# Patient Record
Sex: FEMALE | Race: WHITE | NOT HISPANIC OR LATINO | Employment: OTHER | URBAN - METROPOLITAN AREA
[De-identification: names, ages, dates, MRNs, and addresses within clinical notes are randomized per-mention and may not be internally consistent; named-entity substitution may affect disease eponyms.]

---

## 2017-05-15 ENCOUNTER — OFFICE VISIT (OUTPATIENT)
Dept: HEMATOLOGY/ONCOLOGY | Facility: CLINIC | Age: 52
End: 2017-05-15
Payer: MEDICARE

## 2017-05-15 VITALS
RESPIRATION RATE: 18 BRPM | HEART RATE: 66 BPM | WEIGHT: 127.69 LBS | TEMPERATURE: 98 F | DIASTOLIC BLOOD PRESSURE: 79 MMHG | SYSTOLIC BLOOD PRESSURE: 129 MMHG

## 2017-05-15 DIAGNOSIS — J43.1 PANLOBULAR EMPHYSEMA: ICD-10-CM

## 2017-05-15 DIAGNOSIS — Z87.891 PERSONAL HISTORY OF TOBACCO USE, PRESENTING HAZARDS TO HEALTH: ICD-10-CM

## 2017-05-15 DIAGNOSIS — C50.211 MALIGNANT NEOPLASM OF UPPER-INNER QUADRANT OF RIGHT FEMALE BREAST: ICD-10-CM

## 2017-05-15 DIAGNOSIS — Z17.1 ESTROGEN RECEPTOR NEGATIVE STATUS (ER-): ICD-10-CM

## 2017-05-15 PROBLEM — C50.911 MALIGNANT NEOPLASM OF RIGHT BREAST: Status: ACTIVE | Noted: 2017-05-15

## 2017-05-15 PROCEDURE — 99213 OFFICE O/P EST LOW 20 MIN: CPT | Mod: ,,, | Performed by: INTERNAL MEDICINE

## 2017-05-15 RX ORDER — GABAPENTIN 300 MG/1
300 CAPSULE ORAL 3 TIMES DAILY
COMMUNITY
End: 2018-05-07 | Stop reason: SDUPTHER

## 2017-05-15 RX ORDER — FENTANYL 25 UG/1
PATCH TRANSDERMAL
Refills: 0 | COMMUNITY
Start: 2017-04-17 | End: 2018-10-02

## 2017-05-15 RX ORDER — ZOLPIDEM TARTRATE 10 MG/1
5 TABLET ORAL NIGHTLY PRN
COMMUNITY
End: 2018-10-15 | Stop reason: SDUPTHER

## 2017-05-15 RX ORDER — ASPIRIN 325 MG
325 TABLET ORAL DAILY
COMMUNITY

## 2017-05-15 RX ORDER — HYDROCODONE BITARTRATE AND ACETAMINOPHEN 10; 325 MG/1; MG/1
1 TABLET ORAL EVERY 4 HOURS PRN
COMMUNITY
End: 2018-05-07 | Stop reason: SDUPTHER

## 2017-05-15 RX ORDER — ISOSORBIDE MONONITRATE 60 MG/1
TABLET, EXTENDED RELEASE ORAL
Refills: 11 | COMMUNITY
Start: 2017-04-17 | End: 2019-03-11 | Stop reason: SDUPTHER

## 2017-05-15 RX ORDER — EZETIMIBE 10 MG/1
10 TABLET ORAL DAILY
COMMUNITY
End: 2019-03-11 | Stop reason: SDUPTHER

## 2017-05-15 RX ORDER — ENALAPRIL MALEATE 5 MG/1
5 TABLET ORAL DAILY
COMMUNITY
End: 2019-03-11 | Stop reason: SDUPTHER

## 2017-05-15 NOTE — MR AVS SNAPSHOT
[facility] Hematology Oncology  [address]  [address]  [place] [address]  Phone: [phone]  Fax: [phone]                  Clara Murillo   5/15/2017 2:45 PM   Office Visit    Description:  Female : 1965   Provider:  Israel Neely MD   Department:  [facility] Oncology           Reason for Visit     Cancer     Melanoma     Skin Cancer           Diagnoses this Visit        Comments    Malignant neoplasm of upper-inner quadrant of right female breast         Estrogen receptor negative status (ER-)         Panlobular emphysema         Personal history of tobacco use, presenting hazards to health                To Do List           Future Appointments        Provider Department Dept Phone    2017 2:00 PM Fernando Ashraf MD [facility] Rheumatology [phone]    2017 2:45 PM Israel Neely MD [facility] Hematology Oncology [phone]      Goals (5 Years of Data)     None      Follow-Up and Disposition     Return in about 6 months (around 11/15/2017).    Follow-up and Disposition History           Medications                Verify that the below list of medications is an accurate representation of the medications you are currently taking.  If none reported, the list may be blank. If incorrect, please contact your healthcare provider. Carry this list with you in case of emergency.           Current Medications     ascorbic acid, vitamin C, (VITAMIN C) 100 MG tablet Take 100 mg by mouth once daily.    aspirin 325 MG tablet Take 325 mg by mouth once daily.    enalapril (VASOTEC) 5 MG tablet Take 5 mg by mouth once daily.    ezetimibe (ZETIA) 10 mg tablet Take 10 mg by mouth once daily.    fentaNYL (DURAGESIC) 25 mcg/hr APPLY ONE PATCH TOPICALLY EVERY 3 DAYS THANK YOU    gabapentin (NEURONTIN) 300 MG capsule Take 300 mg by mouth 3 (three) times daily.    hydrocodone-acetaminophen 10-325mg (NORCO)  mg Tab Take 1 tablet  by mouth every 4 (four) hours as needed for Pain.    isosorbide mononitrate (IMDUR) 60 MG 24 hr tablet TAKE ONE TABLET BY MOUTH EVERY DAY THANK YOU    omega-3 fatty acids (FISH OIL) 300 mg Cap Take by mouth.    zolpidem (AMBIEN) 10 mg Tab Take 5 mg by mouth nightly as needed.           Clinical Reference Information           Your Vitals Were     BP Pulse Temp Resp Weight       129/79 66 97.9 °F (36.6 °C) 18 57.9 kg (127 lb 11.2 oz)       Blood Pressure          Most Recent Value    BP  129/79      Allergies as of 5/15/2017     Pcn [Penicillins]      Immunizations Administered on Date of Encounter - 5/15/2017     None      Orders Placed During Today's Visit      Normal Orders This Visit    X-Ray Chest PA And Lateral     Recurring Lab Work Interval Expires    CBC auto differential  Once a week until 7/14/2018 7/14/2018    Comprehensive metabolic panel   7/14/2018

## 2017-05-15 NOTE — LETTER
May 15, 2017      Elina Acosta MD  [address]  [place] [address]           [facility] Hematology Oncology  [address]  [address]  [place] [address]  Phone: [phone]  Fax: [phone]          Patient: Clara Murillo   MR Number: [id number]   YOB: 1965   Date of Visit: 5/15/2017       Dear Dr. Elina Acosta:    Thank you for referring Clara Murillo to me for evaluation. Attached you will find relevant portions of my assessment and plan of care.    If you have questions, please do not hesitate to call me. I look forward to following Clara Murillo along with you.    Sincerely,    Israel Neely MD    Enclosure  CC:  No Recipients    If you would like to receive this communication electronically, please contact [email or web address] or [phone] to request more information on [employer] Link access.    For providers and/or their staff who would like to refer a patient to [facility], please contact us through our one-stop-shop provider referral line, [facility], at [phone].    If you feel you have received this communication in error or would no longer like to receive these types of communications, please e-mail [email or web address]

## 2017-05-15 NOTE — PROGRESS NOTES
[facility] Hematology/Oncology            PROGRESS NOTE      Subjective:       Patient ID:   NAME: Clara Murillo : 1965     52 y.o. female    Referring Doc: Elina Acosta MD  Other Physicians: Juan Diego Whitlock (PCP-[place])    Chief Complaint:  Cancer; Melanoma; and Skin Cancer      History of Present Illness:     Patient returns today for a regularly scheduled follow-up visit.  The patient was last seen in May 2016; she is doing ok with new issues; she denies any CP, SOB, HA's or N/V; she reports having new pink mole on left anterior auricular area, and dry patch on right lateral to nares on cheek            ROS:   GEN: normal without any fever, night sweats or weight loss  HEENT: normal with no HA's, sore throat, stiff neck, changes in vision  CV: normal with no CP, SOB, PND, LYNCH or orthopnea  PULM: normal with no SOB, cough, hemoptysis, sputum or pleuritic pain  GI: normal with no abdominal pain, nausea, vomiting, constipation, diarrhea, melanotic stools, BRBPR, or hematemesis  : normal with no hematuria, dysuria  BREAST: normal with no mass, discharge, pain  SKIN: new pink mole on left anterior auricular area, and dry patch on right lateral to nares on cheeck      Allergies:  Review of patient's allergies indicates:   Allergen Reactions    Pcn [penicillins] Hives       Medications:    Current Outpatient Prescriptions:     ascorbic acid, vitamin C, (VITAMIN C) 100 MG tablet, Take 100 mg by mouth once daily., Disp: , Rfl:     aspirin 325 MG tablet, Take 325 mg by mouth once daily., Disp: , Rfl:     enalapril (VASOTEC) 5 MG tablet, Take 5 mg by mouth once daily., Disp: , Rfl:     ezetimibe (ZETIA) 10 mg tablet, Take 10 mg by mouth once daily., Disp: , Rfl:     fentaNYL (DURAGESIC) 25 mcg/hr, APPLY ONE PATCH TOPICALLY EVERY 3 DAYS THANK YOU, Disp: , Rfl: 0    gabapentin (NEURONTIN) 300 MG capsule, Take 300 mg by mouth 3 (three) times daily., Disp: , Rfl:     hydrocodone-acetaminophen  10-325mg (NORCO)  mg Tab, Take 1 tablet by mouth every 4 (four) hours as needed for Pain., Disp: , Rfl:     isosorbide mononitrate (IMDUR) 60 MG 24 hr tablet, TAKE ONE TABLET BY MOUTH EVERY DAY THANK YOU, Disp: , Rfl: 11    omega-3 fatty acids (FISH OIL) 300 mg Cap, Take by mouth., Disp: , Rfl:     zolpidem (AMBIEN) 10 mg Tab, Take 5 mg by mouth nightly as needed., Disp: , Rfl:     PMHx/PSHx Updates:  See patient's last visit with me on Visit date not found.        Pathology:  No matching staging information was found for the patient.          Objective:     Vitals:  Blood pressure 129/79, pulse 66, temperature 97.9 °F (36.6 °C), resp. rate 18, weight 57.9 kg (127 lb 11.2 oz).    Physical Examination:   GEN: no apparent distress, comfortable; AAOx3  HEAD: atraumatic and normocephalic  EYES: no pallor, no icterus, PERRLA  ENT: OMM, no pharyngeal erythema, external ears WNL; no nasal discharge; no thrush  NECK: no masses, thyroid normal, trachea midline, no LAD/LN's, supple  CV: RRR with no murmur; normal pulse; normal S1 and S2; no pedal edema  CHEST: Normal respiratory effort; CTAB; normal breath sounds; no wheeze or crackles  ABDOM: nontender and nondistended; soft; normal bowel sounds; no rebound/guarding  MUSC/Skeletal: ROM normal; no crepitus; joints normal; no deformities or arthropathy  EXTREM: no clubbing, cyanosis, inflammation or swelling  SKIN: new pink mole on left anterior auricular area, and dry patch on right lateral to nares on cheeck  : no keen  NEURO: grossly intact; motor/sensory WNL; AAOx3; no tremors  PSYCH: normal mood, affect and behavior  LYMPH: normal cervical, supraclavicular, axillary and groin LN's            Labs:   none    I have reviewed all available lab results and radiology reports.    Radiology/Diagnostic Studies:        Assessment/Plan:   (1) 52 y.o. female with diagnosis of right breast cancer  - diagnosed in 2004  - Stage II  - s/p bilateral mastectomies  - followed  by Dr Acosta with Gen Surgery  - s/p AC x4 and XRT  - triple negative  - CT scans in 3/2016    (2) hx/of TIA's and PAD  - followed by Dr Whitlock with cardiology    (3) COPD and former smoker  - followed by Dr Wolff with pulm    (4) Right inferior auricular biopsy in Nov 2013 - atypical single unit junctional melanocytic hyperplasia with no evidence of malignancy  - Dr Acosta following  - new dry spots on right cheek and left anterior auricuar    (5) arthritic changes     PLAN:  1. Check up to date labs  2. Refer to Dermatology  3. Check CXR  4. F/u with Dr Acosta  Fax note to Juan Diego Lee and Kamlesh  RTC 6 months            Discussion:     I have explained all of the above in detail and the patient understands all of the current recommendation(s). I have answered all of their questions to the best of my ability and to their complete satisfaction.   The patient is to continue with the current management plan.    RTC in  6 months keep; 1 month can cancel          Electronically signed by Israel Neely MD

## 2017-05-24 ENCOUNTER — TELEPHONE (OUTPATIENT)
Dept: HEMATOLOGY/ONCOLOGY | Facility: CLINIC | Age: 52
End: 2017-05-24

## 2017-05-24 NOTE — TELEPHONE ENCOUNTER
Pt called in requesting deborah and dr reyes to send a referral to dr. Denise ellis dermatologist     Fax # [phone]

## 2017-11-20 ENCOUNTER — OFFICE VISIT (OUTPATIENT)
Dept: HEMATOLOGY/ONCOLOGY | Facility: CLINIC | Age: 52
End: 2017-11-20
Payer: MEDICARE

## 2017-11-20 VITALS
TEMPERATURE: 98 F | HEIGHT: 64 IN | RESPIRATION RATE: 18 BRPM | HEART RATE: 65 BPM | DIASTOLIC BLOOD PRESSURE: 84 MMHG | SYSTOLIC BLOOD PRESSURE: 130 MMHG | BODY MASS INDEX: 21.92 KG/M2 | WEIGHT: 128.38 LBS

## 2017-11-20 DIAGNOSIS — Z17.1 ESTROGEN RECEPTOR NEGATIVE TUMOR STATUS: ICD-10-CM

## 2017-11-20 DIAGNOSIS — Z87.891 PERSONAL HISTORY OF TOBACCO USE, PRESENTING HAZARDS TO HEALTH: ICD-10-CM

## 2017-11-20 DIAGNOSIS — Z17.1 MALIGNANT NEOPLASM OF UPPER-INNER QUADRANT OF RIGHT BREAST IN FEMALE, ESTROGEN RECEPTOR NEGATIVE: Primary | ICD-10-CM

## 2017-11-20 DIAGNOSIS — C50.211 MALIGNANT NEOPLASM OF UPPER-INNER QUADRANT OF RIGHT BREAST IN FEMALE, ESTROGEN RECEPTOR NEGATIVE: Primary | ICD-10-CM

## 2017-11-20 PROCEDURE — 99213 OFFICE O/P EST LOW 20 MIN: CPT | Mod: ,,, | Performed by: INTERNAL MEDICINE

## 2017-11-20 NOTE — LETTER
November 20, 2017      Chai Adamson MD  [address] [place] [address]           [facility] Hematology Oncology  [facility]  [address]  [facility] [address]  Phone: [phone]  Fax: [phone]          Patient: Clara Dotson   MR Number: [id number]   YOB: 1965   Date of Visit: 11/20/2017       Dear Dr. Chai Adamson:    Thank you for referring Clara Dotson to me for evaluation. Attached you will find relevant portions of my assessment and plan of care.    If you have questions, please do not hesitate to call me. I look forward to following Clara Dotson along with you.    Sincerely,    Israel Neely MD    Enclosure  CC:  No Recipients    If you would like to receive this communication electronically, please contact [email or web address] or [phone] to request more information on [employer] Link access.    For providers and/or their staff who would like to refer a patient to [facility], please contact us through our one-stop-shop provider referral line, [facility], at [phone].    If you feel you have received this communication in error or would no longer like to receive these types of communications, please e-mail [email or web address]

## 2017-11-20 NOTE — PROGRESS NOTES
[facility] Hematology/Oncology  PROGRESS NOTE      Subjective:       Patient ID:   NAME: Clara Dotson : 1965     52 y.o. female    Referring Doc: Dave  Other Physicians: Juan Diego Whitlock (PCP-[place])    Chief Complaint:  breast ca  f/u    History of Present Illness:     Patient returns today for a regularly scheduled follow-up visit.  The patient is her with a family friend. She is doing ok. She is seeing a heart specialist in [place] (Dr Gaytan). She denies any current CP, SOB,HA's or N/V. Occasional leg cramps.             ROS:   GEN: normal without any fever, night sweats or weight loss  HEENT: normal with no HA's, sore throat, stiff neck, changes in vision  CV: normal with no CP, SOB, PND, LYNCH or orthopnea  PULM: normal with no SOB, cough, hemoptysis, sputum or pleuritic pain  GI: normal with no abdominal pain, nausea, vomiting, constipation, diarrhea, melanotic stools, BRBPR, or hematemesis  : normal with no hematuria, dysuria  BREAST: normal with no mass, discharge, pain  SKIN: normal with no rash, erythema, bruising, or swelling    Allergies:  Review of patient's allergies indicates:   Allergen Reactions    Pcn [penicillins] Hives       Medications:    Current Outpatient Prescriptions:     ascorbic acid, vitamin C, (VITAMIN C) 100 MG tablet, Take 100 mg by mouth once daily., Disp: , Rfl:     aspirin 325 MG tablet, Take 325 mg by mouth once daily., Disp: , Rfl:     enalapril (VASOTEC) 5 MG tablet, Take 5 mg by mouth once daily., Disp: , Rfl:     ezetimibe (ZETIA) 10 mg tablet, Take 10 mg by mouth once daily., Disp: , Rfl:     fentaNYL (DURAGESIC) 25 mcg/hr, APPLY ONE PATCH TOPICALLY EVERY 3 DAYS THANK YOU, Disp: , Rfl: 0    gabapentin (NEURONTIN) 300 MG capsule, Take 300 mg by mouth 3 (three) times daily., Disp: , Rfl:     hydrocodone-acetaminophen 10-325mg (NORCO)  mg Tab, Take 1 tablet by mouth every 4 (four) hours as needed for Pain., Disp: , Rfl:     isosorbide mononitrate  "(IMDUR) 60 MG 24 hr tablet, TAKE ONE TABLET BY MOUTH EVERY DAY THANK YOU, Disp: , Rfl: 11    zolpidem (AMBIEN) 10 mg Tab, Take 5 mg by mouth nightly as needed., Disp: , Rfl:     PMHx/PSHx Updates:  See patient's last visit with me on 5/15/2017.  See H&P on         Pathology:  See Vol #1          Objective:     Vitals:  Blood pressure 130/84, pulse 65, temperature 98.4 °F (36.9 °C), resp. rate 18, height 5' 4" (1.626 m), weight 58.2 kg (128 lb 6.4 oz).    Physical Examination:   GEN: no apparent distress, comfortable; AAOx3  HEAD: atraumatic and normocephalic  EYES: no pallor, no icterus, PERRLA  ENT: OMM, no pharyngeal erythema, external ears WNL; no nasal discharge; no thrush  NECK: no masses, thyroid normal, trachea midline, no LAD/LN's, supple  CV: RRR with no murmur; normal pulse; normal S1 and S2; no pedal edema  CHEST: Normal respiratory effort; CTAB; normal breath sounds; no wheeze or crackles  ABDOM: nontender and nondistended; soft; normal bowel sounds; no rebound/guarding  MUSC/Skeletal: ROM normal; no crepitus; joints normal; no deformities or arthropathy  EXTREM: no clubbing, cyanosis, inflammation or swelling  SKIN: no rashes, lesions, ulcers, petechiae or subcutaneous nodules  : no keen  NEURO: grossly intact; motor/sensory WNL; AAOx3; no tremors  PSYCH: normal mood, affect and behavior  LYMPH: normal cervical, supraclavicular, axillary and groin LN's  Breast: no changes; bilateral mastectomies          Labs:   pending    5/31/2017 on chart    Radiology/Diagnostic Studies:    CXR 5/31/2017 negative    I have reviewed all available lab results and radiology reports.    Assessment/Plan:     (1) 52 y.o. female with diagnosis of right breast cancer  - diagnosed in 2004  - Stage II  - s/p bilateral mastectomies  - followed by Dr Acosta with Gen Surgery  - s/p AC x4 and XRT  - triple negative  - CT scans in 3/2016     (2) hx/of TIA's and PAD  - followed by Dr Whitlock with cardiology     (3) COPD and " former smoker  - followed by Dr Wolff with pulm     (4) Right inferior auricular biopsy in Nov 2013 - atypical single unit junctional melanocytic hyperplasia with no evidence of malignancy  - Dr Acosta following  - new dry spots on right cheek and left anterior auricuar     (5) arthritic changes     (6) Skin cancer issues - followed by Dr Manley (Derm)       PLAN:  1. F/u with PCP, card, dermatology and [facility]  2. Check up to date labs  3. RTC in 6 months  Fax note to Chai Adamson MD, Lucila Acosta [place]    Discussion:     I have explained all of the above in detail and the patient understands all of the current recommendation(s). I have answered all of their questions to the best of my ability and to their complete satisfaction.   The patient is to continue with the current management plan.            Electronically signed by Israel Neely MD

## 2018-04-20 RX ORDER — ZOLPIDEM TARTRATE 10 MG/1
TABLET ORAL
Qty: 30 TABLET | OUTPATIENT
Start: 2018-04-20

## 2018-04-20 RX ORDER — GABAPENTIN 300 MG/1
CAPSULE ORAL
Qty: 90 CAPSULE | OUTPATIENT
Start: 2018-04-20

## 2018-04-25 ENCOUNTER — TELEPHONE (OUTPATIENT)
Dept: HEMATOLOGY/ONCOLOGY | Facility: CLINIC | Age: 53
End: 2018-04-25

## 2018-05-07 ENCOUNTER — LAB VISIT (OUTPATIENT)
Dept: LAB | Facility: HOSPITAL | Age: 53
End: 2018-05-07
Attending: INTERNAL MEDICINE
Payer: MEDICARE

## 2018-05-07 ENCOUNTER — TELEPHONE (OUTPATIENT)
Dept: HEMATOLOGY/ONCOLOGY | Facility: CLINIC | Age: 53
End: 2018-05-07

## 2018-05-07 ENCOUNTER — OFFICE VISIT (OUTPATIENT)
Dept: HEMATOLOGY/ONCOLOGY | Facility: CLINIC | Age: 53
End: 2018-05-07
Payer: MEDICARE

## 2018-05-07 VITALS
TEMPERATURE: 98 F | WEIGHT: 133.81 LBS | SYSTOLIC BLOOD PRESSURE: 97 MMHG | BODY MASS INDEX: 22.97 KG/M2 | RESPIRATION RATE: 18 BRPM | HEART RATE: 79 BPM | DIASTOLIC BLOOD PRESSURE: 63 MMHG | OXYGEN SATURATION: 97 %

## 2018-05-07 DIAGNOSIS — J43.1 PANLOBULAR EMPHYSEMA: ICD-10-CM

## 2018-05-07 DIAGNOSIS — G62.9 NEUROPATHY: ICD-10-CM

## 2018-05-07 DIAGNOSIS — D53.9 NUTRITIONAL ANEMIA: ICD-10-CM

## 2018-05-07 DIAGNOSIS — C50.211 MALIGNANT NEOPLASM OF UPPER-INNER QUADRANT OF RIGHT BREAST IN FEMALE, ESTROGEN RECEPTOR NEGATIVE: Primary | ICD-10-CM

## 2018-05-07 DIAGNOSIS — Z87.891 PERSONAL HISTORY OF TOBACCO USE, PRESENTING HAZARDS TO HEALTH: ICD-10-CM

## 2018-05-07 DIAGNOSIS — Z17.1 ESTROGEN RECEPTOR NEGATIVE TUMOR STATUS: ICD-10-CM

## 2018-05-07 DIAGNOSIS — Z17.1 MALIGNANT NEOPLASM OF UPPER-INNER QUADRANT OF RIGHT BREAST IN FEMALE, ESTROGEN RECEPTOR NEGATIVE: Primary | ICD-10-CM

## 2018-05-07 DIAGNOSIS — G89.4 CHRONIC PAIN DISORDER: ICD-10-CM

## 2018-05-07 LAB
ALBUMIN SERPL BCP-MCNC: 3.9 G/DL
ALP SERPL-CCNC: 82 U/L
ALT SERPL W/O P-5'-P-CCNC: 43 U/L
ANION GAP SERPL CALC-SCNC: 8 MMOL/L
AST SERPL-CCNC: 35 U/L
BASOPHILS # BLD AUTO: 0.05 K/UL
BASOPHILS NFR BLD: 1.2 %
BILIRUB SERPL-MCNC: 0.5 MG/DL
BUN SERPL-MCNC: 15 MG/DL
CALCIUM SERPL-MCNC: 9.1 MG/DL
CHLORIDE SERPL-SCNC: 106 MMOL/L
CO2 SERPL-SCNC: 25 MMOL/L
CREAT SERPL-MCNC: 1 MG/DL
DIFFERENTIAL METHOD: ABNORMAL
EOSINOPHIL # BLD AUTO: 0.1 K/UL
EOSINOPHIL NFR BLD: 2.4 %
ERYTHROCYTE [DISTWIDTH] IN BLOOD BY AUTOMATED COUNT: 12.7 %
EST. GFR  (AFRICAN AMERICAN): >60 ML/MIN/1.73 M^2
EST. GFR  (NON AFRICAN AMERICAN): >60 ML/MIN/1.73 M^2
FOLATE SERPL-MCNC: 12.1 NG/ML
GLUCOSE SERPL-MCNC: 128 MG/DL
HCT VFR BLD AUTO: 33.3 %
HGB BLD-MCNC: 11.6 G/DL
IMM GRANULOCYTES # BLD AUTO: 0.01 K/UL
IMM GRANULOCYTES NFR BLD AUTO: 0.2 %
LYMPHOCYTES # BLD AUTO: 1.9 K/UL
LYMPHOCYTES NFR BLD: 43.7 %
MCH RBC QN AUTO: 30.1 PG
MCHC RBC AUTO-ENTMCNC: 34.8 G/DL
MCV RBC AUTO: 87 FL
MONOCYTES # BLD AUTO: 0.3 K/UL
MONOCYTES NFR BLD: 8 %
NEUTROPHILS # BLD AUTO: 1.9 K/UL
NEUTROPHILS NFR BLD: 44.5 %
NRBC BLD-RTO: 0 /100 WBC
PLATELET # BLD AUTO: 236 K/UL
PMV BLD AUTO: 9.9 FL
POTASSIUM SERPL-SCNC: 3.5 MMOL/L
PROT SERPL-MCNC: 6.7 G/DL
RBC # BLD AUTO: 3.85 M/UL
SODIUM SERPL-SCNC: 139 MMOL/L
VIT B12 SERPL-MCNC: 195 PG/ML
WBC # BLD AUTO: 4.23 K/UL

## 2018-05-07 PROCEDURE — 85025 COMPLETE CBC W/AUTO DIFF WBC: CPT

## 2018-05-07 PROCEDURE — 36415 COLL VENOUS BLD VENIPUNCTURE: CPT

## 2018-05-07 PROCEDURE — 99214 OFFICE O/P EST MOD 30 MIN: CPT | Mod: ,,, | Performed by: INTERNAL MEDICINE

## 2018-05-07 PROCEDURE — 80053 COMPREHEN METABOLIC PANEL: CPT

## 2018-05-07 PROCEDURE — 82746 ASSAY OF FOLIC ACID SERUM: CPT

## 2018-05-07 PROCEDURE — 82607 VITAMIN B-12: CPT

## 2018-05-07 RX ORDER — GABAPENTIN 300 MG/1
CAPSULE ORAL
COMMUNITY
End: 2018-10-15 | Stop reason: SDUPTHER

## 2018-05-07 RX ORDER — HYDROCODONE BITARTRATE AND ACETAMINOPHEN 10; 325 MG/1; MG/1
TABLET ORAL EVERY 6 HOURS PRN
COMMUNITY

## 2018-05-07 NOTE — LETTER
May 7, 2018      Chai Adamson MD  [address] [place] [address]           [facility] Hematology Oncology  [facility] [address] [facility]  [address]  [facility] [address]  Phone: [phone]  Fax: [phone]          Patient: Clara Dotson   MR Number: [id number]   YOB: 1965   Date of Visit: 5/7/2018       Dear Dr. Chai Adamson:    Thank you for referring Clara Dotson to me for evaluation. Attached you will find relevant portions of my assessment and plan of care.    If you have questions, please do not hesitate to call me. I look forward to following Clara Dotson along with you.    Sincerely,    Israel Neely MD    Enclosure  CC:  No Recipients    If you would like to receive this communication electronically, please contact [email or web address] or [phone] to request more information on [employer] Link access.    For providers and/or their staff who would like to refer a patient to [facility], please contact us through our one-stop-shop provider referral line, [facility], at [phone].    If you feel you have received this communication in error or would no longer like to receive these types of communications, please e-mail [email or web address]

## 2018-05-07 NOTE — PROGRESS NOTES
[facility] Hematology/Oncology  PROGRESS NOTE      Subjective:       Patient ID:   NAME: Clara Dotson : 1965     53 y.o. female    Referring Doc: Dave  Other Physicians: Lucila (new [facility]), Juan Diego (PCP-[place]); Kalina (Neuro)    Chief Complaint:  breast ca  f/u    History of Present Illness:     Patient returns today for a regularly scheduled follow-up visit.  The patient is her by herself. She is doing ok. She is seeing a heart specialist in [place] (Dr Gaytan). She denies any current CP, SOB,HA's or N/V. She fell about 6 months ago and has been having problems with her knees and legs. She is seeing [facility] Gilman [facility]. She has SOB and LYNCH. She has pain in right knee cap. She saw neuro earlier today and they are planning nerve conduction studies. She plans to see pulmonologist in near future shahid johnson. She had some scan of the back which showed a lot of arthritis. No weight loss; good appetite          ROS:   GEN: normal without any fever, night sweats or weight loss  HEENT: normal with no HA's, sore throat, stiff neck, changes in vision  CV: normal with no CP, SOB, PND, LYNCH or orthopnea  PULM: normal with no SOB, cough, hemoptysis, sputum or pleuritic pain  GI: normal with no abdominal pain, nausea, vomiting, constipation, diarrhea, melanotic stools, BRBPR, or hematemesis  : normal with no hematuria, dysuria  BREAST: normal with no mass, discharge, pain  SKIN: normal with no rash, erythema, bruising, or swelling    Allergies:  Review of patient's allergies indicates:   Allergen Reactions    Pcn [penicillins] Hives       Medications:    Current Outpatient Prescriptions:     ascorbic acid, vitamin C, (VITAMIN C) 100 MG tablet, Take 100 mg by mouth once daily., Disp: , Rfl:     aspirin 325 MG tablet, Take 325 mg by mouth once daily., Disp: , Rfl:     enalapril (VASOTEC) 5 MG tablet, Take 5 mg by mouth once daily., Disp: , Rfl:     ezetimibe (ZETIA) 10 mg tablet, Take 10 mg by mouth  once daily., Disp: , Rfl:     fentaNYL (DURAGESIC) 25 mcg/hr, APPLY ONE PATCH TOPICALLY EVERY 3 DAYS THANK YOU, Disp: , Rfl: 0    gabapentin (NEURONTIN) 300 MG capsule, gabapentin 300 mg capsule, Disp: , Rfl:     hydrocodone-acetaminophen 10-325mg (NORCO)  mg Tab, hydrocodone 10 mg-acetaminophen 325 mg tablet, Disp: , Rfl:     isosorbide mononitrate (IMDUR) 60 MG 24 hr tablet, TAKE ONE TABLET BY MOUTH EVERY DAY THANK YOU, Disp: , Rfl: 11    zolpidem (AMBIEN) 10 mg Tab, Take 5 mg by mouth nightly as needed., Disp: , Rfl:     PMHx/PSHx Updates:  See patient's last visit with me on 11/20/2017.  See H&P on         Pathology:  See Vol #1          Objective:     Vitals:  Blood pressure 97/63, pulse 79, temperature 98.1 °F (36.7 °C), resp. rate 18, weight 60.7 kg (133 lb 12.8 oz), SpO2 97 %.    Physical Examination:   GEN: no apparent distress, comfortable; AAOx3  HEAD: atraumatic and normocephalic  EYES: no pallor, no icterus, PERRLA  ENT: OMM, no pharyngeal erythema, external ears WNL; no nasal discharge; no thrush  NECK: no masses, thyroid normal, trachea midline, no LAD/LN's, supple  CV: RRR with no murmur; normal pulse; normal S1 and S2; no pedal edema  CHEST: Normal respiratory effort; CTAB; normal breath sounds; no wheeze or crackles  ABDOM: nontender and nondistended; soft; normal bowel sounds; no rebound/guarding  MUSC/Skeletal: ROM normal; no crepitus; joints normal; no deformities or arthropathy  EXTREM: no clubbing, cyanosis, inflammation or swelling  SKIN: no rashes, lesions, ulcers, petechiae or subcutaneous nodules; tatoos  : no keen  NEURO: grossly intact; motor/sensory WNL; AAOx3; no tremors  PSYCH: normal mood, affect and behavior  LYMPH: normal cervical, supraclavicular, axillary and groin LN's  Breast: no changes; bilateral mastectomies          Labs:   None available    Radiology/Diagnostic Studies:    CXR 5/31/2017 negative    I have reviewed all available lab results and radiology  reports.    Assessment/Plan:     (1) 53 y.o. female with diagnosis of right breast cancer  - diagnosed in 2004  - Stage II  - s/p bilateral mastectomies  - followed by Dr Acosta with Gen Surgery  - s/p AC x4 and XRT  - triple negative  - CT scans in 3/2016     (2) hx/of TIA's and PAD  - followed by Dr Whitlock with cardiology and now by Dr Gaytan     (3) COPD and former smoker  - followed by Dr Wolff with pulm in past     (4) Right inferior auricular biopsy in Nov 2013 - atypical single unit junctional melanocytic hyperplasia with no evidence of malignancy  - Dr Acosta following  - new dry spots on right cheek and left anterior auricular     (5) Arthritic issues    (6) Skin cancer issues - followed by Dr Manley (Derm)    (7) New lower extremity issues - she is seeing Dr Gilman/René with Neurology at [place] and plans to have nerve conduction study in the future    (8) Chronic pain syndrome - followed by Pain management - Dr Barajas       PLAN:  1. F/u with PCP, card, dermatology, neuro and Gen Surg  2. Check up to date labs  3. Schedule PET scan if possible  3. RTC in 6 weeks  Fax note to Chai Adamson MD, Lucila Acosta [place], [place]    Discussion:     I have explained all of the above in detail and the patient understands all of the current recommendation(s). I have answered all of their questions to the best of my ability and to their complete satisfaction.   The patient is to continue with the current management plan.            Electronically signed by Israel Neely MD

## 2018-05-08 ENCOUNTER — TELEPHONE (OUTPATIENT)
Dept: HEMATOLOGY/ONCOLOGY | Facility: CLINIC | Age: 53
End: 2018-05-08

## 2018-05-08 NOTE — TELEPHONE ENCOUNTER
----- Message from Israel Neely MD sent at 5/8/2018  8:51 AM CDT -----  She needs to start B12 1000mcg SQ weekly x 4 then monthly      Spoke to Clara about getting B-12 injections. She is in agreement and will call Tabitha back to schedule later today or tomorrow.

## 2018-05-24 ENCOUNTER — TELEPHONE (OUTPATIENT)
Dept: HEMATOLOGY/ONCOLOGY | Facility: CLINIC | Age: 53
End: 2018-05-24

## 2018-05-24 NOTE — TELEPHONE ENCOUNTER
----- Message from Annie Purcell sent at 5/23/2018  2:25 PM CDT -----  Patient called in requesting that Dr. Neely order a lung test. She said that Dr. Gay originally ordered it at [facility], however their machine is messed up and they cannot get her in until the end of July. Please advise and contact patient [phone]. Thanks    Nurses notes reviewed and accepted.     CC:  Patient is a 41 year old female who presents self to clinic seen at the recommendation of Dr. Robin Daley MD with complaint of thickened toenails.    HPI: Ms. Perez relates that she has been prescribed oral Lamisil in the past but never seemed to help.  She took oral Lamisil 3 separate times; June 2009, September 2009, June 2016 without improvement noted.  She had a fungal culture of the nail plate by Dr. More several years ago that was negative for fungus.   Left hallux nail was traumatized several years ago, nail fell off and when it grew back in, was thicker, discolored and only grew 1/2 way out to the nail bed.    Pertinent PMH:      Other genital herpes                                          Multiple sclerosis (CMS/[facility])                    5/16/2014     Chronic rhinitis                                3/6/2014      Lateral epicondylitis  of elbow                 3/6/2014      Sciatic pain                                    10/29/2012    Back pain                                       10/29/2012    Herpes genitalis                                2/3/2012      Dizziness                                       10/27/2011    Generalized anxiety disorder                    6/29/2010     ABN INVOLUN MOVEMENT NEC                        5/11/2010     Depressive disorder, not elsewhere classified   7/29/2009     Dermatophytosis of nail                         6/10/2009     Unspecified constipation                        9/26/2006     PERS HX OF TOBACCO USE                          7/1/2004      Disorder of eye, unspecified                    2/27/2003     Urticaria, unspecified                          2/27/2003     Pertinent PSH:      XRAY HYSTEROSALPINGOGRAM                        9/24/13         Comment: Normal fill and spill    Family and Social Hx:    Review of patient's family status indicates:    Mother                         Alive                     Father                          Alive                       Comment: Unknown    Other                                                    Paternal Aunt                                            Paternal Aunt                                             Social History   Substance Use Topics   • Smoking status: Former Smoker     Packs/day: 0.50     Years: 10.00     Types: Cigarettes     Quit date: 5/10/2009   • Smokeless tobacco: Never Used   • Alcohol use No      Comment: very rare     Current medications reviewed with patient, please see list on chart.  Patient is not on anticoagulant and diabetic medications.    Exam:  General:  Patient presents to the clinic alert, pleasant, cooperative, in no acute distress.    Vascular:  Peripheral pedal pulses palpable, graded at 2/4 for DP and 2/4 for PT to bilateral LE, mild edema noted to bilateral ankles, skin temperature is warm to warm, proximal ankle to distal digits bilateral, no open ulcers on either leg, no cyanosis noted  and no clubbing noted.   Hair growth is present on the dorsal foot bilaterally.      Dermatologic:  Skin texture to the feet is within normal limits to bilateral LE.  No ecchymosis or erythema to either foot or ankle.  Skin is clean, dry and intact to bilateral foot.      Fungal appearing thickened nails 1,2,3 left and 2 right foot.     Musculoskeletal:  Muscle strength is graded at 5/5 Bilateral LE, dorsiflexion, plantar flexion, inversion and eversion.      Impression:  Onychomycosis versus onychodystrophy    Plan:    I discussed with the patient the etiology and treatment options for onychomycosis including both topical and oral antifungal agents; side effect potentials, success rates and fees.   Specimen collected for fungal culture today of all toenails.  Will contact her with results when available.    Copy to Dr. Daley.  Thank you for the opportunity to participate in this pleasant patient's cares.

## 2018-05-25 ENCOUNTER — TELEPHONE (OUTPATIENT)
Dept: HEMATOLOGY/ONCOLOGY | Facility: CLINIC | Age: 53
End: 2018-05-25

## 2018-05-25 NOTE — TELEPHONE ENCOUNTER
----- Message from Marcella Carranza sent at 5/24/2018  2:00 PM CDT -----  Contact: Clara  Nurse please return call. Patient requesting Dr Neely to place order for a lung test. Please advise. [phone]

## 2018-05-26 ENCOUNTER — INFUSION (OUTPATIENT)
Dept: INFUSION THERAPY | Facility: HOSPITAL | Age: 53
End: 2018-05-26
Attending: INTERNAL MEDICINE
Payer: MEDICARE

## 2018-05-26 VITALS
SYSTOLIC BLOOD PRESSURE: 134 MMHG | RESPIRATION RATE: 18 BRPM | OXYGEN SATURATION: 98 % | HEART RATE: 61 BPM | TEMPERATURE: 97 F | DIASTOLIC BLOOD PRESSURE: 77 MMHG

## 2018-05-26 DIAGNOSIS — D51.9 ANEMIA DUE TO VITAMIN B12 DEFICIENCY, UNSPECIFIED B12 DEFICIENCY TYPE: ICD-10-CM

## 2018-05-26 DIAGNOSIS — E53.8 VITAMIN B12 DEFICIENCY: Primary | ICD-10-CM

## 2018-05-26 PROCEDURE — 96372 THER/PROPH/DIAG INJ SC/IM: CPT

## 2018-05-26 PROCEDURE — 63600175 PHARM REV CODE 636 W HCPCS: Performed by: INTERNAL MEDICINE

## 2018-05-26 RX ORDER — CYANOCOBALAMIN 1000 UG/ML
100 INJECTION, SOLUTION INTRAMUSCULAR; SUBCUTANEOUS
Status: ACTIVE | OUTPATIENT
Start: 2018-05-26

## 2018-05-26 RX ADMIN — CYANOCOBALAMIN 100 MCG: 1000 INJECTION, SOLUTION INTRAMUSCULAR at 01:05

## 2018-06-02 ENCOUNTER — INFUSION (OUTPATIENT)
Dept: INFUSION THERAPY | Facility: HOSPITAL | Age: 53
End: 2018-06-02
Payer: MEDICARE

## 2018-06-02 VITALS
OXYGEN SATURATION: 99 % | RESPIRATION RATE: 17 BRPM | TEMPERATURE: 98 F | HEART RATE: 74 BPM | DIASTOLIC BLOOD PRESSURE: 69 MMHG | SYSTOLIC BLOOD PRESSURE: 109 MMHG

## 2018-06-02 DIAGNOSIS — E53.8 VITAMIN B12 DEFICIENCY: Primary | ICD-10-CM

## 2018-06-02 PROCEDURE — 96372 THER/PROPH/DIAG INJ SC/IM: CPT

## 2018-06-02 PROCEDURE — 99211 OFF/OP EST MAY X REQ PHY/QHP: CPT | Mod: 25

## 2018-06-02 PROCEDURE — 63600175 PHARM REV CODE 636 W HCPCS: Performed by: INTERNAL MEDICINE

## 2018-06-02 RX ADMIN — CYANOCOBALAMIN 1000 MCG: 1000 INJECTION, SOLUTION INTRAMUSCULAR at 09:06

## 2018-06-02 NOTE — NURSING
PT PRESENTED TO THE OPT DEPT FOR B12 INJ/MEDICATIONS GIVEN AS ORDERED/PT YANNI WELL/PT LEFT THE OPT DEPT TO POV/HOME ALONE.

## 2018-06-05 ENCOUNTER — TELEPHONE (OUTPATIENT)
Dept: HEMATOLOGY/ONCOLOGY | Facility: CLINIC | Age: 53
End: 2018-06-05

## 2018-06-05 NOTE — TELEPHONE ENCOUNTER
Called patient let her know pet scan showed no signs of cancer but she does have a fatty liver  And deborah is setting her up with GI specialist

## 2018-06-08 DIAGNOSIS — D64.9 ANEMIA: Primary | ICD-10-CM

## 2018-08-09 ENCOUNTER — OFFICE VISIT (OUTPATIENT)
Dept: HEMATOLOGY/ONCOLOGY | Facility: CLINIC | Age: 53
End: 2018-08-09
Payer: MEDICARE

## 2018-08-09 VITALS
TEMPERATURE: 98 F | RESPIRATION RATE: 18 BRPM | DIASTOLIC BLOOD PRESSURE: 67 MMHG | BODY MASS INDEX: 22.23 KG/M2 | HEART RATE: 76 BPM | WEIGHT: 129.5 LBS | SYSTOLIC BLOOD PRESSURE: 101 MMHG

## 2018-08-09 DIAGNOSIS — C50.211 MALIGNANT NEOPLASM OF UPPER-INNER QUADRANT OF RIGHT BREAST IN FEMALE, ESTROGEN RECEPTOR NEGATIVE: Primary | ICD-10-CM

## 2018-08-09 DIAGNOSIS — K76.0 HEPATIC STEATOSIS: ICD-10-CM

## 2018-08-09 DIAGNOSIS — Z17.1 ESTROGEN RECEPTOR NEGATIVE TUMOR STATUS: ICD-10-CM

## 2018-08-09 DIAGNOSIS — R06.02 SOB (SHORTNESS OF BREATH): ICD-10-CM

## 2018-08-09 DIAGNOSIS — Z87.891 PERSONAL HISTORY OF TOBACCO USE, PRESENTING HAZARDS TO HEALTH: ICD-10-CM

## 2018-08-09 DIAGNOSIS — Z17.1 MALIGNANT NEOPLASM OF UPPER-INNER QUADRANT OF RIGHT BREAST IN FEMALE, ESTROGEN RECEPTOR NEGATIVE: Primary | ICD-10-CM

## 2018-08-09 PROCEDURE — 99213 OFFICE O/P EST LOW 20 MIN: CPT | Mod: ,,, | Performed by: INTERNAL MEDICINE

## 2018-08-09 RX ORDER — OXYCODONE AND ACETAMINOPHEN 10; 325 MG/1; MG/1
TABLET ORAL
Refills: 0 | COMMUNITY
Start: 2018-07-13 | End: 2018-10-02 | Stop reason: ALTCHOICE

## 2018-08-09 NOTE — PROGRESS NOTES
[facility] Hematology/Oncology  PROGRESS NOTE      Subjective:       Patient ID:   NAME: Clara Murillo : 1965     53 y.o. female    Referring Doc: Dave  Other Physicians: Lucila (new [facility]), Juan Diego (PCP-Jaquelin); Kalina (Neuro)    Chief Complaint:  breast ca  f/u    History of Present Illness:     Patient returns today for a regularly scheduled follow-up visit.  The patient is her by herself. She is doing ok. She is seeing a heart specialist in [place] (Dr Gaytan). She denies any current CP, SOB,HA's or N/V. She has chronic SOB and LYNCH. She had lung test done last week at [facility] but report is unavailable at this time. No weight loss; good appetite. She is here with friend.           ROS:   GEN: normal without any fever, night sweats or weight loss  HEENT: normal with no HA's, sore throat, stiff neck, changes in vision  CV: normal with no CP, SOB, PND, LYNCH or orthopnea  PULM: normal with no SOB, cough, hemoptysis, sputum or pleuritic pain  GI: normal with no abdominal pain, nausea, vomiting, constipation, diarrhea, melanotic stools, BRBPR, or hematemesis  : normal with no hematuria, dysuria  BREAST: normal with no mass, discharge, pain  SKIN: normal with no rash, erythema, bruising, or swelling    Allergies:  Review of patient's allergies indicates:   Allergen Reactions    Pcn [penicillins] Hives       Medications:    Current Outpatient Prescriptions:     ascorbic acid, vitamin C, (VITAMIN C) 100 MG tablet, Take 100 mg by mouth once daily., Disp: , Rfl:     aspirin 325 MG tablet, Take 325 mg by mouth once daily., Disp: , Rfl:     enalapril (VASOTEC) 5 MG tablet, Take 5 mg by mouth once daily., Disp: , Rfl:     ezetimibe (ZETIA) 10 mg tablet, Take 10 mg by mouth once daily., Disp: , Rfl:     gabapentin (NEURONTIN) 300 MG capsule, gabapentin 300 mg capsule, Disp: , Rfl:     isosorbide mononitrate (IMDUR) 60 MG 24 hr tablet, TAKE ONE TABLET BY MOUTH EVERY DAY THANK YOU, Disp: , Rfl: 11     zolpidem (AMBIEN) 10 mg Tab, Take 5 mg by mouth nightly as needed., Disp: , Rfl:     fentaNYL (DURAGESIC) 25 mcg/hr, APPLY ONE PATCH TOPICALLY EVERY 3 DAYS THANK YOU, Disp: , Rfl: 0    hydrocodone-acetaminophen 10-325mg (NORCO)  mg Tab, hydrocodone 10 mg-acetaminophen 325 mg tablet, Disp: , Rfl:     oxyCODONE-acetaminophen (PERCOCET)  mg per tablet, TAKE ONE TABLET BY MOUTH FOUR TIMES DAILY THANK YOU, Disp: , Rfl: 0    Current Facility-Administered Medications:     cyanocobalamin injection 100 mcg, 100 mcg, Intramuscular, Q30 Days, Israel Neely MD, 1,000 mcg at 06/02/18 0945    PMHx/PSHx Updates:  See patient's last visit with me on 5/7/2018.  See H&P in Vol #1        Pathology:  See Vol #1          Objective:     Vitals:  Blood pressure 101/67, pulse 76, temperature 98.4 °F (36.9 °C), resp. rate 18, weight 58.7 kg (129 lb 8 oz).    Physical Examination:   GEN: no apparent distress, comfortable; AAOx3  HEAD: atraumatic and normocephalic  EYES: no pallor, no icterus, PERRLA  ENT: OMM, no pharyngeal erythema, external ears WNL; no nasal discharge; no thrush  NECK: no masses, thyroid normal, trachea midline, no LAD/LN's, supple  CV: RRR with no murmur; normal pulse; normal S1 and S2; no pedal edema  CHEST: Normal respiratory effort; CTAB; normal breath sounds; no wheeze or crackles  ABDOM: nontender and nondistended; soft; normal bowel sounds; no rebound/guarding  MUSC/Skeletal: ROM normal; no crepitus; joints normal; no deformities or arthropathy  EXTREM: no clubbing, cyanosis, inflammation or swelling  SKIN: no rashes, lesions, ulcers, petechiae or subcutaneous nodules; tattoos  : no keen  NEURO: grossly intact; motor/sensory WNL; AAOx3; no tremors  PSYCH: normal mood, affect and behavior  LYMPH: normal cervical, supraclavicular, axillary and groin LN's  Breast: no changes; bilateral mastectomies          Labs:   5/7/2018  Lab Results   Component Value Date    WBC 4.23 05/07/2018    HGB 11.6  (L) 05/07/2018    HCT 33.3 (L) 05/07/2018    MCV 87 05/07/2018     05/07/2018     BMP  Lab Results   Component Value Date     05/07/2018    K 3.5 05/07/2018     05/07/2018    CO2 25 05/07/2018    BUN 15 05/07/2018    CREATININE 1.0 05/07/2018    CALCIUM 9.1 05/07/2018    ANIONGAP 8 05/07/2018    ESTGFRAFRICA >60.0 05/07/2018    EGFRNONAA >60.0 05/07/2018     Lab Results   Component Value Date    ALT 43 05/07/2018    AST 35 05/07/2018    ALKPHOS 82 05/07/2018    BILITOT 0.5 05/07/2018         Radiology/Diagnostic Studies:    PET/CT Fusion 6/4/2018:  IMPRESSION:    1. Negative for recurrent malignancy or metastatic disease.    2. Development of moderate hepatic steatosis.        CXR 5/31/2017 negative    I have reviewed all available lab results and radiology reports.    Assessment/Plan:     (1) 53 y.o. female with diagnosis of right breast cancer  - diagnosed in 2004  - Stage II  - s/p bilateral mastectomies  - followed by Dr Acosta with Gen Surgery  - s/p AC x4 and XRT  - triple negative  - CT scans in 3/2016  - PET on 6/4/2018 - negative for recurrent cancer     (2) hx/of TIA's and PAD  - followed by Dr Whitlock with cardiology and now by Dr Gaytan     (3) COPD and former smoker  - followed by Dr Wolff with pulm in past     (4) Right inferior auricular biopsy in Nov 2013 - atypical single unit junctional melanocytic hyperplasia with no evidence of malignancy  - Dr Acosta following  - new dry spots on right cheek and left anterior auricular     (5) Arthritic issues    (6) Skin cancer issues - followed by Dr Manley (Derm)    (7) Lower extremity issues - she is seeing Dr Gilman/René with Neurology at [facility] and plans to have nerve conduction study in the future    (8) Chronic pain syndrome - followed by Pain management - Dr Barajas    (9) hepatic steatosis - will refer to GI    (10) breathing issues - recent lung study done at [facility] per Dr Escoto; she plans to see pulmonologist in the near  future         1. Malignant neoplasm of upper-inner quadrant of right breast in female, estrogen receptor negative     2. Estrogen receptor negative tumor status     3. Personal history of tobacco use, presenting hazards to health             PLAN:  1. F/u with PCP, card, dermatology, neuro and Gen Surg  2. Check labs periodically  3. Refer to GI for hepatic steatosis and Pulm  3. RTC in 5 months  Fax note to Chai Adamson (PCP), Bijan Acosta [place], Mishra    Discussion:     I have explained all of the above in detail and the patient understands all of the current recommendation(s). I have answered all of their questions to the best of my ability and to their complete satisfaction.   The patient is to continue with the current management plan.            Electronically signed by Israel Neely MD

## 2018-10-02 ENCOUNTER — OFFICE VISIT (OUTPATIENT)
Dept: PULMONOLOGY | Facility: CLINIC | Age: 53
End: 2018-10-02
Payer: MEDICARE

## 2018-10-02 VITALS
HEART RATE: 63 BPM | BODY MASS INDEX: 22.2 KG/M2 | HEIGHT: 64 IN | DIASTOLIC BLOOD PRESSURE: 60 MMHG | WEIGHT: 130 LBS | OXYGEN SATURATION: 97 % | SYSTOLIC BLOOD PRESSURE: 102 MMHG

## 2018-10-02 DIAGNOSIS — J44.9 CHRONIC OBSTRUCTIVE PULMONARY DISEASE, UNSPECIFIED COPD TYPE: ICD-10-CM

## 2018-10-02 PROCEDURE — 99203 OFFICE O/P NEW LOW 30 MIN: CPT | Mod: ,,, | Performed by: INTERNAL MEDICINE

## 2018-10-02 RX ORDER — ALBUTEROL SULFATE 90 UG/1
2 AEROSOL, METERED RESPIRATORY (INHALATION) EVERY 6 HOURS PRN
Qty: 18 G | Refills: 5 | Status: SHIPPED | OUTPATIENT
Start: 2018-10-02 | End: 2019-11-18

## 2018-10-02 NOTE — PROGRESS NOTES
"New Office Visit/Consultation Note    Patient Name: Clara Murillo  MRN: [id number]  : 1965      Reason for visit: COPD    HPI:     10/2/2018 - Referred by Dr Neely for evaluation for possible COPD.  She states that she had PFT "years ago".  Has trouble with SOB, LYNCH.  Had PFT done at [facility] a few months ago.  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  She has been diagnosed with "fatty liver" and is to see GI MD.  Former smoker quit , smoked for about 15 years about 1 PPD.  Worked at [employer] ([id number]) did have exposure tyo chemicals and asbestos (gaskets).    Past Medical History    Past Medical History:   Diagnosis Date    Anemia     Estrogen receptor negative status (ER-) 5/15/2017    Malignant neoplasm of right breast 5/15/2017    Skin cancer        Past Surgical History    History reviewed. No pertinent surgical history.    Medications      Current Outpatient Medications:     ascorbic acid, vitamin C, (VITAMIN C) 100 MG tablet, Take 100 mg by mouth once daily., Disp: , Rfl:     aspirin 325 MG tablet, Take 325 mg by mouth once daily., Disp: , Rfl:     enalapril (VASOTEC) 5 MG tablet, Take 5 mg by mouth once daily., Disp: , Rfl:     ezetimibe (ZETIA) 10 mg tablet, Take 10 mg by mouth once daily., Disp: , Rfl:     gabapentin (NEURONTIN) 300 MG capsule, gabapentin 300 mg capsule, Disp: , Rfl:     hydrocodone-acetaminophen 10-325mg (NORCO)  mg Tab, hydrocodone 10 mg-acetaminophen 325 mg tablet, Disp: , Rfl:     isosorbide mononitrate (IMDUR) 60 MG 24 hr tablet, TAKE ONE TABLET BY MOUTH EVERY DAY THANK YOU, Disp: , Rfl: 11    zolpidem (AMBIEN) 10 mg Tab, Take 5 mg by mouth nightly as needed., Disp: , Rfl:     albuterol (PROVENTIL/VENTOLIN HFA) 90 mcg/actuation inhaler, Inhale 2 puffs into the lungs every 6 (six) hours as needed for Wheezing. Rescue, Disp: 18 g, Rfl: 5    umeclidinium-vilanterol (ANORO ELLIPTA) 62.5-25 mcg/actuation DsDv, Inhale 1 puff " into the lungs once daily. Controller, Disp: 1 each, Rfl: 5    Current Facility-Administered Medications:     cyanocobalamin injection 100 mcg, 100 mcg, Intramuscular, Q30 Days, Israel Neely MD, 1,000 mcg at 18 0939    Allergies    Review of patient's allergies indicates:   Allergen Reactions    Pcn [penicillins] Hives       SocHx    Social History     Tobacco Use   Smoking Status Former Smoker    Last attempt to quit:     Years since quittin.7   Smokeless Tobacco Never Used       Social History     Substance and Sexual Activity   Alcohol Use No       Drug Use - no  Occupation - retired, as above  Asbestos exposure - possible  Pets - dog    FMHx    Family History   Problem Relation Age of Onset    Cancer Mother     Hypertension Mother     Diabetes Mother     Cancer Father     Hypertension Father          Review of Systems  Review of Systems   Constitutional: Negative for chills, diaphoresis, fever, malaise/fatigue and weight loss.   HENT: Positive for hearing loss. Negative for congestion, ear discharge, ear pain, nosebleeds, sinus pain, sore throat and tinnitus.    Eyes: Positive for blurred vision. Negative for double vision, photophobia, pain, discharge and redness.   Respiratory: Positive for shortness of breath and wheezing. Negative for cough, hemoptysis, sputum production and stridor.    Cardiovascular: Negative for chest pain, palpitations, orthopnea, claudication, leg swelling and PND.        Had recent heart evaluation which was negative   Gastrointestinal: Positive for heartburn. Negative for abdominal pain, blood in stool, constipation, diarrhea, melena, nausea and vomiting.        Polyps   Genitourinary: Negative for dysuria, flank pain, frequency, hematuria and urgency.   Musculoskeletal: Negative for back pain, falls, joint pain, myalgias and neck pain.   Skin: Negative for itching and rash.   Neurological: Negative for dizziness, tingling, tremors, sensory change, speech  "change, focal weakness, seizures, loss of consciousness, weakness and headaches.        H/o TIA  X 2   Endo/Heme/Allergies: Negative for environmental allergies and polydipsia. Does not bruise/bleed easily.   Psychiatric/Behavioral: Negative for depression, hallucinations, memory loss, substance abuse and suicidal ideas. The patient is not nervous/anxious and does not have insomnia.        Physical Exam    Vitals:    10/02/18 1029   BP: 102/60   Pulse: 63   SpO2: 97%   Weight: 59 kg (130 lb)   Height: 5' 4" (1.626 m)       Physical Exam   Constitutional: She is oriented to person, place, and time. She appears well-developed and well-nourished. No distress.   HENT:   Head: Normocephalic and atraumatic.   Right Ear: External ear normal.   Left Ear: External ear normal.   Nose: Nose normal.   Mouth/Throat: Oropharynx is clear and moist.   Dentures upper lower   Eyes: Conjunctivae and EOM are normal. Pupils are equal, round, and reactive to light.   Neck: Normal range of motion. Neck supple. No JVD present. No tracheal deviation present. No thyromegaly present.   Cardiovascular: Normal rate, regular rhythm, normal heart sounds and intact distal pulses. Exam reveals no gallop and no friction rub.   No murmur heard.  Pulmonary/Chest: Effort normal and breath sounds normal. No stridor. No respiratory distress. She has no wheezes. She has no rales. She exhibits no tenderness.   Abdominal: Soft. Bowel sounds are normal. She exhibits no distension. There is no tenderness.   Musculoskeletal: Normal range of motion. She exhibits no edema or tenderness.   Lymphadenopathy:     She has no cervical adenopathy.   Neurological: She is alert and oriented to person, place, and time. No cranial nerve deficit.   Skin: Skin is warm and dry. She is not diaphoretic.   Psychiatric: She has a normal mood and affect. Her behavior is normal.   Nursing note and vitals reviewed.      Labs    Lab Results   Component Value Date    WBC 4.23 " 05/07/2018    HGB 11.6 (L) 05/07/2018    HCT 33.3 (L) 05/07/2018     05/07/2018       Sodium   Date Value Ref Range Status   05/07/2018 139 136 - 145 mmol/L Final     Potassium   Date Value Ref Range Status   05/07/2018 3.5 3.5 - 5.1 mmol/L Final     Chloride   Date Value Ref Range Status   05/07/2018 106 95 - 110 mmol/L Final     CO2   Date Value Ref Range Status   05/07/2018 25 23 - 29 mmol/L Final     Glucose   Date Value Ref Range Status   05/07/2018 128 (H) 70 - 110 mg/dL Final     BUN, Bld   Date Value Ref Range Status   05/07/2018 15 6 - 20 mg/dL Final     Creatinine   Date Value Ref Range Status   05/07/2018 1.0 0.5 - 1.4 mg/dL Final     Calcium   Date Value Ref Range Status   05/07/2018 9.1 8.7 - 10.5 mg/dL Final     Total Protein   Date Value Ref Range Status   05/07/2018 6.7 6.0 - 8.4 g/dL Final     Albumin   Date Value Ref Range Status   05/07/2018 3.9 3.5 - 5.2 g/dL Final     Total Bilirubin   Date Value Ref Range Status   05/07/2018 0.5 0.1 - 1.0 mg/dL Final     Comment:     For infants and newborns, interpretation of results should be based  on gestational age, weight and in agreement with clinical  observations.  Premature Infant recommended reference ranges:  Up to 24 hours.............<8.0 mg/dL  Up to 48 hours............<12.0 mg/dL  3-5 days..................<15.0 mg/dL  6-29 days.................<15.0 mg/dL       Alkaline Phosphatase   Date Value Ref Range Status   05/07/2018 82 55 - 135 U/L Final     AST   Date Value Ref Range Status   05/07/2018 35 10 - 40 U/L Final     ALT   Date Value Ref Range Status   05/07/2018 43 10 - 44 U/L Final     Anion Gap   Date Value Ref Range Status   05/07/2018 8 8 - 16 mmol/L Final       Xrays        Impression/Plan    Problem List Items Addressed This Visit        Pulmonary    COPD (chronic obstructive pulmonary disease)  - will locate PFT done in [place]  - trial of ANORO (RX and free month coupon given)  - prn beta agonist  - RTC 3 months    H/o  cigarette use  - quit 2014          I have spent about 30 minutes with the patient taking the history and examining the patient.  We have discussed the diagnoses and current plan and all questions have been answered.  We have discussed the follow up plan.  The patient and family (if present) know to contact the office with any questions they may have.        Victor Hugo Shah MD

## 2018-10-02 NOTE — LETTER
October 2, 2018      Israel Neely MD  [address]  [address]  [place] [address]           [facility] - Pulmonology  [address]  [address]  [place] [address]  Phone: [phone]          Patient: Clara Murillo   MR Number: [id number]   YOB: 1965   Date of Visit: 10/2/2018       Dear Dr. Israel Neely:    Thank you for referring Clara Murillo to me for evaluation. Attached you will find relevant portions of my assessment and plan of care.    If you have questions, please do not hesitate to call me. I look forward to following Clara Murillo along with you.    Sincerely,    Victor Hugo hSah MD    Enclosure  CC:  No Recipients    If you would like to receive this communication electronically, please contact [email or web address] or [phone] to request more information on [employer] Link access.    For providers and/or their staff who would like to refer a patient to [facility], please contact us through our one-stop-shop provider referral line, [facility], at [phone].    If you feel you have received this communication in error or would no longer like to receive these types of communications, please e-mail [email or web address]

## 2018-10-03 ENCOUNTER — TELEPHONE (OUTPATIENT)
Dept: PULMONOLOGY | Facility: CLINIC | Age: 53
End: 2018-10-03

## 2018-10-03 DIAGNOSIS — K76.0 FATTY LIVER: Primary | ICD-10-CM

## 2018-10-03 NOTE — TELEPHONE ENCOUNTER
----- Message from Brandy Gonzalez LPN sent at 10/2/2018  3:08 PM CDT -----      ----- Message -----  From: Avani Espinal  Sent: 10/2/2018   2:54 PM  To: Brandy Gonzalez LPN    PFT, [facility], 7/26/18

## 2018-10-15 ENCOUNTER — TELEPHONE (OUTPATIENT)
Dept: PULMONOLOGY | Facility: CLINIC | Age: 53
End: 2018-10-15

## 2018-10-15 ENCOUNTER — HOSPITAL ENCOUNTER (OUTPATIENT)
Dept: RADIOLOGY | Facility: HOSPITAL | Age: 53
Discharge: HOME | End: 2018-10-15
Attending: INTERNAL MEDICINE
Payer: MEDICARE

## 2018-10-15 DIAGNOSIS — K76.0 FATTY LIVER: ICD-10-CM

## 2018-10-15 PROCEDURE — 76705 ECHO EXAM OF ABDOMEN: CPT | Mod: 26,,, | Performed by: RADIOLOGY

## 2018-10-15 PROCEDURE — 76705 ECHO EXAM OF ABDOMEN: CPT | Mod: TC

## 2018-10-15 RX ORDER — GABAPENTIN 300 MG/1
300 CAPSULE ORAL 3 TIMES DAILY
Qty: 90 CAPSULE | Refills: 3 | Status: SHIPPED | OUTPATIENT
Start: 2018-10-15 | End: 2019-07-16 | Stop reason: SDUPTHER

## 2018-10-15 RX ORDER — ZOLPIDEM TARTRATE 10 MG/1
10 TABLET ORAL NIGHTLY PRN
Qty: 30 TABLET | Refills: 3 | Status: SHIPPED | OUTPATIENT
Start: 2018-10-15 | End: 2019-02-28 | Stop reason: SDUPTHER

## 2018-10-15 NOTE — TELEPHONE ENCOUNTER
insists the eye twitching and headaches are from the anoro, been happening everyday since she started. Asking if there is an alternative to try

## 2018-10-15 NOTE — TELEPHONE ENCOUNTER
Pt called stating that she is having complications using the Anoro device. Also states that she has (L) eye twitching, wonders if it could be a side affect of the inhaler and leg pain (says that she has neuropathy). Would like a call back.

## 2018-10-15 NOTE — TELEPHONE ENCOUNTER
----- Message from Nina Khanna sent at 10/15/2018  2:04 PM CDT -----  Type:  RX Refill Request    Who Called: self Refill or New Rx: refill   RX Name and Strength:  Gabapentin, ambien  How is the patient currently taking it? (ex. 1XDay):   Is this a 30 day or 90 day RX:  30 day supply   Preferred Pharmacy with phone number:  [facility] pharmacy  Local or Mail Order:  Local   Ordering Provider:  Dr Macdonald Call Back Number: [phone]  Additional Information:

## 2018-10-16 RX ORDER — NITROGLYCERIN 0.4 MG/1
0.4 TABLET SUBLINGUAL EVERY 5 MIN PRN
COMMUNITY
End: 2018-10-16 | Stop reason: SDUPTHER

## 2018-10-16 RX ORDER — NITROGLYCERIN 0.4 MG/1
0.4 TABLET SUBLINGUAL EVERY 5 MIN PRN
Qty: 25 TABLET | Refills: 5 | Status: SHIPPED | OUTPATIENT
Start: 2018-10-16 | End: 2019-11-04 | Stop reason: SDUPTHER

## 2019-01-11 ENCOUNTER — TELEPHONE (OUTPATIENT)
Dept: HEMATOLOGY/ONCOLOGY | Facility: CLINIC | Age: 54
End: 2019-01-11

## 2019-01-14 ENCOUNTER — OFFICE VISIT (OUTPATIENT)
Dept: HEMATOLOGY/ONCOLOGY | Facility: CLINIC | Age: 54
End: 2019-01-14
Payer: MEDICARE

## 2019-01-14 ENCOUNTER — LAB VISIT (OUTPATIENT)
Dept: LAB | Facility: HOSPITAL | Age: 54
End: 2019-01-14
Attending: INTERNAL MEDICINE
Payer: MEDICARE

## 2019-01-14 VITALS
TEMPERATURE: 98 F | RESPIRATION RATE: 20 BRPM | SYSTOLIC BLOOD PRESSURE: 122 MMHG | HEART RATE: 84 BPM | DIASTOLIC BLOOD PRESSURE: 70 MMHG | BODY MASS INDEX: 22.5 KG/M2 | WEIGHT: 131.13 LBS

## 2019-01-14 DIAGNOSIS — Z17.1 ESTROGEN RECEPTOR NEGATIVE TUMOR STATUS: ICD-10-CM

## 2019-01-14 DIAGNOSIS — D64.9 ANEMIA: ICD-10-CM

## 2019-01-14 DIAGNOSIS — C50.211 MALIGNANT NEOPLASM OF UPPER-INNER QUADRANT OF RIGHT BREAST IN FEMALE, ESTROGEN RECEPTOR NEGATIVE: Primary | ICD-10-CM

## 2019-01-14 DIAGNOSIS — G62.9 NEUROPATHY: Primary | ICD-10-CM

## 2019-01-14 DIAGNOSIS — Z87.891 PERSONAL HISTORY OF TOBACCO USE, PRESENTING HAZARDS TO HEALTH: ICD-10-CM

## 2019-01-14 DIAGNOSIS — E53.8 B12 DEFICIENCY: ICD-10-CM

## 2019-01-14 DIAGNOSIS — C50.211 MALIGNANT NEOPLASM OF UPPER-INNER QUADRANT OF RIGHT FEMALE BREAST: ICD-10-CM

## 2019-01-14 DIAGNOSIS — Z17.1 MALIGNANT NEOPLASM OF UPPER-INNER QUADRANT OF RIGHT BREAST IN FEMALE, ESTROGEN RECEPTOR NEGATIVE: Primary | ICD-10-CM

## 2019-01-14 DIAGNOSIS — G89.29 CHRONIC PAIN: ICD-10-CM

## 2019-01-14 LAB
ALBUMIN SERPL BCP-MCNC: 4 G/DL
ALP SERPL-CCNC: 90 U/L
ALT SERPL W/O P-5'-P-CCNC: 24 U/L
ANION GAP SERPL CALC-SCNC: 6 MMOL/L
AST SERPL-CCNC: 23 U/L
BILIRUB SERPL-MCNC: 0.3 MG/DL
BUN SERPL-MCNC: 12 MG/DL
CALCIUM SERPL-MCNC: 9.2 MG/DL
CHLORIDE SERPL-SCNC: 106 MMOL/L
CO2 SERPL-SCNC: 27 MMOL/L
CREAT SERPL-MCNC: 0.8 MG/DL
EST. GFR  (AFRICAN AMERICAN): >60 ML/MIN/1.73 M^2
EST. GFR  (NON AFRICAN AMERICAN): >60 ML/MIN/1.73 M^2
GLUCOSE SERPL-MCNC: 90 MG/DL
POTASSIUM SERPL-SCNC: 3.9 MMOL/L
PROT SERPL-MCNC: 7 G/DL
SODIUM SERPL-SCNC: 139 MMOL/L
VIT B12 SERPL-MCNC: 255 PG/ML

## 2019-01-14 PROCEDURE — 99213 PR OFFICE/OUTPT VISIT, EST, LEVL III, 20-29 MIN: ICD-10-PCS | Mod: ,,, | Performed by: INTERNAL MEDICINE

## 2019-01-14 PROCEDURE — 80053 COMPREHEN METABOLIC PANEL: CPT

## 2019-01-14 PROCEDURE — 99213 OFFICE O/P EST LOW 20 MIN: CPT | Mod: ,,, | Performed by: INTERNAL MEDICINE

## 2019-01-14 PROCEDURE — 82607 VITAMIN B-12: CPT

## 2019-01-14 PROCEDURE — 36415 COLL VENOUS BLD VENIPUNCTURE: CPT

## 2019-01-14 RX ORDER — CHOLECALCIFEROL (VITAMIN D3) 25 MCG
1000 TABLET ORAL DAILY
COMMUNITY

## 2019-01-14 NOTE — LETTER
January 14, 2019      Elina Acosta MD  [address] [address]  [address]  [place] [address]           [facility] - Hematology Oncology  [address]  [address]  [place] [address]  Phone: [phone]  Fax: [phone]          Patient: Clara Murillo   MR Number: [id number]   YOB: 1965   Date of Visit: 1/14/2019       Dear Dr. Elina Acosta:    Thank you for referring Clara Murillo to me for evaluation. Attached you will find relevant portions of my assessment and plan of care.    If you have questions, please do not hesitate to call me. I look forward to following Clara Murillo along with you.    Sincerely,    Israel Neely MD    Enclosure  CC:  No Recipients    If you would like to receive this communication electronically, please contact [email or web address] or [phone] to request more information on [employer] Link access.    For providers and/or their staff who would like to refer a patient to [facility], please contact us through our one-stop-shop provider referral line, [facility], at [phone].    If you feel you have received this communication in error or would no longer like to receive these types of communications, please e-mail [email or web address]

## 2019-01-14 NOTE — PROGRESS NOTES
[facility] Hematology/Oncology  PROGRESS NOTE      Subjective:       Patient ID:   NAME: Clara Murillo : 1965     53 y.o. female    Referring Doc: Dave  Other Physicians: Lucila (new [facility]), Juan Diego (PCP-[facility]); Kalina (Neuro); Bhaskar    Chief Complaint:  breast ca  f/u    History of Present Illness:     Patient returns today for a regularly scheduled follow-up visit.  The patient is her by herself. She is doing ok. She has been seeing a heart specialist in [place] (Dr Gaytan). She denies any current CP, SOB,HA's or N/V. She has chronic SOB and LYNCH and is now on two different inhalers. She is here by herself. She had a colonoscopy with Dr Muro. She has fatty liver issues.         ROS:   GEN: normal without any fever, night sweats or weight loss  HEENT: normal with no HA's, sore throat, stiff neck, changes in vision  CV: normal with no CP, SOB, PND, LYNCH or orthopnea  PULM: normal with no SOB, cough, hemoptysis, sputum or pleuritic pain  GI: normal with no abdominal pain, nausea, vomiting, constipation, diarrhea, melanotic stools, BRBPR, or hematemesis  : normal with no hematuria, dysuria  BREAST: normal with no mass, discharge, pain  SKIN: normal with no rash, erythema, bruising, or swelling    Allergies:  Review of patient's allergies indicates:   Allergen Reactions    Pcn [penicillins] Hives       Medications:    Current Outpatient Medications:     ascorbic acid, vitamin C, (VITAMIN C) 100 MG tablet, Take 100 mg by mouth once daily., Disp: , Rfl:     aspirin 325 MG tablet, Take 325 mg by mouth once daily., Disp: , Rfl:     enalapril (VASOTEC) 5 MG tablet, Take 5 mg by mouth once daily., Disp: , Rfl:     ezetimibe (ZETIA) 10 mg tablet, Take 10 mg by mouth once daily., Disp: , Rfl:     gabapentin (NEURONTIN) 300 MG capsule, Take 1 capsule (300 mg total) by mouth 3 (three) times daily., Disp: 90 capsule, Rfl: 3    hydrocodone-acetaminophen 10-325mg (NORCO)  mg Tab, hydrocodone 10  mg-acetaminophen 325 mg tablet, Disp: , Rfl:     isosorbide mononitrate (IMDUR) 60 MG 24 hr tablet, TAKE ONE TABLET BY MOUTH EVERY DAY THANK YOU, Disp: , Rfl: 11    nitroGLYCERIN (NITROSTAT) 0.4 MG SL tablet, Place 1 tablet (0.4 mg total) under the tongue every 5 (five) minutes as needed., Disp: 25 tablet, Rfl: 5    umeclidinium-vilanterol (ANORO ELLIPTA) 62.5-25 mcg/actuation DsDv, Inhale 1 puff into the lungs once daily. Controller, Disp: 1 each, Rfl: 5    vitamin D (VITAMIN D3) 1000 units Tab, Take 1,000 Units by mouth once daily., Disp: , Rfl:     zolpidem (AMBIEN) 10 mg Tab, Take 1 tablet (10 mg total) by mouth nightly as needed., Disp: 30 tablet, Rfl: 3    albuterol (PROVENTIL/VENTOLIN HFA) 90 mcg/actuation inhaler, Inhale 2 puffs into the lungs every 6 (six) hours as needed for Wheezing. Rescue, Disp: 18 g, Rfl: 5    Current Facility-Administered Medications:     cyanocobalamin injection 100 mcg, 100 mcg, Intramuscular, Q30 Days, Israel Neely MD, 1,000 mcg at 06/02/18 0945    PMHx/PSHx Updates:  See patient's last visit with me on 8/9/2018.  See H&P in Vol #1        Pathology:  See Vol #1          Objective:     Vitals:  Blood pressure 122/70, pulse 84, temperature 98 °F (36.7 °C), resp. rate 20, weight 59.5 kg (131 lb 1.6 oz).    Physical Examination:   GEN: no apparent distress, comfortable; AAOx3  HEAD: atraumatic and normocephalic  EYES: no pallor, no icterus, PERRLA  ENT: OMM, no pharyngeal erythema, external ears WNL; no nasal discharge; no thrush  NECK: no masses, thyroid normal, trachea midline, no LAD/LN's, supple  CV: RRR with no murmur; normal pulse; normal S1 and S2; no pedal edema  CHEST: Normal respiratory effort; CTAB; normal breath sounds; no wheeze or crackles  ABDOM: nontender and nondistended; soft; normal bowel sounds; no rebound/guarding  MUSC/Skeletal: ROM normal; no crepitus; joints normal; no deformities or arthropathy  EXTREM: no clubbing, cyanosis, inflammation or  swelling  SKIN: no rashes, lesions, ulcers, petechiae or subcutaneous nodules; tattoos  : no keen  NEURO: grossly intact; motor/sensory WNL; AAOx3; no tremors  PSYCH: normal mood, affect and behavior  LYMPH: normal cervical, supraclavicular, axillary and groin LN's  Breast: no changes; bilateral mastectomies          Labs:   5/7/2018  Lab Results   Component Value Date    WBC 4.23 05/07/2018    HGB 11.6 (L) 05/07/2018    HCT 33.3 (L) 05/07/2018    MCV 87 05/07/2018     05/07/2018 1/14/2019  BMP  Lab Results   Component Value Date     01/14/2019    K 3.9 01/14/2019     01/14/2019    CO2 27 01/14/2019    BUN 12 01/14/2019    CREATININE 0.8 01/14/2019    CALCIUM 9.2 01/14/2019    ANIONGAP 6 (L) 01/14/2019    ESTGFRAFRICA >60.0 01/14/2019    EGFRNONAA >60.0 01/14/2019     Lab Results   Component Value Date    ALT 24 01/14/2019    AST 23 01/14/2019    ALKPHOS 90 01/14/2019    BILITOT 0.3 01/14/2019         Radiology/Diagnostic Studies:    PET/CT Fusion 6/4/2018:  IMPRESSION:    1. Negative for recurrent malignancy or metastatic disease.    2. Development of moderate hepatic steatosis.        CXR 5/31/2017 negative    I have reviewed all available lab results and radiology reports.    Assessment/Plan:     (1) 53 y.o. female with diagnosis of right breast cancer  - diagnosed in 2004  - Stage II  - s/p bilateral mastectomies  - followed by Dr Acosta with Gen Surgery  - s/p AC x4 and XRT  - triple negative  - CT scans in 3/2016  - PET on 6/4/2018 - negative for recurrent cancer     (2) hx/of TIA's and PAD  - followed by Dr Whitlock with cardiology and now by Dr Gaytan     (3) COPD and former smoker  - followed by Dr Wolff with pulm in past  - she is on two separate inhalers     (4) Right inferior auricular biopsy in Nov 2013 - atypical single unit junctional melanocytic hyperplasia with no evidence of malignancy  - Dr Acosta following       (5) Arthritic issues    (6) Skin cancer issues - followed  by Dr Manley (Derm)    (7) Lower extremity issues - she is seeing Dr Gilman/René with Neurology at [place] and she had a nerve conduction study and she plans to see ortho in future    (8) Chronic pain syndrome - followed by Pain management - Dr Barajas    (9) hepatic steatosis - s/p recent colonoscopy with Dr Muro             1. Malignant neoplasm of upper-inner quadrant of right breast in female, estrogen receptor negative     2. Estrogen receptor negative tumor status     3. Personal history of tobacco use, presenting hazards to health             PLAN:  1. F/u with PCP, card, dermatology, neuro and Gen Surg  2. Check labs periodically  3. F/U with GI and pulmonary  3. RTC in 6 months  Fax note to Chai Adamson (PCP), Bijan Acosta [place], Mishra; Bhaskar Wolff    Discussion:     I have explained all of the above in detail and the patient understands all of the current recommendation(s). I have answered all of their questions to the best of my ability and to their complete satisfaction.   The patient is to continue with the current management plan.            Electronically signed by Israel Neely MD

## 2019-01-17 ENCOUNTER — OFFICE VISIT (OUTPATIENT)
Dept: FAMILY MEDICINE | Facility: CLINIC | Age: 54
End: 2019-01-17
Payer: MEDICARE

## 2019-01-17 ENCOUNTER — TELEPHONE (OUTPATIENT)
Dept: FAMILY MEDICINE | Facility: CLINIC | Age: 54
End: 2019-01-17

## 2019-01-17 VITALS
WEIGHT: 130 LBS | HEART RATE: 67 BPM | BODY MASS INDEX: 22.2 KG/M2 | HEIGHT: 64 IN | TEMPERATURE: 99 F | DIASTOLIC BLOOD PRESSURE: 78 MMHG | SYSTOLIC BLOOD PRESSURE: 138 MMHG

## 2019-01-17 DIAGNOSIS — J32.9 SINUSITIS, UNSPECIFIED CHRONICITY, UNSPECIFIED LOCATION: Primary | ICD-10-CM

## 2019-01-17 DIAGNOSIS — R19.7 DIARRHEA, UNSPECIFIED TYPE: ICD-10-CM

## 2019-01-17 DIAGNOSIS — R10.84 STOMACH CRAMPS, GENERALIZED: ICD-10-CM

## 2019-01-17 PROCEDURE — 99213 PR OFFICE/OUTPT VISIT, EST, LEVL III, 20-29 MIN: ICD-10-PCS | Mod: S$GLB,,, | Performed by: NURSE PRACTITIONER

## 2019-01-17 PROCEDURE — 99213 OFFICE O/P EST LOW 20 MIN: CPT | Mod: S$GLB,,, | Performed by: NURSE PRACTITIONER

## 2019-01-17 RX ORDER — AZITHROMYCIN 250 MG/1
TABLET, FILM COATED ORAL
Qty: 6 TABLET | Refills: 0 | Status: SHIPPED | OUTPATIENT
Start: 2019-01-17 | End: 2019-01-22

## 2019-01-17 NOTE — PROGRESS NOTES
Subjective:       Patient ID: Clara Banuelosner is a 53 y.o. female.    Chief Complaint: Fever and stomach cramps    54 y/o female presents with c/o sore throat, PND, stomach cramps, fever, chills and loose stools x 3 days with symptoms to worsen (see below).     Sinusitis   This is a new problem. The current episode started in the past 7 days. The problem has been gradually worsening since onset. The maximum temperature recorded prior to her arrival was 100.4 - 100.9 F. The fever has been present for 1 to 2 days. Associated symptoms include chills, congestion, coughing, sneezing and a sore throat. Pertinent negatives include no diaphoresis, ear pain, headaches, shortness of breath or sinus pressure. Past treatments include oral decongestants, lying down and acetaminophen. The treatment provided no relief.     Review of Systems   Constitutional: Positive for chills and fever. Negative for diaphoresis and unexpected weight change.   HENT: Positive for congestion, postnasal drip, rhinorrhea, sneezing, sore throat and trouble swallowing. Negative for dental problem, ear pain and sinus pressure.    Eyes: Negative for visual disturbance.   Respiratory: Positive for cough. Negative for shortness of breath and wheezing.    Cardiovascular: Negative for chest pain and palpitations.   Gastrointestinal: Positive for abdominal pain. Negative for constipation, diarrhea, nausea and vomiting.   Endocrine: Negative for polydipsia and polyuria.   Genitourinary: Negative for dysuria.   Musculoskeletal: Negative for joint swelling.   Skin: Negative for rash.   Neurological: Negative for syncope and headaches.   Psychiatric/Behavioral: Negative for agitation and confusion.       Objective:      Physical Exam   Constitutional: She is oriented to person, place, and time. She appears well-developed and well-nourished.   Does not look like she feels well   HENT:   Head: Normocephalic.   Eyes: Pupils are equal, round, and reactive to light.    Neck: Normal range of motion. Neck supple.   Cardiovascular: Normal rate, regular rhythm and normal heart sounds.   Pulmonary/Chest: Effort normal and breath sounds normal.   Abdominal: Soft. Bowel sounds are normal.   Musculoskeletal: Normal range of motion.   Neurological: She is alert and oriented to person, place, and time.   Skin: Skin is warm and dry. Capillary refill takes less than 2 seconds.   Psychiatric: She has a normal mood and affect. Judgment and thought content normal.   Vitals reviewed.      Assessment:       1. Sinusitis, unspecified chronicity, unspecified location    2. Stomach cramps, generalized    3. Diarrhea, unspecified type        Plan:       1- zpack  2- use imodium or Pepto bismol for upset stomach and diarrhea.  3- stay hydrated and rest

## 2019-01-17 NOTE — TELEPHONE ENCOUNTER
Pt states she has fever, sore throat, and stomach cramps. Appt with NP scheduled for today at 4pm.   No other concerns at this time.      ----- Message from Obey Russell sent at 1/17/2019 10:11 AM CST -----  Type:  Same Day Appointment Request    Caller is requesting a same day appointment.  Caller declined first available appointment listed below.      Name of Caller:  Patient  When is the first available appointment?  01/21  Symptoms:  Fever, sore throat, stomach cramps  Best Call Back Number:  [phone]  Additional Information:

## 2019-02-28 RX ORDER — ZOLPIDEM TARTRATE 10 MG/1
TABLET ORAL
Qty: 30 TABLET | Refills: 5 | Status: SHIPPED | OUTPATIENT
Start: 2019-02-28 | End: 2019-12-10

## 2019-03-11 ENCOUNTER — TELEPHONE (OUTPATIENT)
Dept: FAMILY MEDICINE | Facility: CLINIC | Age: 54
End: 2019-03-11

## 2019-03-11 DIAGNOSIS — E78.5 HYPERLIPIDEMIA, UNSPECIFIED HYPERLIPIDEMIA TYPE: ICD-10-CM

## 2019-03-11 DIAGNOSIS — Z13.220 LIPID SCREENING: Primary | ICD-10-CM

## 2019-03-11 DIAGNOSIS — I10 HYPERTENSION, UNSPECIFIED TYPE: Primary | ICD-10-CM

## 2019-03-11 RX ORDER — ISOSORBIDE MONONITRATE 60 MG/1
TABLET, EXTENDED RELEASE ORAL
Qty: 30 TABLET | Refills: 1 | Status: SHIPPED | OUTPATIENT
Start: 2019-03-11 | End: 2019-05-13 | Stop reason: SDUPTHER

## 2019-03-11 RX ORDER — EZETIMIBE 10 MG/1
TABLET ORAL
Qty: 30 TABLET | Refills: 1 | Status: SHIPPED | OUTPATIENT
Start: 2019-03-11 | End: 2019-05-13 | Stop reason: SDUPTHER

## 2019-03-11 RX ORDER — ENALAPRIL MALEATE 5 MG/1
TABLET ORAL
Qty: 30 TABLET | Refills: 1 | Status: SHIPPED | OUTPATIENT
Start: 2019-03-11 | End: 2019-05-13 | Stop reason: SDUPTHER

## 2019-03-12 NOTE — TELEPHONE ENCOUNTER
Pt scheduled for lab work on Saturaday, March 16.  Please add/sign orders.    Thank you,  Concetta        ----- Message from Juan Monaco sent at 3/12/2019 12:54 PM CDT -----  Contact: Patient  Type:  Patient Returning Call    Who Called:  Patient  Who Left Message for Patient:  Concetta  Does the patient know what this is regarding?:  Scheduling labs   Best Call Back Number:  [phone]  Additional Information:  No orders in Epic. Please advise

## 2019-03-14 ENCOUNTER — TELEPHONE (OUTPATIENT)
Dept: FAMILY MEDICINE | Facility: CLINIC | Age: 54
End: 2019-03-14

## 2019-03-14 DIAGNOSIS — Z79.899 HIGH RISK MEDICATION USE: ICD-10-CM

## 2019-03-14 DIAGNOSIS — E78.5 HYPERLIPIDEMIA, UNSPECIFIED HYPERLIPIDEMIA TYPE: Primary | ICD-10-CM

## 2019-03-16 ENCOUNTER — LAB VISIT (OUTPATIENT)
Dept: LAB | Facility: HOSPITAL | Age: 54
End: 2019-03-16
Attending: FAMILY MEDICINE
Payer: MEDICARE

## 2019-03-16 DIAGNOSIS — Z87.891 PERSONAL HISTORY OF TOBACCO USE, PRESENTING HAZARDS TO HEALTH: ICD-10-CM

## 2019-03-16 DIAGNOSIS — C50.211 MALIGNANT NEOPLASM OF UPPER-INNER QUADRANT OF RIGHT BREAST IN FEMALE, ESTROGEN RECEPTOR NEGATIVE: ICD-10-CM

## 2019-03-16 DIAGNOSIS — Z17.1 ESTROGEN RECEPTOR NEGATIVE TUMOR STATUS: ICD-10-CM

## 2019-03-16 DIAGNOSIS — Z17.1 MALIGNANT NEOPLASM OF UPPER-INNER QUADRANT OF RIGHT BREAST IN FEMALE, ESTROGEN RECEPTOR NEGATIVE: ICD-10-CM

## 2019-03-16 LAB
ALBUMIN SERPL BCP-MCNC: 4.3 G/DL
ALP SERPL-CCNC: 97 U/L
ALT SERPL W/O P-5'-P-CCNC: 32 U/L
ANION GAP SERPL CALC-SCNC: 10 MMOL/L
AST SERPL-CCNC: 25 U/L
BASOPHILS # BLD AUTO: 0.05 K/UL
BASOPHILS NFR BLD: 1.1 %
BILIRUB SERPL-MCNC: 0.4 MG/DL
BUN SERPL-MCNC: 17 MG/DL
CALCIUM SERPL-MCNC: 9.2 MG/DL
CHLORIDE SERPL-SCNC: 105 MMOL/L
CO2 SERPL-SCNC: 24 MMOL/L
CREAT SERPL-MCNC: 0.9 MG/DL
DIFFERENTIAL METHOD: NORMAL
EOSINOPHIL # BLD AUTO: 0.1 K/UL
EOSINOPHIL NFR BLD: 2.9 %
ERYTHROCYTE [DISTWIDTH] IN BLOOD BY AUTOMATED COUNT: 12.3 %
EST. GFR  (AFRICAN AMERICAN): >60 ML/MIN/1.73 M^2
EST. GFR  (NON AFRICAN AMERICAN): >60 ML/MIN/1.73 M^2
GLUCOSE SERPL-MCNC: 103 MG/DL
HCT VFR BLD AUTO: 38.2 %
HGB BLD-MCNC: 12.7 G/DL
IMM GRANULOCYTES # BLD AUTO: 0.02 K/UL
IMM GRANULOCYTES NFR BLD AUTO: 0.4 %
LYMPHOCYTES # BLD AUTO: 1.6 K/UL
LYMPHOCYTES NFR BLD: 34.8 %
MCH RBC QN AUTO: 29.5 PG
MCHC RBC AUTO-ENTMCNC: 33.2 G/DL
MCV RBC AUTO: 89 FL
MONOCYTES # BLD AUTO: 0.4 K/UL
MONOCYTES NFR BLD: 9.1 %
NEUTROPHILS # BLD AUTO: 2.3 K/UL
NEUTROPHILS NFR BLD: 51.7 %
NRBC BLD-RTO: 0 /100 WBC
PLATELET # BLD AUTO: 226 K/UL
PMV BLD AUTO: 9.6 FL
POTASSIUM SERPL-SCNC: 3.7 MMOL/L
PROT SERPL-MCNC: 7.5 G/DL
RBC # BLD AUTO: 4.31 M/UL
SODIUM SERPL-SCNC: 139 MMOL/L
WBC # BLD AUTO: 4.51 K/UL

## 2019-03-16 PROCEDURE — 36415 COLL VENOUS BLD VENIPUNCTURE: CPT

## 2019-03-16 PROCEDURE — 85025 COMPLETE CBC W/AUTO DIFF WBC: CPT

## 2019-03-16 PROCEDURE — 80053 COMPREHEN METABOLIC PANEL: CPT

## 2019-05-13 DIAGNOSIS — I10 HYPERTENSION, UNSPECIFIED TYPE: ICD-10-CM

## 2019-05-13 DIAGNOSIS — E78.5 HYPERLIPIDEMIA, UNSPECIFIED HYPERLIPIDEMIA TYPE: ICD-10-CM

## 2019-05-13 RX ORDER — ISOSORBIDE MONONITRATE 60 MG/1
TABLET, EXTENDED RELEASE ORAL
Qty: 30 TABLET | Refills: 1 | Status: SHIPPED | OUTPATIENT
Start: 2019-05-13 | End: 2019-07-10 | Stop reason: SDUPTHER

## 2019-05-13 RX ORDER — EZETIMIBE 10 MG/1
TABLET ORAL
Qty: 30 TABLET | Refills: 1 | Status: SHIPPED | OUTPATIENT
Start: 2019-05-13 | End: 2019-07-10 | Stop reason: SDUPTHER

## 2019-05-13 RX ORDER — ENALAPRIL MALEATE 5 MG/1
TABLET ORAL
Qty: 30 TABLET | Refills: 1 | Status: SHIPPED | OUTPATIENT
Start: 2019-05-13 | End: 2019-07-10 | Stop reason: SDUPTHER

## 2019-05-21 ENCOUNTER — TELEPHONE (OUTPATIENT)
Dept: ALLERGY | Facility: CLINIC | Age: 54
End: 2019-05-21

## 2019-07-10 ENCOUNTER — TELEPHONE (OUTPATIENT)
Dept: HEMATOLOGY/ONCOLOGY | Facility: CLINIC | Age: 54
End: 2019-07-10

## 2019-07-10 DIAGNOSIS — I10 HYPERTENSION, UNSPECIFIED TYPE: ICD-10-CM

## 2019-07-10 DIAGNOSIS — E78.5 HYPERLIPIDEMIA, UNSPECIFIED HYPERLIPIDEMIA TYPE: ICD-10-CM

## 2019-07-10 RX ORDER — ISOSORBIDE MONONITRATE 60 MG/1
TABLET, EXTENDED RELEASE ORAL
Qty: 30 TABLET | Refills: 1 | Status: SHIPPED | OUTPATIENT
Start: 2019-07-10 | End: 2019-09-09 | Stop reason: SDUPTHER

## 2019-07-10 RX ORDER — EZETIMIBE 10 MG/1
TABLET ORAL
Qty: 30 TABLET | Refills: 1 | Status: SHIPPED | OUTPATIENT
Start: 2019-07-10 | End: 2019-09-09 | Stop reason: SDUPTHER

## 2019-07-10 RX ORDER — ENALAPRIL MALEATE 5 MG/1
TABLET ORAL
Qty: 30 TABLET | Refills: 1 | Status: SHIPPED | OUTPATIENT
Start: 2019-07-10 | End: 2019-09-09 | Stop reason: SDUPTHER

## 2019-07-10 NOTE — TELEPHONE ENCOUNTER
Called to see if the pt had any labs done prior to f/u appt.    LM for the labs to be done @ [place].     Pt will have the labs done.

## 2019-07-17 RX ORDER — GABAPENTIN 300 MG/1
300 CAPSULE ORAL 3 TIMES DAILY
Qty: 90 CAPSULE | Refills: 3 | Status: SHIPPED | OUTPATIENT
Start: 2019-07-17 | End: 2019-11-04 | Stop reason: SDUPTHER

## 2019-08-19 ENCOUNTER — LAB VISIT (OUTPATIENT)
Dept: LAB | Facility: HOSPITAL | Age: 54
End: 2019-08-19
Attending: INTERNAL MEDICINE
Payer: MEDICARE

## 2019-08-19 DIAGNOSIS — Z17.1 ESTROGEN RECEPTOR NEGATIVE TUMOR STATUS: ICD-10-CM

## 2019-08-19 DIAGNOSIS — Z87.891 PERSONAL HISTORY OF TOBACCO USE, PRESENTING HAZARDS TO HEALTH: ICD-10-CM

## 2019-08-19 DIAGNOSIS — Z17.1 MALIGNANT NEOPLASM OF UPPER-INNER QUADRANT OF RIGHT BREAST IN FEMALE, ESTROGEN RECEPTOR NEGATIVE: ICD-10-CM

## 2019-08-19 DIAGNOSIS — C50.211 MALIGNANT NEOPLASM OF UPPER-INNER QUADRANT OF RIGHT BREAST IN FEMALE, ESTROGEN RECEPTOR NEGATIVE: ICD-10-CM

## 2019-08-19 LAB
ALBUMIN SERPL BCP-MCNC: 4.4 G/DL (ref 3.5–5.2)
ALP SERPL-CCNC: 79 U/L (ref 55–135)
ALT SERPL W/O P-5'-P-CCNC: 26 U/L (ref 10–44)
ANION GAP SERPL CALC-SCNC: 6 MMOL/L (ref 8–16)
AST SERPL-CCNC: 22 U/L (ref 10–40)
BASOPHILS # BLD AUTO: 0.04 K/UL (ref 0–0.2)
BASOPHILS NFR BLD: 1 % (ref 0–1.9)
BILIRUB SERPL-MCNC: 0.5 MG/DL (ref 0.1–1)
BUN SERPL-MCNC: 16 MG/DL (ref 6–20)
CALCIUM SERPL-MCNC: 9 MG/DL (ref 8.7–10.5)
CHLORIDE SERPL-SCNC: 107 MMOL/L (ref 95–110)
CO2 SERPL-SCNC: 25 MMOL/L (ref 23–29)
CREAT SERPL-MCNC: 0.9 MG/DL (ref 0.5–1.4)
DIFFERENTIAL METHOD: NORMAL
EOSINOPHIL # BLD AUTO: 0.1 K/UL (ref 0–0.5)
EOSINOPHIL NFR BLD: 2.7 % (ref 0–8)
ERYTHROCYTE [DISTWIDTH] IN BLOOD BY AUTOMATED COUNT: 12.8 % (ref 11.5–14.5)
EST. GFR  (AFRICAN AMERICAN): >60 ML/MIN/1.73 M^2
EST. GFR  (NON AFRICAN AMERICAN): >60 ML/MIN/1.73 M^2
GLUCOSE SERPL-MCNC: 78 MG/DL (ref 70–110)
HCT VFR BLD AUTO: 37.6 % (ref 37–48.5)
HGB BLD-MCNC: 12.4 G/DL (ref 12–16)
IMM GRANULOCYTES # BLD AUTO: 0.02 K/UL (ref 0–0.04)
IMM GRANULOCYTES NFR BLD AUTO: 0.5 % (ref 0–0.5)
LYMPHOCYTES # BLD AUTO: 1.7 K/UL (ref 1–4.8)
LYMPHOCYTES NFR BLD: 41.8 % (ref 18–48)
MCH RBC QN AUTO: 29.4 PG (ref 27–31)
MCHC RBC AUTO-ENTMCNC: 33 G/DL (ref 32–36)
MCV RBC AUTO: 89 FL (ref 82–98)
MONOCYTES # BLD AUTO: 0.3 K/UL (ref 0.3–1)
MONOCYTES NFR BLD: 8.2 % (ref 4–15)
NEUTROPHILS # BLD AUTO: 1.9 K/UL (ref 1.8–7.7)
NEUTROPHILS NFR BLD: 45.8 % (ref 38–73)
NRBC BLD-RTO: 0 /100 WBC
PLATELET # BLD AUTO: 243 K/UL (ref 150–350)
PMV BLD AUTO: 9.7 FL (ref 9.2–12.9)
POTASSIUM SERPL-SCNC: 3.7 MMOL/L (ref 3.5–5.1)
PROT SERPL-MCNC: 7 G/DL (ref 6–8.4)
RBC # BLD AUTO: 4.22 M/UL (ref 4–5.4)
SODIUM SERPL-SCNC: 138 MMOL/L (ref 136–145)
WBC # BLD AUTO: 4.04 K/UL (ref 3.9–12.7)

## 2019-08-19 PROCEDURE — 36415 COLL VENOUS BLD VENIPUNCTURE: CPT

## 2019-08-19 PROCEDURE — 80053 COMPREHEN METABOLIC PANEL: CPT

## 2019-08-19 PROCEDURE — 85025 COMPLETE CBC W/AUTO DIFF WBC: CPT

## 2019-09-09 DIAGNOSIS — E78.5 HYPERLIPIDEMIA, UNSPECIFIED HYPERLIPIDEMIA TYPE: ICD-10-CM

## 2019-09-09 DIAGNOSIS — I10 HYPERTENSION, UNSPECIFIED TYPE: ICD-10-CM

## 2019-09-09 RX ORDER — ENALAPRIL MALEATE 5 MG/1
TABLET ORAL
Qty: 30 TABLET | Refills: 1 | Status: SHIPPED | OUTPATIENT
Start: 2019-09-09 | End: 2019-11-04 | Stop reason: SDUPTHER

## 2019-09-09 RX ORDER — ISOSORBIDE MONONITRATE 60 MG/1
TABLET, EXTENDED RELEASE ORAL
Qty: 30 TABLET | Refills: 1 | Status: SHIPPED | OUTPATIENT
Start: 2019-09-09 | End: 2019-11-04 | Stop reason: SDUPTHER

## 2019-09-09 RX ORDER — EZETIMIBE 10 MG/1
TABLET ORAL
Qty: 30 TABLET | Refills: 1 | Status: SHIPPED | OUTPATIENT
Start: 2019-09-09 | End: 2019-11-04 | Stop reason: SDUPTHER

## 2019-09-26 ENCOUNTER — PATIENT OUTREACH (OUTPATIENT)
Dept: ADMINISTRATIVE | Facility: HOSPITAL | Age: 54
End: 2019-09-26

## 2019-09-26 NOTE — LETTER
September 26, 2019    Clara Ann Mauri  [address]  [place] [address]             [facility] [address] [address]  Phone: [phone] Dear Clara    [facility] is committed to your overall health and would like to ensure that you are up to date on your recommended test and/or procedures.   Chai Adamson MD  has found that your chart shows you may be due for the following:    Health Maintenance Due   Topic Date Due    Hepatitis C Screening  1965    TETANUS VACCINE  04/25/1982    Pneumococcal Vaccine (Highest Risk) (1 of 3 - PCV13) 04/25/1983    Mammogram  04/25/2004    Shingles Vaccine (1 of 2) 04/25/2014    Colonoscopy  04/25/2014    Influenza Vaccine (1) 09/01/2019     If you have had any of the above done at another facility, please let us know so that we may obtain copies from that facility.  If you have a copy of these records, please provide a copy for us to scan into your chart.  You are welcome to request that the report be faxed to us at  ([phone]).     Otherwise, please schedule these appointments at your earliest convenience by calling [phone] or going to [email or web address].    If you have an upcoming scheduled appointment for the item above, please disregard this letter.    Sincerely,  Your [facility]  MD Mandy José L.P.N. Clinical Care Coordinator  [address] [place] [address], [place] [address] [phone]

## 2019-09-30 ENCOUNTER — OFFICE VISIT (OUTPATIENT)
Dept: HEMATOLOGY/ONCOLOGY | Facility: CLINIC | Age: 54
End: 2019-09-30
Payer: MEDICARE

## 2019-09-30 VITALS
DIASTOLIC BLOOD PRESSURE: 70 MMHG | SYSTOLIC BLOOD PRESSURE: 120 MMHG | WEIGHT: 130.88 LBS | TEMPERATURE: 99 F | RESPIRATION RATE: 19 BRPM | HEART RATE: 69 BPM | BODY MASS INDEX: 22.47 KG/M2

## 2019-09-30 DIAGNOSIS — Z17.1 ESTROGEN RECEPTOR NEGATIVE TUMOR STATUS: ICD-10-CM

## 2019-09-30 DIAGNOSIS — Z87.891 PERSONAL HISTORY OF TOBACCO USE, PRESENTING HAZARDS TO HEALTH: ICD-10-CM

## 2019-09-30 DIAGNOSIS — C50.211 MALIGNANT NEOPLASM OF UPPER-INNER QUADRANT OF RIGHT BREAST IN FEMALE, ESTROGEN RECEPTOR NEGATIVE: Primary | ICD-10-CM

## 2019-09-30 DIAGNOSIS — R13.10 DYSPHAGIA, UNSPECIFIED TYPE: ICD-10-CM

## 2019-09-30 DIAGNOSIS — Z17.1 MALIGNANT NEOPLASM OF UPPER-INNER QUADRANT OF RIGHT BREAST IN FEMALE, ESTROGEN RECEPTOR NEGATIVE: Primary | ICD-10-CM

## 2019-09-30 PROCEDURE — 99213 PR OFFICE/OUTPT VISIT, EST, LEVL III, 20-29 MIN: ICD-10-PCS | Mod: S$GLB,,, | Performed by: INTERNAL MEDICINE

## 2019-09-30 PROCEDURE — 99213 OFFICE O/P EST LOW 20 MIN: CPT | Mod: S$GLB,,, | Performed by: INTERNAL MEDICINE

## 2019-09-30 RX ORDER — TIZANIDINE 4 MG/1
4 TABLET ORAL EVERY 6 HOURS PRN
COMMUNITY
End: 2022-04-29 | Stop reason: ALTCHOICE

## 2019-09-30 NOTE — PROGRESS NOTES
[facility] Hematology/Oncology  PROGRESS NOTE      Subjective:       Patient ID:   NAME: Clara Dotson : 1965     54 y.o. female    Referring Doc: Dave  Other Physicians: Lucila (new [facility]), Juan Diego (PCP-[place]); Kalina (Neuro); Bhaskar    Chief Complaint:  breast ca  f/u    History of Present Illness:     Patient returns today for a regularly scheduled follow-up visit.  The patient is her by herself. She is doing ok.  She denies any current CP, SOB,HA's or N/V. She has chronic SOB and LYNCH and is now on two different inhalers. She is here by herself. She has been has some dysphagia issues.       ROS:   GEN: normal without any fever, night sweats or weight loss  HEENT: normal with no HA's, sore throat, stiff neck, changes in vision  CV: normal with no CP, SOB, PND, LYNCH or orthopnea  PULM: normal with no SOB, cough, hemoptysis, sputum or pleuritic pain  GI: normal with no abdominal pain, nausea, vomiting, constipation, diarrhea, melanotic stools,  BRBPR, or hematemesis; some dysphagia with solids  : normal with no hematuria, dysuria  BREAST: normal with no mass, discharge, pain  SKIN: normal with no rash, erythema, bruising, or swelling    Allergies:  Review of patient's allergies indicates:   Allergen Reactions    Pcn [penicillins] Hives       Medications:    Current Outpatient Medications:     ascorbic acid, vitamin C, (VITAMIN C) 100 MG tablet, Take 100 mg by mouth once daily., Disp: , Rfl:     aspirin 325 MG tablet, Take 325 mg by mouth once daily., Disp: , Rfl:     enalapril (VASOTEC) 5 MG tablet, TAKE ONE TABLET BY MOUTH EVERY DAY **THANK YOU**, Disp: 30 tablet, Rfl: 1    ezetimibe (ZETIA) 10 mg tablet, TAKE ONE TABLET BY MOUTH EVERY DAY **THANK YOU**, Disp: 30 tablet, Rfl: 1    gabapentin (NEURONTIN) 300 MG capsule, Take 1 capsule (300 mg total) by mouth 3 (three) times daily., Disp: 90 capsule, Rfl: 3    hydrocodone-acetaminophen 10-325mg (NORCO)  mg Tab, hydrocodone 10 mg-acetaminophen  325 mg tablet, Disp: , Rfl:     isosorbide mononitrate (IMDUR) 60 MG 24 hr tablet, TAKE ONE TABLET BY MOUTH EVERY DAY **THANK YOU**, Disp: 30 tablet, Rfl: 1    nitroGLYCERIN (NITROSTAT) 0.4 MG SL tablet, Place 1 tablet (0.4 mg total) under the tongue every 5 (five) minutes as needed., Disp: 25 tablet, Rfl: 5    tiZANidine (ZANAFLEX) 4 MG tablet, Take 4 mg by mouth every 6 (six) hours as needed., Disp: , Rfl:     umeclidinium-vilanterol (ANORO ELLIPTA) 62.5-25 mcg/actuation DsDv, Inhale 1 puff into the lungs once daily. Controller, Disp: 1 each, Rfl: 5    vitamin D (VITAMIN D3) 1000 units Tab, Take 1,000 Units by mouth once daily., Disp: , Rfl:     zolpidem (AMBIEN) 10 mg Tab, TAKE ONE TABLET BY MOUTH nightly AS NEEDED **THANK YOU**, Disp: 30 tablet, Rfl: 5    albuterol (PROVENTIL/VENTOLIN HFA) 90 mcg/actuation inhaler, Inhale 2 puffs into the lungs every 6 (six) hours as needed for Wheezing. Rescue, Disp: 18 g, Rfl: 5    Current Facility-Administered Medications:     cyanocobalamin injection 100 mcg, 100 mcg, Intramuscular, Q30 Days, Israel Neely MD, 1,000 mcg at 06/02/18 0945    PMHx/PSHx Updates:  See patient's last visit with me on 1/14/2019  See H&P in Vol #1        Pathology:  See Vol #1          Objective:     Vitals:  Blood pressure 120/70, pulse 69, temperature 98.5 °F (36.9 °C), temperature source Oral, resp. rate 19, weight 59.4 kg (130 lb 14.4 oz).    Physical Examination:   GEN: no apparent distress, comfortable; AAOx3  HEAD: atraumatic and normocephalic  EYES: no pallor, no icterus, PERRLA  ENT: OMM, no pharyngeal erythema, external ears WNL; no nasal discharge; no thrush  NECK: no masses, thyroid normal, trachea midline, no LAD/LN's, supple  CV: RRR with no murmur; normal pulse; normal S1 and S2; no pedal edema  CHEST: Normal respiratory effort; CTAB; normal breath sounds; no wheeze or crackles  ABDOM: nontender and nondistended; soft; normal bowel sounds; no  rebound/guarding  MUSC/Skeletal: ROM normal; no crepitus; joints normal; no deformities or arthropathy  EXTREM: no clubbing, cyanosis, inflammation or swelling  SKIN: no rashes, lesions, ulcers, petechiae or subcutaneous nodules; tattoos  : no keen  NEURO: grossly intact; motor/sensory WNL; AAOx3; no tremors  PSYCH: normal mood, affect and behavior  LYMPH: normal cervical, supraclavicular, axillary and groin LN's  Breast: no changes; bilateral mastectomies          Labs:   8/19/2019  Lab Results   Component Value Date    WBC 4.04 08/19/2019    HGB 12.4 08/19/2019    HCT 37.6 08/19/2019    MCV 89 08/19/2019     08/19/2019          BMP  Lab Results   Component Value Date     08/19/2019    K 3.7 08/19/2019     08/19/2019    CO2 25 08/19/2019    BUN 16 08/19/2019    CREATININE 0.9 08/19/2019    CALCIUM 9.0 08/19/2019    ANIONGAP 6 (L) 08/19/2019    ESTGFRAFRICA >60.0 08/19/2019    EGFRNONAA >60.0 08/19/2019     Lab Results   Component Value Date    ALT 26 08/19/2019    AST 22 08/19/2019    ALKPHOS 79 08/19/2019    BILITOT 0.5 08/19/2019         Radiology/Diagnostic Studies:    PET/CT Fusion 6/4/2018:  IMPRESSION:    1. Negative for recurrent malignancy or metastatic disease.    2. Development of moderate hepatic steatosis.        CXR 5/31/2017 negative    I have reviewed all available lab results and radiology reports.    Assessment/Plan:     (1) 54 y.o. female with diagnosis of right breast cancer  - diagnosed in 2004  - Stage II  - s/p bilateral mastectomies  - followed by Dr Acosta with Gen Surgery  - s/p AC x4 and XRT  - triple negative  - CT scans in 3/2016  - PET on 6/4/2018 - negative for recurrent cancer     (2) hx/of TIA's and PAD  - followed by Dr Whitlock with cardiology and now by Dr Gaytan     (3) COPD and former smoker  - followed by Dr Wolff with pulm in past  - she is on two separate inhalers     (4) Right inferior auricular biopsy in Nov 2013 - atypical single unit junctional  melanocytic hyperplasia with no evidence of malignancy  - Dr Acosta following       (5) Arthritic issues    (6) Skin cancer issues - followed by Dr Manley (Derm)    (7) Lower extremity issues - she is seeing Dr Gilman/René with Neurology at [place] and she had a nerve conduction study and she plans to see ortho in future    (8) Chronic pain syndrome - followed by Pain management - Dr Barajas    (9) new dysphagia and hx/of hepatic steatosis - s/p prior colonoscopy with Dr Muro             1. Malignant neoplasm of upper-inner quadrant of right breast in female, estrogen receptor negative     2. Estrogen receptor negative tumor status     3. Personal history of tobacco use, presenting hazards to health             PLAN:  1. F/u with PCP, card, pulm, dermatology, neuro and Gen Surg  2. Check labs periodically  3. F/U with GI about the dysphagia  3. RTC in 6 months    Fax note to Chai Adamson (PCP), Bijan Acosta [place], Mishra; New/Bhaskar Shah    Discussion:     I have explained all of the above in detail and the patient understands all of the current recommendation(s). I have answered all of their questions to the best of my ability and to their complete satisfaction.   The patient is to continue with the current management plan.            Electronically signed by Israel Neely MD

## 2019-10-11 ENCOUNTER — OFFICE VISIT (OUTPATIENT)
Dept: FAMILY MEDICINE | Facility: CLINIC | Age: 54
End: 2019-10-11
Payer: MEDICARE

## 2019-10-11 VITALS
OXYGEN SATURATION: 96 % | DIASTOLIC BLOOD PRESSURE: 63 MMHG | RESPIRATION RATE: 18 BRPM | WEIGHT: 131.31 LBS | BODY MASS INDEX: 22.42 KG/M2 | HEART RATE: 72 BPM | SYSTOLIC BLOOD PRESSURE: 108 MMHG | HEIGHT: 64 IN

## 2019-10-11 DIAGNOSIS — H91.93 HEARING PROBLEM OF BOTH EARS: ICD-10-CM

## 2019-10-11 DIAGNOSIS — Z11.59 NEED FOR HEPATITIS C SCREENING TEST: ICD-10-CM

## 2019-10-11 DIAGNOSIS — Z12.39 BREAST CANCER SCREENING: ICD-10-CM

## 2019-10-11 DIAGNOSIS — Z23 NEED FOR VACCINATION: ICD-10-CM

## 2019-10-11 DIAGNOSIS — R13.19 ESOPHAGEAL DYSPHAGIA: Primary | ICD-10-CM

## 2019-10-11 PROCEDURE — 99215 OFFICE O/P EST HI 40 MIN: CPT | Mod: PBBFAC,PN,25 | Performed by: FAMILY MEDICINE

## 2019-10-11 PROCEDURE — 99999 PR PBB SHADOW E&M-EST. PATIENT-LVL V: CPT | Mod: PBBFAC,,, | Performed by: FAMILY MEDICINE

## 2019-10-11 PROCEDURE — 99214 OFFICE O/P EST MOD 30 MIN: CPT | Mod: S$PBB,,, | Performed by: FAMILY MEDICINE

## 2019-10-11 PROCEDURE — 90686 IIV4 VACC NO PRSV 0.5 ML IM: CPT | Mod: PBBFAC,PN

## 2019-10-11 PROCEDURE — 99999 PR PBB SHADOW E&M-EST. PATIENT-LVL V: ICD-10-PCS | Mod: PBBFAC,,, | Performed by: FAMILY MEDICINE

## 2019-10-11 PROCEDURE — 99214 PR OFFICE/OUTPT VISIT, EST, LEVL IV, 30-39 MIN: ICD-10-PCS | Mod: S$PBB,,, | Performed by: FAMILY MEDICINE

## 2019-10-11 PROCEDURE — G0009 ADMIN PNEUMOCOCCAL VACCINE: HCPCS | Mod: PBBFAC,PN

## 2019-10-11 NOTE — PROGRESS NOTES
"Subjective:       Patient ID: Clara Dotson is a 54 y.o. female.    Chief Complaint: Follow-up (colonoscopy completed w/Dr. Alejandro Muro)    Having some dysphagia  Worsening  Saw GI in [place], but is not able to cross state lines with her insurance    Also with bilateral hearing loss  S/p melanoma surgery    Review of Systems   Constitutional: Negative for activity change, appetite change, chills, fatigue and fever.   HENT: Negative for congestion, dental problem, facial swelling, nosebleeds, postnasal drip, sinus pain, sore throat, trouble swallowing and voice change.    Eyes: Negative for pain, discharge and visual disturbance.   Respiratory: Negative for apnea, cough, chest tightness and shortness of breath.    Cardiovascular: Negative for chest pain and palpitations.   Gastrointestinal: Negative for abdominal pain, blood in stool, constipation and nausea.   Endocrine: Negative for cold intolerance, polydipsia and polyuria.   Genitourinary: Negative for difficulty urinating, enuresis and flank pain.   Musculoskeletal: Positive for arthralgias, back pain, gait problem and myalgias.   Skin: Negative for color change.   Allergic/Immunologic: Negative for environmental allergies and immunocompromised state.   Neurological: Negative for dizziness and light-headedness.   Hematological: Negative for adenopathy.   Psychiatric/Behavioral: Negative for agitation, behavioral problems, decreased concentration and dysphoric mood. The patient is not nervous/anxious.    All other systems reviewed and are negative.        Reviewed family, medical, surgical, and social history.    Objective:      /63 (BP Location: Left arm, Patient Position: Sitting, BP Method: Medium (Automatic))   Pulse 72   Resp 18   Ht 5' 4" (1.626 m)   Wt 59.6 kg (131 lb 4.8 oz)   SpO2 96%   BMI 22.54 kg/m²   Physical Exam   Constitutional: She is oriented to person, place, and time. She appears well-developed and well-nourished. No " distress.   HENT:   Head: Normocephalic and atraumatic.   Nose: Nose normal.   Mouth/Throat: Oropharynx is clear and moist. No oropharyngeal exudate.   Eyes: Pupils are equal, round, and reactive to light. Conjunctivae and EOM are normal. No scleral icterus.   Neck: Normal range of motion. Neck supple. No thyromegaly present.   Cardiovascular: Normal rate, regular rhythm and normal heart sounds. Exam reveals no gallop and no friction rub.   No murmur heard.  Pulmonary/Chest: Effort normal and breath sounds normal. No respiratory distress. She has no wheezes. She has no rales. She exhibits no tenderness.   Abdominal: Soft. Bowel sounds are normal. She exhibits no distension. There is no tenderness. There is no guarding.   Musculoskeletal: Normal range of motion. She exhibits tenderness and deformity. She exhibits no edema.   Lymphadenopathy:     She has no cervical adenopathy.   Neurological: She is alert and oriented to person, place, and time. She displays normal reflexes. No cranial nerve deficit or sensory deficit. She exhibits normal muscle tone.   Skin: Skin is warm and dry. No rash noted. She is not diaphoretic. No erythema. No pallor.   Psychiatric: She has a normal mood and affect. Her behavior is normal. Judgment and thought content normal.   Nursing note and vitals reviewed.      Assessment:       1. Esophageal dysphagia    2. Need for hepatitis C screening test    3. Breast cancer screening    4. Need for vaccination    5. Hearing problem of both ears        Plan:       Esophageal dysphagia  -     Ambulatory referral to Gastroenterology    Need for hepatitis C screening test  -     Hepatitis C antibody; Future; Expected date: 03/30/2020    Breast cancer screening    Need for vaccination  -     Pneumococcal Conjugate Vaccine (13 Valent) (IM)  -     Influenza - Quadrivalent (PF)    Hearing problem of both ears  -     Ambulatory referral to ENT            Risks, benefits, and side effects were discussed with  the patient. All questions were answered to the fullest satisfaction of the patient, and pt verbalized understanding and agreement to treatment plan. Pt was to call with any new or worsening symptoms, or present to the ER.

## 2019-10-16 ENCOUNTER — OFFICE VISIT (OUTPATIENT)
Dept: GASTROENTEROLOGY | Facility: CLINIC | Age: 54
End: 2019-10-16
Payer: MEDICARE

## 2019-10-16 VITALS
BODY MASS INDEX: 22.36 KG/M2 | SYSTOLIC BLOOD PRESSURE: 123 MMHG | WEIGHT: 131 LBS | HEIGHT: 64 IN | HEART RATE: 60 BPM | OXYGEN SATURATION: 98 % | RESPIRATION RATE: 16 BRPM | DIASTOLIC BLOOD PRESSURE: 69 MMHG

## 2019-10-16 DIAGNOSIS — R13.10 DYSPHAGIA, UNSPECIFIED TYPE: Primary | ICD-10-CM

## 2019-10-16 DIAGNOSIS — K21.9 GASTROESOPHAGEAL REFLUX DISEASE WITHOUT ESOPHAGITIS: ICD-10-CM

## 2019-10-16 PROCEDURE — 99213 OFFICE O/P EST LOW 20 MIN: CPT | Mod: PBBFAC,PN | Performed by: INTERNAL MEDICINE

## 2019-10-16 PROCEDURE — 99203 PR OFFICE/OUTPT VISIT, NEW, LEVL III, 30-44 MIN: ICD-10-PCS | Mod: S$PBB,,, | Performed by: INTERNAL MEDICINE

## 2019-10-16 PROCEDURE — 99999 PR PBB SHADOW E&M-EST. PATIENT-LVL III: CPT | Mod: PBBFAC,,, | Performed by: INTERNAL MEDICINE

## 2019-10-16 PROCEDURE — 99203 OFFICE O/P NEW LOW 30 MIN: CPT | Mod: S$PBB,,, | Performed by: INTERNAL MEDICINE

## 2019-10-16 PROCEDURE — 99999 PR PBB SHADOW E&M-EST. PATIENT-LVL III: ICD-10-PCS | Mod: PBBFAC,,, | Performed by: INTERNAL MEDICINE

## 2019-10-16 NOTE — PATIENT INSTRUCTIONS
She will continue current medications.  She will follow-up with her pulmonologist.  She will chew her food well. A barium swallow and upper GI will be scheduled initially to identify the source of the dysphagia.

## 2019-10-16 NOTE — PROGRESS NOTES
Subjective:       Patient ID: Clara Dotson is a 54 y.o. female.    Chief Complaint: Dysphagia (x 2months)    She states for the last month she has had difficulty swallowing.  She feels as if it will not go down .  Sometimes she has to regurgitate the food.  She denies aspiration hematemesis hematochezia jaundice or bleeding.  She she usually has daily bowel movements.  She has had pyrosis and occasional sore throat.  She had cervical spine surgery approximately 10 years ago but did not have the dysphagia into the last month.      Allergies:  Review of patient's allergies indicates:   Allergen Reactions    Adhesive      Silk tape (white tape)     Pcn [penicillins] Hives       Medications:    Current Outpatient Medications:     ascorbic acid, vitamin C, (VITAMIN C) 100 MG tablet, Take 100 mg by mouth once daily., Disp: , Rfl:     aspirin 325 MG tablet, Take 325 mg by mouth once daily., Disp: , Rfl:     enalapril (VASOTEC) 5 MG tablet, TAKE ONE TABLET BY MOUTH EVERY DAY **THANK YOU**, Disp: 30 tablet, Rfl: 1    ezetimibe (ZETIA) 10 mg tablet, TAKE ONE TABLET BY MOUTH EVERY DAY **THANK YOU**, Disp: 30 tablet, Rfl: 1    gabapentin (NEURONTIN) 300 MG capsule, Take 1 capsule (300 mg total) by mouth 3 (three) times daily., Disp: 90 capsule, Rfl: 3    hydrocodone-acetaminophen 10-325mg (NORCO)  mg Tab, every 6 (six) hours as needed. , Disp: , Rfl:     isosorbide mononitrate (IMDUR) 60 MG 24 hr tablet, TAKE ONE TABLET BY MOUTH EVERY DAY **THANK YOU**, Disp: 30 tablet, Rfl: 1    nitroGLYCERIN (NITROSTAT) 0.4 MG SL tablet, Place 1 tablet (0.4 mg total) under the tongue every 5 (five) minutes as needed., Disp: 25 tablet, Rfl: 5    tiZANidine (ZANAFLEX) 4 MG tablet, Take 4 mg by mouth every 6 (six) hours as needed., Disp: , Rfl:     umeclidinium-vilanterol (ANORO ELLIPTA) 62.5-25 mcg/actuation DsDv, Inhale 1 puff into the lungs once daily. Controller, Disp: 1 each, Rfl: 5    vitamin D (VITAMIN D3) 1000  units Tab, Take 1,000 Units by mouth once daily., Disp: , Rfl:     zolpidem (AMBIEN) 10 mg Tab, TAKE ONE TABLET BY MOUTH nightly AS NEEDED **THANK YOU**, Disp: 30 tablet, Rfl: 5    albuterol (PROVENTIL/VENTOLIN HFA) 90 mcg/actuation inhaler, Inhale 2 puffs into the lungs every 6 (six) hours as needed for Wheezing. Rescue, Disp: 18 g, Rfl: 5    Current Facility-Administered Medications:     cyanocobalamin injection 100 mcg, 100 mcg, Intramuscular, Q30 Days, Israel Neely MD, 1,000 mcg at 06/02/18 0945    Past Medical History:   Diagnosis Date    Anemia     Estrogen receptor negative status (ER-) 5/15/2017    Malignant neoplasm of right breast 5/15/2017    Skin cancer        Past Surgical History:   Procedure Laterality Date    BILATERAL MASTECTOMY      CHOLECYSTECTOMY      colon polyp removal      HYSTERECTOMY      LIPOSUCTION      melanoma surgery      NECK SURGERY      reconstructive breast       tummy kevin           Review of Systems   Constitutional: Negative for appetite change, fever and unexpected weight change.   HENT: Positive for sore throat and trouble swallowing.         No jaundice.   Respiratory: Negative for cough, shortness of breath and wheezing.         She is a former smoker.  She has seen the pulmonologist.  She has some dyspnea with exertion.  She denies aspiration chronic cough or sputum production.    Cardiovascular: Negative for chest pain.        She denies exertional chest pain or Rhythm disturbance.  She states her blood pressure is well controlled.   Gastrointestinal: Negative for abdominal distention, abdominal pain, anal bleeding, blood in stool, constipation, diarrhea and nausea.        Her father and his family had colon cancer breast canc jaw cancer and throat cancer she states that there is a genetic predisposition to cancer.  I cannot find it in the current chart.  She has had pyrosis but denies aspiration.  She has had multiple colonoscopy with multiple  polypectomies.  Her last colonoscopy was 6 months ago.  She had polyps removed.  She she denies hematemesis hematochezia jaundice or bleeding.  She is having daily bowel movements.             Musculoskeletal: Positive for arthralgias and back pain. Negative for neck pain.   Skin: Negative for pallor and rash.   Neurological: Negative for dizziness, seizures, syncope, speech difficulty, weakness and numbness.   Hematological: Negative for adenopathy.   Psychiatric/Behavioral: Negative for confusion.       Objective:      Physical Exam   Constitutional: She is oriented to person, place, and time. She appears well-nourished.   Well-nourished well-hydrated nonicteric white female.  Her hand  is firm.   HENT:   Head: Normocephalic.   Eyes: Pupils are equal, round, and reactive to light. EOM are normal.   Neck: Normal range of motion. Neck supple. No tracheal deviation present. No thyromegaly present.   Healed surgical scar.  She has swallows well with appropriate movement of her larynx.  Masses are not detected.  Bruits are absent.   Cardiovascular: Normal rate, regular rhythm and normal heart sounds.   There is a grade 1 systolic aortic murmur.   Pulmonary/Chest: Effort normal and breath sounds normal.   Abdominal: Soft. Bowel sounds are normal. She exhibits no distension and no mass. There is no tenderness. There is no guarding. No hernia.   Abdomen is soft without tenderness masses organomegaly.  Bowel sounds are normal.   Musculoskeletal: Normal range of motion.   She can ambulate normally.  She can go from the sitting to standing position without difficulty.  She get on the exam table without difficulty.   Lymphadenopathy:     She has no cervical adenopathy.   Neurological: She is alert and oriented to person, place, and time. No cranial nerve deficit.   Skin: Skin is warm and dry.   Psychiatric: She has a normal mood and affect. Her behavior is normal.   Vitals reviewed.        Plan:       Dysphagia,  unspecified type  -     FL Esophagram Complete; Future; Expected date: 10/16/2019  -     FL Upper GI With KUB; Future; Expected date: 10/16/2019    Gastroesophageal reflux disease without esophagitis     She will continue her reflux regimen current medication.  Initially barium swallow scheduled.  She also with a pulmonologist.  She will chew her food very well and she will avoid the foods that are difficult to chew such as the meats until her evaluation is completed.

## 2019-10-16 NOTE — LETTER
October 16, 2019      Chai Adamson MD  [address]  [place] [address]           [facility]  [place] [facility]  [address], [address]  [place] [address]  Phone: [phone]  Fax: [phone]          Patient: Clara Dotson   MR Number: [id number]   YOB: 1965   Date of Visit: 10/16/2019       Dear Dr. Chai Adamson:    Thank you for referring Clara Dotson to me for evaluation. Attached you will find relevant portions of my assessment and plan of care.    If you have questions, please do not hesitate to call me. I look forward to following Clara Dotson along with you.    Sincerely,    Bayron Adamson MD    Enclosure  CC:  No Recipients    If you would like to receive this communication electronically, please contact [email or web address] or [phone] to request more information on [facility] access.    For providers and/or their staff who would like to refer a patient to [facility], please contact us through our one-stop-shop provider referral line, [facility], at [phone].    If you feel you have received this communication in error or would no longer like to receive these types of communications, please e-mail [email or web address]

## 2019-10-21 DIAGNOSIS — K76.89 OTHER SPECIFIED DISEASES OF LIVER: Primary | ICD-10-CM

## 2019-10-29 ENCOUNTER — PATIENT OUTREACH (OUTPATIENT)
Dept: ADMINISTRATIVE | Facility: HOSPITAL | Age: 54
End: 2019-10-29

## 2019-11-04 DIAGNOSIS — I10 HYPERTENSION, UNSPECIFIED TYPE: ICD-10-CM

## 2019-11-04 DIAGNOSIS — E78.5 HYPERLIPIDEMIA, UNSPECIFIED HYPERLIPIDEMIA TYPE: ICD-10-CM

## 2019-11-04 RX ORDER — ENALAPRIL MALEATE 5 MG/1
TABLET ORAL
Qty: 30 TABLET | Refills: 1 | Status: SHIPPED | OUTPATIENT
Start: 2019-11-04 | End: 2020-01-05

## 2019-11-04 RX ORDER — EZETIMIBE 10 MG/1
TABLET ORAL
Qty: 30 TABLET | Refills: 1 | Status: SHIPPED | OUTPATIENT
Start: 2019-11-04 | End: 2020-01-05

## 2019-11-04 RX ORDER — ISOSORBIDE MONONITRATE 60 MG/1
TABLET, EXTENDED RELEASE ORAL
Qty: 30 TABLET | Refills: 1 | Status: SHIPPED | OUTPATIENT
Start: 2019-11-04 | End: 2020-01-05

## 2019-11-04 RX ORDER — NITROGLYCERIN 0.4 MG/1
TABLET SUBLINGUAL
Qty: 25 TABLET | Refills: 5 | Status: SHIPPED | OUTPATIENT
Start: 2019-11-04 | End: 2020-04-29

## 2019-11-04 RX ORDER — GABAPENTIN 300 MG/1
CAPSULE ORAL
Qty: 90 CAPSULE | Refills: 3 | Status: SHIPPED | OUTPATIENT
Start: 2019-11-04 | End: 2020-06-05

## 2019-11-11 ENCOUNTER — HOSPITAL ENCOUNTER (OUTPATIENT)
Dept: RADIOLOGY | Facility: HOSPITAL | Age: 54
Discharge: HOME | End: 2019-11-11
Attending: INTERNAL MEDICINE
Payer: MEDICARE

## 2019-11-11 DIAGNOSIS — K76.89 OTHER SPECIFIED DISEASES OF LIVER: ICD-10-CM

## 2019-11-11 PROCEDURE — 76705 ECHO EXAM OF ABDOMEN: CPT | Mod: 26,,, | Performed by: RADIOLOGY

## 2019-11-11 PROCEDURE — 76705 ECHO EXAM OF ABDOMEN: CPT | Mod: TC

## 2019-11-11 PROCEDURE — 76705 US ABDOMEN LIMITED: ICD-10-PCS | Mod: 26,,, | Performed by: RADIOLOGY

## 2019-11-13 ENCOUNTER — HOSPITAL ENCOUNTER (OUTPATIENT)
Dept: RADIOLOGY | Facility: HOSPITAL | Age: 54
Discharge: HOME | End: 2019-11-13
Attending: INTERNAL MEDICINE
Payer: MEDICARE

## 2019-11-13 DIAGNOSIS — R13.10 DYSPHAGIA, UNSPECIFIED TYPE: ICD-10-CM

## 2019-11-13 PROCEDURE — 74241 FL UPPER GI W KUB TO INCLUDE ESOPHAGRAM: CPT | Mod: TC,FY

## 2019-11-13 PROCEDURE — A9698 NON-RAD CONTRAST MATERIALNOC: HCPCS | Performed by: INTERNAL MEDICINE

## 2019-11-13 PROCEDURE — 25500020 PHARM REV CODE 255: Performed by: INTERNAL MEDICINE

## 2019-11-13 PROCEDURE — 74241 FL UPPER GI W KUB TO INCLUDE ESOPHAGRAM: ICD-10-PCS | Mod: 26,,, | Performed by: RADIOLOGY

## 2019-11-13 PROCEDURE — 74241 FL UPPER GI W KUB TO INCLUDE ESOPHAGRAM: CPT | Mod: 26,,, | Performed by: RADIOLOGY

## 2019-11-13 RX ADMIN — BARIUM SULFATE 140 ML: 980 POWDER, FOR SUSPENSION ORAL at 11:11

## 2019-11-18 ENCOUNTER — OFFICE VISIT (OUTPATIENT)
Dept: GASTROENTEROLOGY | Facility: CLINIC | Age: 54
End: 2019-11-18
Payer: MEDICARE

## 2019-11-18 VITALS
OXYGEN SATURATION: 96 % | DIASTOLIC BLOOD PRESSURE: 82 MMHG | RESPIRATION RATE: 16 BRPM | HEIGHT: 66 IN | WEIGHT: 131 LBS | SYSTOLIC BLOOD PRESSURE: 140 MMHG | BODY MASS INDEX: 21.05 KG/M2 | HEART RATE: 57 BPM

## 2019-11-18 DIAGNOSIS — K21.9 GASTROESOPHAGEAL REFLUX DISEASE WITHOUT ESOPHAGITIS: ICD-10-CM

## 2019-11-18 DIAGNOSIS — R13.10 DYSPHAGIA, UNSPECIFIED TYPE: Primary | ICD-10-CM

## 2019-11-18 DIAGNOSIS — Z11.59 NEED FOR HEPATITIS C SCREENING TEST: ICD-10-CM

## 2019-11-18 DIAGNOSIS — K44.9 HIATAL HERNIA: ICD-10-CM

## 2019-11-18 PROCEDURE — 99214 PR OFFICE/OUTPT VISIT, EST, LEVL IV, 30-39 MIN: ICD-10-PCS | Mod: S$PBB,,, | Performed by: INTERNAL MEDICINE

## 2019-11-18 PROCEDURE — 99213 OFFICE O/P EST LOW 20 MIN: CPT | Mod: PBBFAC,PN | Performed by: INTERNAL MEDICINE

## 2019-11-18 PROCEDURE — 99999 PR PBB SHADOW E&M-EST. PATIENT-LVL III: ICD-10-PCS | Mod: PBBFAC,,, | Performed by: INTERNAL MEDICINE

## 2019-11-18 PROCEDURE — 99214 OFFICE O/P EST MOD 30 MIN: CPT | Mod: S$PBB,,, | Performed by: INTERNAL MEDICINE

## 2019-11-18 PROCEDURE — 99999 PR PBB SHADOW E&M-EST. PATIENT-LVL III: CPT | Mod: PBBFAC,,, | Performed by: INTERNAL MEDICINE

## 2019-11-18 RX ORDER — ALBUTEROL SULFATE 90 UG/1
AEROSOL, METERED RESPIRATORY (INHALATION)
Refills: 6 | COMMUNITY
Start: 2019-11-07

## 2019-11-18 NOTE — LETTER
November 18, 2019      Chai Adamson MD  [address]  [place] [address]           [facility]  [place] [facility]  [address]  [place] [address]  Phone: [phone]  Fax: [phone]          Patient: Clara Dotson   MR Number: [id number]   YOB: 1965   Date of Visit: 11/18/2019       Dear Dr. Chai Adamson:    Thank you for referring Clara oDtson to me for evaluation. Attached you will find relevant portions of my assessment and plan of care.    If you have questions, please do not hesitate to call me. I look forward to following Clara Dotson along with you.    Sincerely,    Bayron Adamson MD    Enclosure  CC:  No Recipients    If you would like to receive this communication electronically, please contact [email or web address] or [phone] to request more information on [email or web address] access.    For providers and/or their staff who would like to refer a patient to [facility], please contact us through our one-stop-shop provider referral line, [facility] , at [phone].    If you feel you have received this communication in error or would no longer like to receive these types of communications, please e-mail [email or web address]

## 2019-11-18 NOTE — PROGRESS NOTES
Subjective:       Patient ID: Clara Dotson is a 54 y.o. female.    Chief Complaint: Follow-up    The ultrasound reveals a fatty liver.  She has had a cholecystectomy.  The barium swallow reveals a hiatal hernia. She still complains of dysphagia particularly with some solid foods and big pills .  She denies acute obstruction or aspiration.  She denies hematemesis hematochezia jaundice or bleeding.  She has had occasional pyrosis.  She does not associate her symptoms with her previous cervical spine surgery.  She does not associate her symptoms with her medications.  Multiple ones which would alter her gastrointestinal function and esophageal peristalsis.  She denies fever chills or weight loss.      Allergies:  Review of patient's allergies indicates:   Allergen Reactions    Adhesive      Silk tape (white tape)     Pcn [penicillins] Hives       Medications:    Current Outpatient Medications:     ascorbic acid, vitamin C, (VITAMIN C) 100 MG tablet, Take 100 mg by mouth once daily., Disp: , Rfl:     aspirin 325 MG tablet, Take 325 mg by mouth once daily., Disp: , Rfl:     enalapril (VASOTEC) 5 MG tablet, TAKE ONE TABLET BY MOUTH EVERY DAY **THANK YOU**, Disp: 30 tablet, Rfl: 1    ezetimibe (ZETIA) 10 mg tablet, TAKE ONE TABLET BY MOUTH EVERY DAY **THANK YOU**, Disp: 30 tablet, Rfl: 1    gabapentin (NEURONTIN) 300 MG capsule, TAKE ONE CAPSULE BY MOUTH THREE TIMES DAILY **THANK YOU**, Disp: 90 capsule, Rfl: 3    hydrocodone-acetaminophen 10-325mg (NORCO)  mg Tab, every 6 (six) hours as needed. , Disp: , Rfl:     isosorbide mononitrate (IMDUR) 60 MG 24 hr tablet, TAKE ONE TABLET BY MOUTH EVERY DAY **THANK YOU**, Disp: 30 tablet, Rfl: 1    nitroGLYCERIN (NITROSTAT) 0.4 MG SL tablet, DISSOLVE 1 TABLET UNDER THE TONGUE FOR CHEST PAIN MAY REPEAT EVERY FIVE MINUTES FOR NO MORE THAN 3 DOSES **THANK YOU**, Disp: 25 tablet, Rfl: 5    tiZANidine (ZANAFLEX) 4 MG tablet, Take 4 mg by mouth every 6 (six)  hours as needed., Disp: , Rfl:     umeclidinium-vilanterol (ANORO ELLIPTA) 62.5-25 mcg/actuation DsDv, Inhale 1 puff into the lungs once daily. Controller, Disp: 1 each, Rfl: 5    VENTOLIN HFA 90 mcg/actuation inhaler, inhale TWO puffs into lungs EVERY 6 HOURS AS NEEDED FOR WHEEZING THANK YOU, Disp: , Rfl: 6    vitamin D (VITAMIN D3) 1000 units Tab, Take 1,000 Units by mouth once daily., Disp: , Rfl:     zolpidem (AMBIEN) 10 mg Tab, TAKE ONE TABLET BY MOUTH nightly AS NEEDED **THANK YOU**, Disp: 30 tablet, Rfl: 5    Current Facility-Administered Medications:     cyanocobalamin injection 100 mcg, 100 mcg, Intramuscular, Q30 Days, Israel Neely MD, 1,000 mcg at 06/02/18 0945    Past Medical History:   Diagnosis Date    Anemia     Estrogen receptor negative status (ER-) 5/15/2017    Malignant neoplasm of right breast 5/15/2017    Skin cancer        Past Surgical History:   Procedure Laterality Date    BILATERAL MASTECTOMY      CHOLECYSTECTOMY      colon polyp removal      HYSTERECTOMY      LIPOSUCTION      melanoma surgery      NECK SURGERY      reconstructive breast       tummy tuck           Review of Systems   Constitutional: Negative for appetite change, fever and unexpected weight change.   HENT: Negative for trouble swallowing.         No jaundice.   Respiratory: Negative for cough, shortness of breath and wheezing.         She is a former cigarette smoker.  She denies dysphagia aspiration exertional dyspnea or hemoptysis.  The pulmonary inhaler has helped her when she has had symptoms.   Cardiovascular: Negative for chest pain.        She denies exertional chest pain.  She states her hypertension and hyperlipidemia well controlled.  She denies rhythm disturbance.   Gastrointestinal: Positive for rectal pain.        She has had occasional nausea without vomiting. She cannot pinpoint a precipitating factor for her GI symptoms.  She cannot pinpoint a specific food gastrointestinal function  or musculoskeletal activit that influence is her her dysphagia and pyrosis.   Musculoskeletal: Positive for arthralgias. Negative for back pain and neck pain.   Skin: Negative for pallor and rash.   Neurological: Negative for dizziness, seizures, syncope, speech difficulty, weakness and numbness.   Hematological: Negative for adenopathy.   Psychiatric/Behavioral: Negative for confusion.       Objective:      Physical Exam   Constitutional: She is oriented to person, place, and time. She appears well-developed and well-nourished.   Well-nourished well-hydrated nonicteric white female   HENT:   Head: Normocephalic.   Eyes: Pupils are equal, round, and reactive to light. EOM are normal.   Neck: Normal range of motion. Neck supple. No tracheal deviation present. No thyromegaly present.   Surgical scar   Cardiovascular: Normal rate, regular rhythm and normal heart sounds.   Pulmonary/Chest: Effort normal and breath sounds normal.   Abdominal: Soft. Bowel sounds are normal. She exhibits no distension and no mass. There is no tenderness. There is no rebound and no guarding. No hernia.   Soft without tenderness masses organomegaly.  Bowel sounds are normal.   Musculoskeletal: Normal range of motion.   She can ambulate normally.  She can go from the sitting to the standing position without difficulty.   Lymphadenopathy:     She has no cervical adenopathy.   Neurological: She is alert and oriented to person, place, and time. No cranial nerve deficit.   Skin: Skin is warm and dry.   Psychiatric: She has a normal mood and affect. Her behavior is normal.   Vitals reviewed.        Plan:       Dysphagia, unspecified type  -     Manometry esophageal; Future    Gastroesophageal reflux disease without esophagitis    Hiatal hernia     She will continue her reflux regimen current medications.  She continues her bowel program to avoid constipation. She will start vitamin E 400 units daily.  She will be scheduled for esophageal motility  and in addition for upper endoscopy.  She has good health with her C.  She understands the procedures of upper endoscopy.  Specifically she realizes there is an extremely small incidence of bleeding perforation or aspiration.  She will make a food diary and attempt to identify the off ending foods and precipitating factors influencing her esophageal symptoms.

## 2019-11-18 NOTE — PATIENT INSTRUCTIONS
The ultrasound reveals that she had a cholecystectomy.  Additionally she has a fatty liver.  She will start on vitamin E 400 units daily.  Weight reduction would be ideal.  A Mediterranean diet would be ideal.  She will avoid the fatty foods.  The barium swallow revealed a hiatal hernia.  Esophageal motility and upper endoscopy will be scheduled.

## 2019-11-19 DIAGNOSIS — K21.9 GASTROESOPHAGEAL REFLUX DISEASE WITHOUT ESOPHAGITIS: ICD-10-CM

## 2019-11-19 DIAGNOSIS — K21.9 GERD (GASTROESOPHAGEAL REFLUX DISEASE): ICD-10-CM

## 2019-11-19 DIAGNOSIS — R13.10 DYSPHAGIA, UNSPECIFIED TYPE: Primary | ICD-10-CM

## 2019-12-09 ENCOUNTER — TELEPHONE (OUTPATIENT)
Dept: GASTROENTEROLOGY | Facility: CLINIC | Age: 54
End: 2019-12-09

## 2019-12-09 NOTE — TELEPHONE ENCOUNTER
----- Message from Jeanna Nicholas sent at 12/9/2019 11:55 AM CST -----  Type: Needs to reschedule procedure    Who Called:  Patient  Best Call Back Number: [phone]  Additional Information: Patient needs to reschedule procedure on December 17th/please call back to reschedule or advise.

## 2019-12-10 ENCOUNTER — OFFICE VISIT (OUTPATIENT)
Dept: PULMONOLOGY | Facility: CLINIC | Age: 54
End: 2019-12-10
Payer: MEDICARE

## 2019-12-10 VITALS
BODY MASS INDEX: 21.38 KG/M2 | HEIGHT: 66 IN | SYSTOLIC BLOOD PRESSURE: 120 MMHG | HEART RATE: 65 BPM | DIASTOLIC BLOOD PRESSURE: 74 MMHG | WEIGHT: 133 LBS | OXYGEN SATURATION: 98 %

## 2019-12-10 DIAGNOSIS — Z87.891 PERSONAL HISTORY OF TOBACCO USE, PRESENTING HAZARDS TO HEALTH: ICD-10-CM

## 2019-12-10 DIAGNOSIS — J44.9 CHRONIC OBSTRUCTIVE PULMONARY DISEASE, UNSPECIFIED COPD TYPE: Primary | ICD-10-CM

## 2019-12-10 DIAGNOSIS — J43.1 PANLOBULAR EMPHYSEMA: ICD-10-CM

## 2019-12-10 PROCEDURE — 99214 PR OFFICE/OUTPT VISIT, EST, LEVL IV, 30-39 MIN: ICD-10-PCS | Mod: S$GLB,,, | Performed by: INTERNAL MEDICINE

## 2019-12-10 PROCEDURE — 99214 OFFICE O/P EST MOD 30 MIN: CPT | Mod: S$GLB,,, | Performed by: INTERNAL MEDICINE

## 2019-12-10 RX ORDER — PNV NO.95/FERROUS FUM/FOLIC AC 28MG-0.8MG
1000 TABLET ORAL DAILY
COMMUNITY
End: 2022-04-29 | Stop reason: ALTCHOICE

## 2019-12-10 RX ORDER — TIOTROPIUM BROMIDE 18 UG/1
18 CAPSULE ORAL; RESPIRATORY (INHALATION) DAILY
Qty: 30 CAPSULE | Refills: 5 | Status: SHIPPED | OUTPATIENT
Start: 2019-12-10 | End: 2022-04-29

## 2019-12-10 NOTE — PROGRESS NOTES
"Office Visit    Patient Name: Clara Dotson  MRN: [id number]  : 1965      Reason for visit: COPD    HPI:     10/2/2018 - Referred by Dr Neely for evaluation for possible COPD.  She states that she had PFT "years ago".  Has trouble with SOB, LYNCH.  Had PFT done at [facility] a few months ago.  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  She has been diagnosed with "fatty liver" and is to see GI MD.  Former smoker quit , smoked for about 15 years about 1 PPD.  Worked at [employer] ([id number]) did have exposure tyo chemicals and asbestos (gaskets).    12/10/2019 - Here for follow up about 1 year late, we never got PFT from [facility].  Didn't feel that ANORO helped, does feel that rescue med helps.  No new issues reported.  Has been diagnosed with a hiatal hernia.    Past Medical History    Past Medical History:   Diagnosis Date    Anemia     Estrogen receptor negative status (ER-) 5/15/2017    Malignant neoplasm of right breast 5/15/2017    Skin cancer        Past Surgical History    Past Surgical History:   Procedure Laterality Date    BILATERAL MASTECTOMY      CHOLECYSTECTOMY      colon polyp removal      HYSTERECTOMY      LIPOSUCTION      melanoma surgery      NECK SURGERY      reconstructive breast       tummy [facility]         Medications      Current Outpatient Medications:     ascorbic acid, vitamin C, (VITAMIN C) 100 MG tablet, Take 100 mg by mouth once daily., Disp: , Rfl:     aspirin 325 MG tablet, Take 325 mg by mouth once daily., Disp: , Rfl:     enalapril (VASOTEC) 5 MG tablet, TAKE ONE TABLET BY MOUTH EVERY DAY **THANK YOU**, Disp: 30 tablet, Rfl: 1    ezetimibe (ZETIA) 10 mg tablet, TAKE ONE TABLET BY MOUTH EVERY DAY **THANK YOU**, Disp: 30 tablet, Rfl: 1    gabapentin (NEURONTIN) 300 MG capsule, TAKE ONE CAPSULE BY MOUTH THREE TIMES DAILY **THANK YOU**, Disp: 90 capsule, Rfl: 3    hydrocodone-acetaminophen 10-325mg (NORCO)  mg Tab, every 6 (six) " hours as needed. , Disp: , Rfl:     isosorbide mononitrate (IMDUR) 60 MG 24 hr tablet, TAKE ONE TABLET BY MOUTH EVERY DAY **THANK YOU**, Disp: 30 tablet, Rfl: 1    nitroGLYCERIN (NITROSTAT) 0.4 MG SL tablet, DISSOLVE 1 TABLET UNDER THE TONGUE FOR CHEST PAIN MAY REPEAT EVERY FIVE MINUTES FOR NO MORE THAN 3 DOSES **THANK YOU**, Disp: 25 tablet, Rfl: 5    tiZANidine (ZANAFLEX) 4 MG tablet, Take 4 mg by mouth every 6 (six) hours as needed., Disp: , Rfl:     VENTOLIN HFA 90 mcg/actuation inhaler, inhale TWO puffs into lungs EVERY 6 HOURS AS NEEDED FOR WHEEZING THANK YOU, Disp: , Rfl: 6    vitamin D (VITAMIN D3) 1000 units Tab, Take 1,000 Units by mouth once daily., Disp: , Rfl:     vitamin E 1000 UNIT capsule, Take 1,000 Units by mouth once daily., Disp: , Rfl:     umeclidinium-vilanterol (ANORO ELLIPTA) 62.5-25 mcg/actuation DsDv, Inhale 1 puff into the lungs once daily. Controller (Patient not taking: Reported on 12/10/2019), Disp: 1 each, Rfl: 5    Current Facility-Administered Medications:     cyanocobalamin injection 100 mcg, 100 mcg, Intramuscular, Q30 Days, Israel Neely MD, 1,000 mcg at 18 0945    Allergies    Review of patient's allergies indicates:   Allergen Reactions    Pcn [penicillins] Hives       SocHx    Social History     Tobacco Use   Smoking Status Former Smoker    Last attempt to quit:     Years since quittin.9   Smokeless Tobacco Never Used       Social History     Substance and Sexual Activity   Alcohol Use No       Drug Use - no  Occupation - retired, as above  Asbestos exposure - possible  Pets - dog    FMHx    Family History   Problem Relation Age of Onset    Cancer Mother     Hypertension Mother     Diabetes Mother     Cancer Father     Hypertension Father          Review of Systems  Review of Systems   Constitutional: Negative for chills, diaphoresis, fever, malaise/fatigue and weight loss.   HENT: Positive for hearing loss. Negative for congestion, ear  "discharge, ear pain, nosebleeds, sinus pain, sore throat and tinnitus.    Eyes: Positive for blurred vision. Negative for double vision, photophobia, pain, discharge and redness.   Respiratory: Positive for shortness of breath and wheezing. Negative for cough, hemoptysis, sputum production and stridor.    Cardiovascular: Negative for chest pain, palpitations, orthopnea, claudication, leg swelling and PND.        Had recent heart evaluation which was negative   Gastrointestinal: Positive for heartburn. Negative for abdominal pain, blood in stool, constipation, diarrhea, melena, nausea and vomiting.        Polyps   Genitourinary: Negative for dysuria, flank pain, frequency, hematuria and urgency.   Musculoskeletal: Negative for back pain, falls, joint pain, myalgias and neck pain.   Skin: Negative for itching and rash.   Neurological: Negative for dizziness, tingling, tremors, sensory change, speech change, focal weakness, seizures, loss of consciousness, weakness and headaches.        H/o TIA  X 2   Endo/Heme/Allergies: Negative for environmental allergies and polydipsia. Does not bruise/bleed easily.   Psychiatric/Behavioral: Negative for depression, hallucinations, memory loss, substance abuse and suicidal ideas. The patient is not nervous/anxious and does not have insomnia.        Physical Exam    Vitals:    12/10/19 1030   BP: 120/74   Pulse: 65   SpO2: 98%   Weight: 60.3 kg (133 lb)   Height: 5' 6" (1.676 m)       Physical Exam   Constitutional: She is oriented to person, place, and time. She appears well-developed and well-nourished. No distress.   HENT:   Head: Normocephalic and atraumatic.   Right Ear: External ear normal.   Left Ear: External ear normal.   Nose: Nose normal.   Mouth/Throat: Oropharynx is clear and moist.   Dentures upper lower   Eyes: Pupils are equal, round, and reactive to light. Conjunctivae and EOM are normal.   Neck: Normal range of motion. Neck supple. No JVD present. No tracheal " deviation present. No thyromegaly present.   Cardiovascular: Normal rate, regular rhythm, normal heart sounds and intact distal pulses. Exam reveals no gallop and no friction rub.   No murmur heard.  Pulmonary/Chest: Effort normal and breath sounds normal. No stridor. No respiratory distress. She has no wheezes. She has no rales. She exhibits no tenderness.   Abdominal: Soft. Bowel sounds are normal. She exhibits no distension. There is no tenderness.   Musculoskeletal: Normal range of motion. She exhibits no edema or tenderness.   Lymphadenopathy:     She has no cervical adenopathy.   Neurological: She is alert and oriented to person, place, and time. No cranial nerve deficit.   Skin: Skin is warm and dry. She is not diaphoretic.   Psychiatric: She has a normal mood and affect. Her behavior is normal.   Nursing note and vitals reviewed.      Labs    Lab Results   Component Value Date    WBC 4.04 08/19/2019    HGB 12.4 08/19/2019    HCT 37.6 08/19/2019     08/19/2019       Sodium   Date Value Ref Range Status   08/19/2019 138 136 - 145 mmol/L Final     Potassium   Date Value Ref Range Status   08/19/2019 3.7 3.5 - 5.1 mmol/L Final     Chloride   Date Value Ref Range Status   08/19/2019 107 95 - 110 mmol/L Final     CO2   Date Value Ref Range Status   08/19/2019 25 23 - 29 mmol/L Final     Glucose   Date Value Ref Range Status   08/19/2019 78 70 - 110 mg/dL Final     BUN, Bld   Date Value Ref Range Status   08/19/2019 16 6 - 20 mg/dL Final     Creatinine   Date Value Ref Range Status   08/19/2019 0.9 0.5 - 1.4 mg/dL Final     Calcium   Date Value Ref Range Status   08/19/2019 9.0 8.7 - 10.5 mg/dL Final     Total Protein   Date Value Ref Range Status   08/19/2019 7.0 6.0 - 8.4 g/dL Final     Albumin   Date Value Ref Range Status   08/19/2019 4.4 3.5 - 5.2 g/dL Final     Total Bilirubin   Date Value Ref Range Status   08/19/2019 0.5 0.1 - 1.0 mg/dL Final     Comment:     For infants and newborns, interpretation  of results should be based  on gestational age, weight and in agreement with clinical  observations.  Premature Infant recommended reference ranges:  Up to 24 hours.............<8.0 mg/dL  Up to 48 hours............<12.0 mg/dL  3-5 days..................<15.0 mg/dL  6-29 days.................<15.0 mg/dL       Alkaline Phosphatase   Date Value Ref Range Status   08/19/2019 79 55 - 135 U/L Final     AST   Date Value Ref Range Status   08/19/2019 22 10 - 40 U/L Final     ALT   Date Value Ref Range Status   08/19/2019 26 10 - 44 U/L Final     Anion Gap   Date Value Ref Range Status   08/19/2019 6 (L) 8 - 16 mmol/L Final       Xrays        Impression/Plan    Problem List Items Addressed This Visit        Pulmonary    COPD (chronic obstructive pulmonary disease)  - will order PFT  - trial of spiriva  - prn beta agonist  - RTC 2 months    H/o cigarette use  - quit 2014            Victor Hugo Shah MD

## 2020-01-04 DIAGNOSIS — I10 HYPERTENSION, UNSPECIFIED TYPE: ICD-10-CM

## 2020-01-04 DIAGNOSIS — E78.5 HYPERLIPIDEMIA, UNSPECIFIED HYPERLIPIDEMIA TYPE: ICD-10-CM

## 2020-01-05 RX ORDER — ENALAPRIL MALEATE 5 MG/1
TABLET ORAL
Qty: 30 TABLET | Refills: 1 | Status: SHIPPED | OUTPATIENT
Start: 2020-01-05 | End: 2020-03-05

## 2020-01-05 RX ORDER — EZETIMIBE 10 MG/1
TABLET ORAL
Qty: 30 TABLET | Refills: 1 | Status: SHIPPED | OUTPATIENT
Start: 2020-01-05 | End: 2020-03-05

## 2020-01-05 RX ORDER — ISOSORBIDE MONONITRATE 60 MG/1
TABLET, EXTENDED RELEASE ORAL
Qty: 30 TABLET | Refills: 1 | Status: SHIPPED | OUTPATIENT
Start: 2020-01-05 | End: 2020-03-05

## 2020-01-10 ENCOUNTER — HOSPITAL ENCOUNTER (OUTPATIENT)
Dept: PULMONOLOGY | Facility: HOSPITAL | Age: 55
Discharge: HOME | End: 2020-01-10
Attending: INTERNAL MEDICINE
Payer: MEDICARE

## 2020-01-10 DIAGNOSIS — J44.9 CHRONIC OBSTRUCTIVE PULMONARY DISEASE, UNSPECIFIED COPD TYPE: ICD-10-CM

## 2020-01-10 PROCEDURE — 94729 DIFFUSING CAPACITY: CPT

## 2020-01-10 PROCEDURE — 94010 BREATHING CAPACITY TEST: CPT

## 2020-01-10 PROCEDURE — 94727 GAS DIL/WSHOT DETER LNG VOL: CPT

## 2020-01-14 ENCOUNTER — ANESTHESIA (OUTPATIENT)
Dept: SURGERY | Facility: HOSPITAL | Age: 55
End: 2020-01-14
Payer: MEDICARE

## 2020-01-14 ENCOUNTER — ANESTHESIA EVENT (OUTPATIENT)
Dept: SURGERY | Facility: HOSPITAL | Age: 55
End: 2020-01-14
Payer: MEDICARE

## 2020-01-14 ENCOUNTER — HOSPITAL ENCOUNTER (OUTPATIENT)
Facility: HOSPITAL | Age: 55
Discharge: HOME | End: 2020-01-14
Attending: INTERNAL MEDICINE | Admitting: INTERNAL MEDICINE
Payer: MEDICARE

## 2020-01-14 VITALS
DIASTOLIC BLOOD PRESSURE: 82 MMHG | BODY MASS INDEX: 22.2 KG/M2 | HEART RATE: 87 BPM | HEIGHT: 64 IN | SYSTOLIC BLOOD PRESSURE: 125 MMHG | RESPIRATION RATE: 14 BRPM | OXYGEN SATURATION: 98 % | WEIGHT: 130 LBS | TEMPERATURE: 98 F

## 2020-01-14 DIAGNOSIS — Z01.818 PRE-OP EVALUATION: ICD-10-CM

## 2020-01-14 PROCEDURE — 88305 TISSUE EXAM BY PATHOLOGIST: CPT | Mod: 26,,, | Performed by: PATHOLOGY

## 2020-01-14 PROCEDURE — 63600175 PHARM REV CODE 636 W HCPCS: Performed by: ANESTHESIOLOGY

## 2020-01-14 PROCEDURE — 00731 ANES UPR GI NDSC PX NOS: CPT | Performed by: INTERNAL MEDICINE

## 2020-01-14 PROCEDURE — 25000003 PHARM REV CODE 250: Performed by: NURSE ANESTHETIST, CERTIFIED REGISTERED

## 2020-01-14 PROCEDURE — D9220A PRA ANESTHESIA: ICD-10-PCS | Mod: CRNA,,, | Performed by: NURSE ANESTHETIST, CERTIFIED REGISTERED

## 2020-01-14 PROCEDURE — 27201012 HC FORCEPS, HOT/COLD, DISP: Performed by: INTERNAL MEDICINE

## 2020-01-14 PROCEDURE — D9220A PRA ANESTHESIA: Mod: ANES,,, | Performed by: ANESTHESIOLOGY

## 2020-01-14 PROCEDURE — 37000008 HC ANESTHESIA 1ST 15 MINUTES: Performed by: INTERNAL MEDICINE

## 2020-01-14 PROCEDURE — 43239 PR EGD, FLEX, W/BIOPSY, SGL/MULTI: ICD-10-PCS | Mod: ,,, | Performed by: INTERNAL MEDICINE

## 2020-01-14 PROCEDURE — 88305 TISSUE EXAM BY PATHOLOGIST: CPT | Performed by: PATHOLOGY

## 2020-01-14 PROCEDURE — S0028 INJECTION, FAMOTIDINE, 20 MG: HCPCS | Performed by: ANESTHESIOLOGY

## 2020-01-14 PROCEDURE — 37000009 HC ANESTHESIA EA ADD 15 MINS: Performed by: INTERNAL MEDICINE

## 2020-01-14 PROCEDURE — 43239 EGD BIOPSY SINGLE/MULTIPLE: CPT | Performed by: INTERNAL MEDICINE

## 2020-01-14 PROCEDURE — D9220A PRA ANESTHESIA: Mod: CRNA,,, | Performed by: NURSE ANESTHETIST, CERTIFIED REGISTERED

## 2020-01-14 PROCEDURE — 25000003 PHARM REV CODE 250: Performed by: ANESTHESIOLOGY

## 2020-01-14 PROCEDURE — D9220A PRA ANESTHESIA: ICD-10-PCS | Mod: ANES,,, | Performed by: ANESTHESIOLOGY

## 2020-01-14 PROCEDURE — 93005 ELECTROCARDIOGRAM TRACING: CPT | Mod: 59

## 2020-01-14 PROCEDURE — 43239 EGD BIOPSY SINGLE/MULTIPLE: CPT | Mod: ,,, | Performed by: INTERNAL MEDICINE

## 2020-01-14 PROCEDURE — 63600175 PHARM REV CODE 636 W HCPCS: Performed by: NURSE ANESTHETIST, CERTIFIED REGISTERED

## 2020-01-14 PROCEDURE — 88305 TISSUE EXAM BY PATHOLOGIST: ICD-10-PCS | Mod: 26,,, | Performed by: PATHOLOGY

## 2020-01-14 RX ORDER — SODIUM CHLORIDE, SODIUM LACTATE, POTASSIUM CHLORIDE, CALCIUM CHLORIDE 600; 310; 30; 20 MG/100ML; MG/100ML; MG/100ML; MG/100ML
INJECTION, SOLUTION INTRAVENOUS CONTINUOUS
Status: DISCONTINUED | OUTPATIENT
Start: 2020-01-14 | End: 2020-01-14 | Stop reason: HOSPADM

## 2020-01-14 RX ORDER — PROPOFOL 10 MG/ML
VIAL (ML) INTRAVENOUS
Status: DISCONTINUED | OUTPATIENT
Start: 2020-01-14 | End: 2020-01-14

## 2020-01-14 RX ORDER — LIDOCAINE HYDROCHLORIDE 10 MG/ML
INJECTION INFILTRATION; PERINEURAL
Status: DISCONTINUED | OUTPATIENT
Start: 2020-01-14 | End: 2020-01-14

## 2020-01-14 RX ORDER — SODIUM CHLORIDE, SODIUM LACTATE, POTASSIUM CHLORIDE, CALCIUM CHLORIDE 600; 310; 30; 20 MG/100ML; MG/100ML; MG/100ML; MG/100ML
125 INJECTION, SOLUTION INTRAVENOUS CONTINUOUS
Status: DISCONTINUED | OUTPATIENT
Start: 2020-01-14 | End: 2020-01-14 | Stop reason: HOSPADM

## 2020-01-14 RX ORDER — FAMOTIDINE 10 MG/ML
20 INJECTION INTRAVENOUS ONCE
Status: COMPLETED | OUTPATIENT
Start: 2020-01-14 | End: 2020-01-14

## 2020-01-14 RX ORDER — DIPHENHYDRAMINE HYDROCHLORIDE 50 MG/ML
12.5 INJECTION INTRAMUSCULAR; INTRAVENOUS
Status: DISCONTINUED | OUTPATIENT
Start: 2020-01-14 | End: 2020-01-14 | Stop reason: HOSPADM

## 2020-01-14 RX ORDER — LIDOCAINE HYDROCHLORIDE 10 MG/ML
1 INJECTION, SOLUTION EPIDURAL; INFILTRATION; INTRACAUDAL; PERINEURAL ONCE
Status: DISCONTINUED | OUTPATIENT
Start: 2020-01-14 | End: 2020-01-14 | Stop reason: HOSPADM

## 2020-01-14 RX ORDER — ONDANSETRON 2 MG/ML
4 INJECTION INTRAMUSCULAR; INTRAVENOUS DAILY PRN
Status: DISCONTINUED | OUTPATIENT
Start: 2020-01-14 | End: 2020-01-14 | Stop reason: HOSPADM

## 2020-01-14 RX ORDER — GLYCOPYRROLATE 0.2 MG/ML
INJECTION INTRAMUSCULAR; INTRAVENOUS
Status: DISCONTINUED | OUTPATIENT
Start: 2020-01-14 | End: 2020-01-14

## 2020-01-14 RX ADMIN — PROPOFOL 50 MG: 10 INJECTION, EMULSION INTRAVENOUS at 09:01

## 2020-01-14 RX ADMIN — FAMOTIDINE 20 MG: 10 INJECTION INTRAVENOUS at 08:01

## 2020-01-14 RX ADMIN — SODIUM CHLORIDE, POTASSIUM CHLORIDE, SODIUM LACTATE AND CALCIUM CHLORIDE: 600; 310; 30; 20 INJECTION, SOLUTION INTRAVENOUS at 08:01

## 2020-01-14 RX ADMIN — GLYCOPYRROLATE 0.4 MG: 0.2 INJECTION INTRAMUSCULAR; INTRAVENOUS at 09:01

## 2020-01-14 RX ADMIN — LIDOCAINE HYDROCHLORIDE 50 MG: 10 INJECTION, SOLUTION INFILTRATION; PERINEURAL at 09:01

## 2020-01-14 NOTE — ANESTHESIA POSTPROCEDURE EVALUATION
Anesthesia Post Evaluation    Patient: Clara Dotson    Procedure(s) Performed: Procedure(s) (LRB):  EGD (ESOPHAGOGASTRODUODENOSCOPY) (N/A)    Final Anesthesia Type: general    Patient location during evaluation: PACU  Patient participation: Yes- Able to Participate  Level of consciousness: awake and alert  Post-procedure vital signs: reviewed and stable  Pain management: adequate  Airway patency: patent    PONV status at discharge: No PONV  Anesthetic complications: no      Cardiovascular status: blood pressure returned to baseline  Respiratory status: unassisted  Hydration status: euvolemic  Follow-up not needed.          Vitals Value Taken Time   /92 1/14/2020 10:02 AM   Temp 36.5 °C (97.7 °F) 1/14/2020  8:28 AM   Pulse 86 1/14/2020 10:06 AM   Resp 10 1/14/2020 10:06 AM   SpO2 100 % 1/14/2020 10:06 AM   Vitals shown include unvalidated device data.      Event Time     Out of Recovery 10:00:00          Pain/Dixon Score: Dixon Score: 10 (1/14/2020  9:55 AM)

## 2020-01-14 NOTE — H&P
"Office Visit     11/18/2019  [facility]      Bayron Adamson MD   Gastroenterology   Dysphagia, unspecified type +3 more   Dx   Follow-up ; Referred by Chai Adamson MD   Reason for Visit    Additional Documentation     Vitals:    /82  (BP Location: Left arm, Patient Position: Sitting, BP Method: Medium (Automatic))    Pulse 57     Resp 16    Ht 5' 6" (1.676 m)    Wt 59.4 kg (131 lb)    SpO2 96%    BMI 21.14 kg/m²    BSA 1.66 m²    Flowsheets:    Anthropometrics       SmartForms:     [facility] AMB - FALL RISK       Encounter Info:    Billing Info,    History,    Allergies,    Detailed Report       Instructions     The ultrasound reveals that she had a cholecystectomy.  Additionally she has a fatty liver.  She will start on vitamin E 400 units daily.  Weight reduction would be ideal.  A Mediterranean diet would be ideal.  She will avoid the fatty foods.  The barium swallow revealed a hiatal hernia.  Esophageal motility and upper endoscopy will be scheduled.          After Visit Summary (Printed 11/18/2019)   Progress Notes        Subjective:       Patient ID: Clara Dotson is a 54 y.o. female.     Chief Complaint: Follow-up     The ultrasound reveals a fatty liver.  She has had a cholecystectomy.  The barium swallow reveals a hiatal hernia. She still complains of dysphagia particularly with some solid foods and big pills .  She denies acute obstruction or aspiration.  She denies hematemesis hematochezia jaundice or bleeding.  She has had occasional pyrosis.  She does not associate her symptoms with her previous cervical spine surgery.  She does not associate her symptoms with her medications.  Multiple ones which would alter her gastrointestinal function and esophageal peristalsis.  She denies fever chills or weight loss.        Allergies:        Review of patient's allergies indicates:   Allergen Reactions    Adhesive         Silk tape (white tape)     Pcn [penicillins] " Hives         Medications:     Current Outpatient Medications:     ascorbic acid, vitamin C, (VITAMIN C) 100 MG tablet, Take 100 mg by mouth once daily., Disp: , Rfl:     aspirin 325 MG tablet, Take 325 mg by mouth once daily., Disp: , Rfl:     enalapril (VASOTEC) 5 MG tablet, TAKE ONE TABLET BY MOUTH EVERY DAY **THANK YOU**, Disp: 30 tablet, Rfl: 1    ezetimibe (ZETIA) 10 mg tablet, TAKE ONE TABLET BY MOUTH EVERY DAY **THANK YOU**, Disp: 30 tablet, Rfl: 1    gabapentin (NEURONTIN) 300 MG capsule, TAKE ONE CAPSULE BY MOUTH THREE TIMES DAILY **THANK YOU**, Disp: 90 capsule, Rfl: 3    hydrocodone-acetaminophen 10-325mg (NORCO)  mg Tab, every 6 (six) hours as needed. , Disp: , Rfl:     isosorbide mononitrate (IMDUR) 60 MG 24 hr tablet, TAKE ONE TABLET BY MOUTH EVERY DAY **THANK YOU**, Disp: 30 tablet, Rfl: 1    nitroGLYCERIN (NITROSTAT) 0.4 MG SL tablet, DISSOLVE 1 TABLET UNDER THE TONGUE FOR CHEST PAIN MAY REPEAT EVERY FIVE MINUTES FOR NO MORE THAN 3 DOSES **THANK YOU**, Disp: 25 tablet, Rfl: 5    tiZANidine (ZANAFLEX) 4 MG tablet, Take 4 mg by mouth every 6 (six) hours as needed., Disp: , Rfl:     umeclidinium-vilanterol (ANORO ELLIPTA) 62.5-25 mcg/actuation DsDv, Inhale 1 puff into the lungs once daily. Controller, Disp: 1 each, Rfl: 5    VENTOLIN HFA 90 mcg/actuation inhaler, inhale TWO puffs into lungs EVERY 6 HOURS AS NEEDED FOR WHEEZING THANK YOU, Disp: , Rfl: 6    vitamin D (VITAMIN D3) 1000 units Tab, Take 1,000 Units by mouth once daily., Disp: , Rfl:     zolpidem (AMBIEN) 10 mg Tab, TAKE ONE TABLET BY MOUTH nightly AS NEEDED **THANK YOU**, Disp: 30 tablet, Rfl: 5     Current Facility-Administered Medications:     cyanocobalamin injection 100 mcg, 100 mcg, Intramuscular, Q30 Days, Israel Neely MD, 1,000 mcg at 06/02/18 0945          Past Medical History:   Diagnosis Date    Anemia      Estrogen receptor negative status (ER-) 5/15/2017    Malignant neoplasm of right breast  5/15/2017    Skin cancer                 Past Surgical History:   Procedure Laterality Date    BILATERAL MASTECTOMY        CHOLECYSTECTOMY        colon polyp removal        HYSTERECTOMY        LIPOSUCTION        melanoma surgery        NECK SURGERY        reconstructive breast         tummy tuck                Review of Systems   Constitutional: Negative for appetite change, fever and unexpected weight change.   HENT: Negative for trouble swallowing.         No jaundice.   Respiratory: Negative for cough, shortness of breath and wheezing.         She is a former cigarette smoker.  She denies dysphagia aspiration exertional dyspnea or hemoptysis.  The pulmonary inhaler has helped her when she has had symptoms.   Cardiovascular: Negative for chest pain.        She denies exertional chest pain.  She states her hypertension and hyperlipidemia well controlled.  She denies rhythm disturbance.   Gastrointestinal: Positive for rectal pain.        She has had occasional nausea without vomiting. She cannot pinpoint a precipitating factor for her GI symptoms.  She cannot pinpoint a specific food gastrointestinal function or musculoskeletal activit that influence is her her dysphagia and pyrosis.   Musculoskeletal: Positive for arthralgias. Negative for back pain and neck pain.   Skin: Negative for pallor and rash.   Neurological: Negative for dizziness, seizures, syncope, speech difficulty, weakness and numbness.   Hematological: Negative for adenopathy.   Psychiatric/Behavioral: Negative for confusion.       Objective:   Physical Exam   Constitutional: She is oriented to person, place, and time. She appears well-developed and well-nourished.   Well-nourished well-hydrated nonicteric white female   HENT:   Head: Normocephalic.   Eyes: Pupils are equal, round, and reactive to light. EOM are normal.   Neck: Normal range of motion. Neck supple. No tracheal deviation present. No thyromegaly present.   Surgical scar    Cardiovascular: Normal rate, regular rhythm and normal heart sounds.   Pulmonary/Chest: Effort normal and breath sounds normal.   Abdominal: Soft. Bowel sounds are normal. She exhibits no distension and no mass. There is no tenderness. There is no rebound and no guarding. No hernia.   Soft without tenderness masses organomegaly.  Bowel sounds are normal.   Musculoskeletal: Normal range of motion.   She can ambulate normally.  She can go from the sitting to the standing position without difficulty.   Lymphadenopathy:     She has no cervical adenopathy.   Neurological: She is alert and oriented to person, place, and time. No cranial nerve deficit.   Skin: Skin is warm and dry.   Psychiatric: She has a normal mood and affect. Her behavior is normal.   Vitals reviewed.         Plan:       Dysphagia, unspecified type  -     Manometry esophageal; Future     Gastroesophageal reflux disease without esophagitis     Hiatal hernia      She will continue her reflux regimen current medications.  She continues her bowel program to avoid constipation. She will start vitamin E 400 units daily.  She will be scheduled for esophageal motility and in addition for upper endoscopy.  She has good health with her C.  She understands the procedures of upper endoscopy.  Specifically she realizes there is an extremely small incidence of bleeding perforation or aspiration.  She will make a food diary and attempt to identify the off ending foods and precipitating factors influencing her esophageal symptoms.          Other Notes      All notes   Communications         Letter sent to Chai Adamson MD    AMB Visit Summary: Provider Version   Not recorded   All Charges for This Encounter     Code Description Service Date Service Provider Modifiers Qty   66364 ME OFFICE/OUTPT VISIT,EST,LEVL IV 11/18/2019 Bayron Adamson MD S$PBB 1   [id number]  E&M-EST. PATIENT - LVL III 11/18/2019 Bayron Adamson MD PBBFAC, PN 1   [id number] ME PBB SHADOW  E&M-EST. PATIENT-LVL III 11/18/2019 Bayron Adamson MD PBBFAC 1   Level of Service     Level of Service   [place] OFFICE/OUTPT VISIT,GUSTAVO WEST IV [89276]   BestPractice Advisories     Click to view BestPractice Advisory history   AVS Reports     Date/Time Report Action User   11/18/2019 11:36 AM After Visit Summary Printed Billie Nazario LPN   Encounter-Level Documents - 11/18/2019:     After Visit Summary - Document on 11/18/2019 11:36 AM by Billie Nazario LPN: After Visit Summary   Orders Placed      Hepatitis panel, acute    Manometry esophageal   Medication Changes         albuterol sulfate                 90 mcg/actuation Hfaa, inhale TWO puffs into lungs EVERY 6 HOURS AS NEEDED FOR WHEEZING THANK YOU      Medication List    Fall Risk     Patient Mobility Status: Ambulatory   Number of falls in the past 12 months?: 0   Fall Risk?: No   Visit Diagnoses         Dysphagia, unspecified type      Gastroesophageal reflux disease without esophagitis      Hiatal hernia      Need for hepatitis C screening test      Problem List      History and physical of are unchanged.  She has had dyspepsia and episodes of dysphagia.  She denies aspiration jaundice or bleeding.  She was found have a hiatal hernia with probable reflux esophagitis.  She has good health knowledge.  She understands the procedures of upper endoscopy.  Specifically she realizes there is an extremely small incidence of bleeding perforation or aspiration.  Physical examination reveals a well-nourished well-hydrated white female.  She is normocephalic.  Jaundice is absent.  Pupils are normal.  Lungs reveal symmetrical respirations.  Heart reveals regular rhythm.  The abdomen is soft without organomegaly masses felt.  Bowel sounds are normal. Neurologic review she is oriented x3 impression 1.  Gastroesophageal reflux with hiatal hernia and possibly a motility disorder with dysphagia.  Initially upper endoscopy.  She has a scheduled esophageal motility study in  addition.

## 2020-01-14 NOTE — [employer] PATIENT INSTRUCTIONS
Discharge Summary/Instructions after an Endoscopic Procedure  Patient Name: Clara Dotson  Patient MRN: [id number]  Patient YOB: 1965 Tuesday, January 14, 2020  Bayron Adamson MD  RESTRICTIONS:  During your procedure today, you received medications for sedation.  These   medications may affect your judgment, balance and coordination.  Therefore,   for 24 hours, you have the following restrictions:   - DO NOT drive a car, operate machinery, make legal/financial decisions,   sign important papers or drink alcohol.    ACTIVITY:  Today: no heavy lifting, straining or running due to procedural   sedation/anesthesia.  The following day: return to full activity including work.  DIET:  Eat and drink normally unless instructed otherwise.     TREATMENT FOR COMMON SIDE EFFECTS:  - Mild abdominal pain, nausea, belching, bloating or excessive gas:  rest,   eat lightly and use a heating pad.  - Sore Throat: treat with throat lozenges and/or gargle with warm salt   water.  - Because air was used during the procedure, expelling large amounts of air   from your rectum or belching is normal.  - If a bowel prep was taken, you may not have a bowel movement for 1-3 days.    This is normal.  SYMPTOMS TO WATCH FOR AND REPORT TO YOUR PHYSICIAN:  1. Abdominal pain or bloating, other than gas cramps.  2. Chest pain.  3. Back pain.  4. Signs of infection such as: chills or fever occurring within 24 hours   after the procedure.  5. Rectal bleeding, which would show as bright red, maroon, or black stools.   (A tablespoon of blood from the rectum is not serious, especially if   hemorrhoids are present.)  6. Vomiting.  7. Weakness or dizziness.  GO DIRECTLY TO THE NEAREST EMERGENCY ROOM IF YOU HAVE ANY OF THE FOLLOWING:      Difficulty breathing              Chills and/or fever over 101 F   Persistent vomiting and/or vomiting blood   Severe abdominal pain   Severe chest pain   Black, tarry stools   Bleeding- more than one  tablespoon   Any other symptom or condition that you feel may need urgent attention  Your doctor recommends these additional instructions:  If any biopsies were taken, your doctors clinic will contact you in 1 to 2   weeks with any results.  - Discharge patient to home (ambulatory).   - Resume previous diet.  For questions, problems or results please call your physician - Bayron Adamson MD at Work:  [phone].  [facility] EMERGENCY ROOM PHONE NUMBER: [phone]  IF A COMPLICATION OR EMERGENCY SITUATION ARISES AND YOU ARE UNABLE TO REACH   YOUR PHYSICIAN - GO DIRECTLY TO THE EMERGENCY ROOM.  MD Bayron Bishop MD  1/14/2020 9:33:43 AM  This report has been verified and signed electronically.  [employer]

## 2020-01-14 NOTE — OP NOTE
Procedure. Esophageal gastroduodenoscopy with biopsies.  Indications dyspepsia pyrosis and occasional dysphagia.  She denies aspiration pulmonary symptoms jaundice or bleeding.  She has good health knowledge.  She understands the procedures of upper endoscopy.  Specifically she realizes there is an extremely small incidence of bleeding perforation or aspiration.  Anesthesia.  Monitored anesthesia coverage.  Procedure. With the patient in the left lateral supine position the procedure room.  The Pentax upper endoscope was passed without difficulty. The gastroesophageal junction was hyperemic and identified at 37-38 cm this could junction at 37-38 cm.  Diaphragmatic hiatus appeared to be at 39-40 cm.  The cardia revealed a small hiatal hernia. The cardia body and antrum was diffusely hyperemic.  The friable pre-pyloric mucosa was biopsied for histology.  There was minimal deformity of the pylorus.  First and 2nd parts of the duodenum were normal.  Patient tolerated the procedure well.  Impression 1.  Hiatal hernia 2.  Esophagitis LA grade A 3.  Gastritis.

## 2020-01-14 NOTE — TRANSFER OF CARE
"Anesthesia Transfer of Care Note    Patient: Clara Dotson    Procedure(s) Performed: Procedure(s) (LRB):  EGD (ESOPHAGOGASTRODUODENOSCOPY) (N/A)    Patient location: PACU    Anesthesia Type: general    Transport from OR: Transported from OR on room air with adequate spontaneous ventilation    Post pain: adequate analgesia    Post assessment: no apparent anesthetic complications and tolerated procedure well    Post vital signs: stable    Level of consciousness: sedated    Nausea/Vomiting: no nausea/vomiting    Complications: none    Transfer of care protocol was followed      Last vitals:   Visit Vitals  /80 (BP Location: Left arm, Patient Position: Lying)   Pulse (!) 59   Temp 36.5 °C (97.7 °F) (Oral)   Resp 16   Ht 5' 4" (1.626 m)   Wt 59 kg (130 lb)   SpO2 100%   Breastfeeding? No   BMI 22.31 kg/m²     "

## 2020-01-14 NOTE — PLAN OF CARE
Pt arrives to PACU via stretcher resting quietly, PIV intact and infusing LR's to gravity. VSS, No complaints will continue to monitor

## 2020-01-14 NOTE — DISCHARGE SUMMARY
Upper endoscopy revealed esophagitis hiatal hernia and gastritis.  Biopsies of pending for H pylori.  She will continue her reflux regimen current medications vitamins and minerals.  She will make a food diary and avoid the offending foods.  She has a followup upon the office.

## 2020-01-14 NOTE — ANESTHESIA PREPROCEDURE EVALUATION
01/14/2020  Clara Dotson is a 54 y.o., female.    Anesthesia Evaluation    I have reviewed the Patient Summary Reports.    I have reviewed the Nursing Notes.   I have reviewed the Medications.     Review of Systems  Social:  Former Smoker    Hematology/Oncology:         Breast Current/Recent Cancer.   Pulmonary:   COPD    Hepatic/GI:   Hiatal Hernia, GERD        Physical Exam  General:  Well nourished    Airway/Jaw/Neck:  Airway Findings: Mouth Opening: Normal Tongue: Normal  General Airway Assessment: Adult  Mallampati: II  TM Distance: Normal, at least 6 cm      Dental:  Dental Findings: Upper Dentures   Chest/Lungs:  Chest/Lungs Findings: Clear to auscultation     Heart/Vascular:  Heart Findings: Rate: Normal  Rhythm: Regular Rhythm        Mental Status:  Mental Status Findings:  Cooperative, Alert and Oriented         Anesthesia Plan  Type of Anesthesia, risks & benefits discussed:  Anesthesia Type:  general  Patient's Preference:   Intra-op Monitoring Plan: standard ASA monitors  Intra-op Monitoring Plan Comments:   Post Op Pain Control Plan: IV/PO Opioids PRN  Post Op Pain Control Plan Comments:   Induction:   IV  Beta Blocker:  Patient is not currently on a Beta-Blocker (No further documentation required).       Informed Consent: Patient understands risks and agrees with Anesthesia plan.  Questions answered. Anesthesia consent signed with patient.  ASA Score: 2     Day of Surgery Review of History & Physical: I have interviewed and examined the patient. I have reviewed the patient's H&P dated:            Ready For Surgery From Anesthesia Perspective.

## 2020-01-16 ENCOUNTER — TELEPHONE (OUTPATIENT)
Dept: PULMONOLOGY | Facility: HOSPITAL | Age: 55
End: 2020-01-16

## 2020-01-20 ENCOUNTER — OFFICE VISIT (OUTPATIENT)
Dept: GASTROENTEROLOGY | Facility: CLINIC | Age: 55
End: 2020-01-20
Payer: MEDICARE

## 2020-01-20 VITALS
HEIGHT: 64 IN | WEIGHT: 132 LBS | SYSTOLIC BLOOD PRESSURE: 131 MMHG | HEART RATE: 58 BPM | RESPIRATION RATE: 18 BRPM | BODY MASS INDEX: 22.53 KG/M2 | OXYGEN SATURATION: 96 % | DIASTOLIC BLOOD PRESSURE: 82 MMHG

## 2020-01-20 DIAGNOSIS — K21.9 GASTROESOPHAGEAL REFLUX DISEASE, ESOPHAGITIS PRESENCE NOT SPECIFIED: Primary | ICD-10-CM

## 2020-01-20 DIAGNOSIS — K44.9 HIATAL HERNIA: ICD-10-CM

## 2020-01-20 DIAGNOSIS — K76.0 FATTY LIVER: ICD-10-CM

## 2020-01-20 DIAGNOSIS — Z90.49 HISTORY OF CHOLECYSTECTOMY: ICD-10-CM

## 2020-01-20 DIAGNOSIS — R13.10 DYSPHAGIA, UNSPECIFIED TYPE: ICD-10-CM

## 2020-01-20 PROCEDURE — 99999 PR PBB SHADOW E&M-EST. PATIENT-LVL IV: ICD-10-PCS | Mod: PBBFAC,,, | Performed by: INTERNAL MEDICINE

## 2020-01-20 PROCEDURE — 99213 PR OFFICE/OUTPT VISIT, EST, LEVL III, 20-29 MIN: ICD-10-PCS | Mod: S$PBB,,, | Performed by: INTERNAL MEDICINE

## 2020-01-20 PROCEDURE — 99214 OFFICE O/P EST MOD 30 MIN: CPT | Mod: PBBFAC,PN | Performed by: INTERNAL MEDICINE

## 2020-01-20 PROCEDURE — 99213 OFFICE O/P EST LOW 20 MIN: CPT | Mod: S$PBB,,, | Performed by: INTERNAL MEDICINE

## 2020-01-20 PROCEDURE — 99999 PR PBB SHADOW E&M-EST. PATIENT-LVL IV: CPT | Mod: PBBFAC,,, | Performed by: INTERNAL MEDICINE

## 2020-01-20 RX ORDER — OMEPRAZOLE 20 MG/1
20 CAPSULE, DELAYED RELEASE ORAL DAILY
Qty: 90 CAPSULE | Refills: 3 | Status: SHIPPED | OUTPATIENT
Start: 2020-01-20 | End: 2021-01-19

## 2020-01-20 NOTE — PROGRESS NOTES
Subjective:       Patient ID: Clara Dotson is a 54 y.o. female.    Chief Complaint: Follow-up    Upper endoscopy revealed a hiatal hernia gastroesophageal reflux and gastritis.  The gastric biopsies are pending for H pylori.  She complains of pyrosis but she denies aspiration jaundice hematemesis hematochezia.  She generally has daily bowel movements.  She states and she has had her cervical spine surgery when the something gets the back my throat have a sensation the can't swallow well .  This is not with all foods.  She cannot pinpoint a specific food or liquid.  She denies aspiration.  She she is having daily bowel movements.      Allergies:  Review of patient's allergies indicates:   Allergen Reactions    Adhesive      Silk tape (white tape)     Pcn [penicillins] Hives       Medications:    Current Outpatient Medications:     ascorbic acid, vitamin C, (VITAMIN C) 100 MG tablet, Take 100 mg by mouth once daily., Disp: , Rfl:     aspirin 325 MG tablet, Take 325 mg by mouth once daily., Disp: , Rfl:     enalapril (VASOTEC) 5 MG tablet, TAKE ONE TABLET BY MOUTH EVERY DAY **THANK YOU**, Disp: 30 tablet, Rfl: 1    ezetimibe (ZETIA) 10 mg tablet, TAKE ONE TABLET BY MOUTH EVERY DAY **THANK YOU**, Disp: 30 tablet, Rfl: 1    gabapentin (NEURONTIN) 300 MG capsule, TAKE ONE CAPSULE BY MOUTH THREE TIMES DAILY **THANK YOU**, Disp: 90 capsule, Rfl: 3    hydrocodone-acetaminophen 10-325mg (NORCO)  mg Tab, every 6 (six) hours as needed. , Disp: , Rfl:     isosorbide mononitrate (IMDUR) 60 MG 24 hr tablet, TAKE ONE TABLET BY MOUTH EVERY DAY **THANK YOU**, Disp: 30 tablet, Rfl: 1    nitroGLYCERIN (NITROSTAT) 0.4 MG SL tablet, DISSOLVE 1 TABLET UNDER THE TONGUE FOR CHEST PAIN MAY REPEAT EVERY FIVE MINUTES FOR NO MORE THAN 3 DOSES **THANK YOU**, Disp: 25 tablet, Rfl: 5    tiotropium (SPIRIVA WITH HANDIHALER) 18 mcg inhalation capsule, Inhale 1 capsule (18 mcg total) into the lungs once daily. Controller,  Disp: 30 capsule, Rfl: 5    tiZANidine (ZANAFLEX) 4 MG tablet, Take 4 mg by mouth every 6 (six) hours as needed., Disp: , Rfl:     VENTOLIN HFA 90 mcg/actuation inhaler, inhale TWO puffs into lungs EVERY 6 HOURS AS NEEDED FOR WHEEZING THANK YOU, Disp: , Rfl: 6    vitamin D (VITAMIN D3) 1000 units Tab, Take 1,000 Units by mouth once daily., Disp: , Rfl:     vitamin E 1000 UNIT capsule, Take 1,000 Units by mouth once daily., Disp: , Rfl:     omeprazole (PRILOSEC) 20 MG capsule, Take 1 capsule (20 mg total) by mouth once daily., Disp: 90 capsule, Rfl: 3    Current Facility-Administered Medications:     cyanocobalamin injection 100 mcg, 100 mcg, Intramuscular, Q30 Days, Israel Neely MD, 1,000 mcg at 06/02/18 0945    Past Medical History:   Diagnosis Date    Anemia     Estrogen receptor negative status (ER-) 5/15/2017    Malignant neoplasm of right breast 5/15/2017    Skin cancer        Past Surgical History:   Procedure Laterality Date    BILATERAL MASTECTOMY      CHOLECYSTECTOMY      colon polyp removal      ESOPHAGOGASTRODUODENOSCOPY N/A 1/14/2020    Procedure: EGD (ESOPHAGOGASTRODUODENOSCOPY);  Surgeon: Bayron Adamson MD;  [facility];  Service: Endoscopy;  Laterality: N/A;    HYSTERECTOMY      LIPOSUCTION      melanoma surgery      NECK SURGERY      reconstructive breast       tummy ck           Review of Systems   Constitutional: Negative for appetite change, fever and unexpected weight change.   HENT: Positive for trouble swallowing.         No jaundice.   Respiratory: Negative for cough, shortness of breath and wheezing.         She is a former cigarette smoker.  She is followed by the pulmonologist.  She denies dyspnea on exertion.  She denies hemoptysis aspiration or chronic cough or chronic sputum production.   Cardiovascular: Negative for chest pain.   Gastrointestinal: Negative for abdominal distention, abdominal pain, anal bleeding, blood in stool, constipation,  diarrhea, nausea, rectal pain and vomiting.   Musculoskeletal: Positive for arthralgias, back pain, neck pain and neck stiffness.        She has had cervical spine surgery.  She has chronic neck and back pain. She is followed in the Pain Clinic.   Skin: Negative for pallor and rash.   Neurological: Negative for dizziness, seizures, syncope, speech difficulty, weakness and numbness.   Hematological: Negative for adenopathy.   Psychiatric/Behavioral: Negative for confusion.        The [facility].  Stopped  The Ambien and she complains of insomnia.       Objective:      Physical Exam   Constitutional: She is oriented to person, place, and time. She appears well-developed and well-nourished.   Well-nourished well-hydrated nonicteric white female.  She has a firm hand .   HENT:   Head: Normocephalic.   Eyes: Pupils are equal, round, and reactive to light. EOM are normal.   Neck: Normal range of motion. Neck supple. No tracheal deviation present. No thyromegaly present.   Surgical scar   Cardiovascular: Normal rate, regular rhythm and normal heart sounds.   Pulmonary/Chest: Effort normal and breath sounds normal.   Abdominal: Soft. Bowel sounds are normal. She exhibits no distension and no mass. There is no tenderness. There is no rebound and no guarding.   The abdomen is soft without tenderness masses organomegaly.  Bowel sounds are normal.   Musculoskeletal: Normal range of motion.   She can ambulate normally.  She can go from the sitting to the standing position without difficulty.   Lymphadenopathy:     She has no cervical adenopathy.   Neurological: She is alert and oriented to person, place, and time. No cranial nerve deficit.   Skin: Skin is warm and dry.   Psychiatric: She has a normal mood and affect. Her behavior is normal.   Vitals reviewed.        Plan:       Gastroesophageal reflux disease, esophagitis presence not specified  -     omeprazole (PRILOSEC) 20 MG capsule; Take 1 capsule (20 mg total) by  mouth once daily.  Dispense: 90 capsule; Refill: 3    Dysphagia, unspecified type  -     Manometry esophageal; Future    Hiatal hernia    History of cholecystectomy    Fatty liver     She will continue her reflux regimen.  She follows up in the Pain Clinic.  She is followed by the pulmonologist.  She continues her vitamins and minerals including vitamin-E.  A Mediterranean diet would be helpful.  She continues the fiber supplements to avoid constipation. She will be referred for esophageal motility.

## 2020-01-20 NOTE — PATIENT INSTRUCTIONS
She will be scheduled for the esophageal motility study because of the difficulty with swallowing.  She is started on the omeprazole daily.  She was found have a hiatal hernia and gastroesophageal reflux.  She will make a food diary and avoid the offending foods.  She has a fatty liver and will plan to continue the vitamins including vitamin-E.  She will follow up in the Pain Clinic.

## 2020-01-20 NOTE — LETTER
January 20, 2020      Chai Adamson MD  [address]  [place] [address]           [facility]  [place] [facility]  [address], [address]  [place] [address]  Phone: [phone]  Fax: [phone]          Patient: Clara Dotson   MR Number: [id number]   YOB: 1965   Date of Visit: 1/20/2020       Dear Dr. Chai Adamson:    Thank you for referring Clara Dotson to me for evaluation. Attached you will find relevant portions of my assessment and plan of care.    If you have questions, please do not hesitate to call me. I look forward to following Clara Dotson along with you.    Sincerely,    Bayron Adamson MD    Enclosure  CC:  No Recipients    If you would like to receive this communication electronically, please contact [email or web address] or [phone] to request more information on [employer] Link access.    For providers and/or their staff who would like to refer a patient to [facility], please contact us through our one-stop-shop provider referral line, [facility] , at [phone].    If you feel you have received this communication in error or would no longer like to receive these types of communications, please e-mail [email or web address]

## 2020-01-25 LAB
FINAL PATHOLOGIC DIAGNOSIS: NORMAL
GROSS: NORMAL

## 2020-01-27 ENCOUNTER — TELEPHONE (OUTPATIENT)
Dept: GASTROENTEROLOGY | Facility: CLINIC | Age: 55
End: 2020-01-27

## 2020-01-27 NOTE — TELEPHONE ENCOUNTER
----- Message from Bayron Adamson MD sent at 1/27/2020  7:10 AM CST -----  Tell her the biopsies are negative for H pylori.

## 2020-01-29 ENCOUNTER — TELEPHONE (OUTPATIENT)
Dept: FAMILY MEDICINE | Facility: CLINIC | Age: 55
End: 2020-01-29

## 2020-01-29 ENCOUNTER — LAB VISIT (OUTPATIENT)
Dept: LAB | Facility: HOSPITAL | Age: 55
End: 2020-01-29
Attending: FAMILY MEDICINE
Payer: MEDICARE

## 2020-01-29 ENCOUNTER — OFFICE VISIT (OUTPATIENT)
Dept: FAMILY MEDICINE | Facility: CLINIC | Age: 55
End: 2020-01-29
Payer: MEDICARE

## 2020-01-29 VITALS
BODY MASS INDEX: 22.53 KG/M2 | HEIGHT: 64 IN | TEMPERATURE: 98 F | DIASTOLIC BLOOD PRESSURE: 99 MMHG | SYSTOLIC BLOOD PRESSURE: 180 MMHG | RESPIRATION RATE: 17 BRPM | WEIGHT: 132 LBS | OXYGEN SATURATION: 96 % | HEART RATE: 101 BPM

## 2020-01-29 DIAGNOSIS — R68.89 FLU-LIKE SYMPTOMS: Primary | ICD-10-CM

## 2020-01-29 DIAGNOSIS — R68.89 FLU-LIKE SYMPTOMS: ICD-10-CM

## 2020-01-29 DIAGNOSIS — R11.2 INTRACTABLE VOMITING WITH NAUSEA, UNSPECIFIED VOMITING TYPE: ICD-10-CM

## 2020-01-29 DIAGNOSIS — R10.84 GENERALIZED ABDOMINAL PAIN: ICD-10-CM

## 2020-01-29 LAB
INFLUENZA A, MOLECULAR: NEGATIVE
INFLUENZA B, MOLECULAR: NEGATIVE
SPECIMEN SOURCE: NORMAL

## 2020-01-29 PROCEDURE — 99214 OFFICE O/P EST MOD 30 MIN: CPT | Mod: 25,S$GLB,, | Performed by: FAMILY MEDICINE

## 2020-01-29 PROCEDURE — 87502 INFLUENZA DNA AMP PROBE: CPT

## 2020-01-29 PROCEDURE — 96372 PR INJECTION,THERAP/PROPH/DIAG2ST, IM OR SUBCUT: ICD-10-PCS | Mod: S$GLB,,, | Performed by: FAMILY MEDICINE

## 2020-01-29 PROCEDURE — 96372 THER/PROPH/DIAG INJ SC/IM: CPT | Mod: S$GLB,,, | Performed by: FAMILY MEDICINE

## 2020-01-29 PROCEDURE — 99214 PR OFFICE/OUTPT VISIT, EST, LEVL IV, 30-39 MIN: ICD-10-PCS | Mod: 25,S$GLB,, | Performed by: FAMILY MEDICINE

## 2020-01-29 RX ORDER — ONDANSETRON 2 MG/ML
4 INJECTION INTRAMUSCULAR; INTRAVENOUS
Status: DISCONTINUED | OUTPATIENT
Start: 2020-01-29 | End: 2020-01-29 | Stop reason: HOSPADM

## 2020-01-29 RX ORDER — ONDANSETRON HYDROCHLORIDE 8 MG/1
8 TABLET, FILM COATED ORAL EVERY 8 HOURS PRN
Qty: 30 TABLET | Refills: 0 | Status: SHIPPED | OUTPATIENT
Start: 2020-01-29

## 2020-01-29 RX ADMIN — ONDANSETRON 4 MG: 2 INJECTION INTRAMUSCULAR; INTRAVENOUS at 03:01

## 2020-01-29 NOTE — TELEPHONE ENCOUNTER
----- Message from Eleanor Crocker sent at 1/29/2020 11:15 AM CST -----  Contact: Clara pt  Type:  Same Day Appointment Request    Caller is requesting a same day appointment.  Caller declined first available appointment listed below.      Name of Caller:  Clara  When is the first available appointment?  01/30/20  Symptoms:  Pt is vomiting and has diarrhea  Best Call Back Number:  [phone]  Additional Information:   Pls call pt to adv if she can be seen today

## 2020-01-29 NOTE — TELEPHONE ENCOUNTER
----- Message from Hallie Brady sent at 1/29/2020  3:45 PM CST -----  Type:  Patient Returning Call    Who Called: Clara  Who Left Message for Patient:  Judith  Does the patient know what this is regarding?:  results  Best Call Back Number:  [phone]  Additional Information:  Thank you!

## 2020-01-29 NOTE — PROGRESS NOTES
[facility] - Clinic Note    Subjective      Ms. Dotson is a 54 y.o. female who presents to clinic with complaints of abdominal pain.    Complains of generalized abdominal pain for the past day.  associated with nausea and vomiting. Vomiting up green like fluid.   Unable to tolerate PO intake or fluids.   Admits to fevers and body aches  No known sick contacts  Has not taken anything OTC.    PMH Clara has a past medical history of Anemia, Estrogen receptor negative status (ER-) (5/15/2017), Malignant neoplasm of right breast (5/15/2017), and Skin cancer.   PSXH Clara has a past surgical history that includes melanoma surgery; Bilateral mastectomy; tummy tuck; Liposuction; reconstructive breast ; Neck surgery; Cholecystectomy; Hysterectomy; colon polyp removal; and Esophagogastroduodenoscopy (N/A, 1/14/2020).   FH Clara's family history includes Cancer in her father and mother; Diabetes in her mother; Hypertension in her father and mother.   SH Clara reports that she quit smoking about 6 years ago. She has never used smokeless tobacco. She reports that she does not drink alcohol or use drugs.   ALG Clara is allergic to adhesive and pcn [penicillins].   MED Clara has a current medication list which includes the following prescription(s): ascorbic acid (vitamin c), aspirin, enalapril, ezetimibe, gabapentin, hydrocodone-acetaminophen, isosorbide mononitrate, nitroglycerin, omeprazole, tizanidine, ventolin hfa, vitamin d, vitamin e, chlordiazepoxide-clidinium 5-2.5 mg, ciprofloxacin hcl, ondansetron, ondansetron, sucralfate, and tiotropium, and the following Facility-Administered Medications: cyanocobalamin.     Review of Systems   Constitutional: Positive for activity change, appetite change, chills, fatigue and fever.   HENT: Negative for congestion, ear discharge, ear pain, postnasal drip, rhinorrhea, sinus pressure, sinus pain and sore throat.    Eyes: Negative for visual disturbance.   Respiratory:  "Negative for cough and shortness of breath.    Cardiovascular: Negative for chest pain, palpitations and leg swelling.   Gastrointestinal: Positive for abdominal distention, abdominal pain, diarrhea, nausea and vomiting. Negative for constipation.   Neurological: Positive for headaches. Negative for dizziness and syncope.   Psychiatric/Behavioral: Negative for confusion.     Objective     BP (!) 180/99   Pulse 101   Temp 98.3 °F (36.8 °C) (Oral)   Resp 17   Ht 5' 4" (1.626 m)   Wt 59.9 kg (132 lb)   SpO2 96%   BMI 22.66 kg/m²     Physical Exam   Constitutional: She appears distressed.   Ill appearing WF laying on the bed   HENT:   Head: Normocephalic and atraumatic.   Right Ear: External ear normal.   Left Ear: External ear normal.   Nose: Nose normal.   Mouth/Throat: Oropharynx is clear and moist. No oropharyngeal exudate.   Eyes: Pupils are equal, round, and reactive to light. Conjunctivae are normal. Right eye exhibits no discharge. Left eye exhibits no discharge. No scleral icterus.   Cardiovascular: Normal rate, regular rhythm, normal heart sounds and intact distal pulses. Exam reveals no gallop and no friction rub.   No murmur heard.  Pulmonary/Chest: Effort normal and breath sounds normal. No respiratory distress. She has no wheezes. She has no rales.   Abdominal: Soft. She exhibits distension. She exhibits no mass. There is tenderness. There is no rebound and no guarding.   Generalized TTP   Lymphadenopathy:     She has no cervical adenopathy.   Neurological: She is alert.   Skin: Skin is warm and dry. Capillary refill takes less than 2 seconds. She is not diaphoretic.   Psychiatric: She has a normal mood and affect. Her behavior is normal.   Vitals reviewed.     Assessment/Plan     Clara was seen today for abdominal pain.    Diagnoses and all orders for this visit:  -New patient and new problem to me    Flu-like symptoms  -     Influenza A & B by Molecular; Future    Generalized abdominal pain  -    "  ondansetron (ZOFRAN) 8 MG tablet; Take 1 tablet (8 mg total) by mouth every 8 (eight) hours as needed for Nausea.  -     X-Ray Abdomen Flat And Erect; Future    Intractable vomiting with nausea, unspecified vomiting type  -     ondansetron (ZOFRAN) 8 MG tablet; Take 1 tablet (8 mg total) by mouth every 8 (eight) hours as needed for Nausea.    -bland diet. stay hydration.      Future Appointments   Date Time Provider Department Center   2/11/2020 11:00 AM [facility]   2/18/2020  1:20 PM Kevin Padilla MD [facility]   3/4/2020 10:30 AM Orestes Alvarado MD [facility] [place] [facility]   3/30/2020  2:00 PM Israel Neely MD [facility] HEM ONC [facility] Hi   4/17/2020  2:00 PM Chai Adamson MD [facility]   4/20/2020  9:00 AM Bayron Adamson MD [facility] GASTRO [facility] C       Kevin Padilla MD  Family Medicine  [facility]

## 2020-01-29 NOTE — TELEPHONE ENCOUNTER
Spoke with patient, notified of negative flu. Also notified patient that Xray Abdominal was ordered. Patient verbalized understanding and agreed to have this done.

## 2020-01-30 ENCOUNTER — HOSPITAL ENCOUNTER (OUTPATIENT)
Facility: HOSPITAL | Age: 55
Discharge: HOME | End: 2020-02-03
Attending: INTERNAL MEDICINE | Admitting: INTERNAL MEDICINE
Payer: MEDICARE

## 2020-01-30 ENCOUNTER — TELEPHONE (OUTPATIENT)
Dept: FAMILY MEDICINE | Facility: CLINIC | Age: 55
End: 2020-01-30

## 2020-01-30 ENCOUNTER — HOSPITAL ENCOUNTER (OUTPATIENT)
Dept: RADIOLOGY | Facility: HOSPITAL | Age: 55
Discharge: HOME | End: 2020-01-30
Attending: FAMILY MEDICINE
Payer: MEDICARE

## 2020-01-30 ENCOUNTER — OFFICE VISIT (OUTPATIENT)
Dept: FAMILY MEDICINE | Facility: CLINIC | Age: 55
End: 2020-01-30
Attending: FAMILY MEDICINE
Payer: MEDICARE

## 2020-01-30 VITALS
DIASTOLIC BLOOD PRESSURE: 81 MMHG | WEIGHT: 132 LBS | HEART RATE: 79 BPM | TEMPERATURE: 98 F | HEIGHT: 64 IN | BODY MASS INDEX: 22.53 KG/M2 | RESPIRATION RATE: 17 BRPM | SYSTOLIC BLOOD PRESSURE: 155 MMHG

## 2020-01-30 DIAGNOSIS — R63.8 DECREASED ORAL INTAKE: ICD-10-CM

## 2020-01-30 DIAGNOSIS — N17.9 ACUTE KIDNEY INJURY: ICD-10-CM

## 2020-01-30 DIAGNOSIS — R11.2 INTRACTABLE NAUSEA AND VOMITING: Primary | ICD-10-CM

## 2020-01-30 DIAGNOSIS — R10.84 GENERALIZED ABDOMINAL PAIN: ICD-10-CM

## 2020-01-30 DIAGNOSIS — R11.2 INTRACTABLE VOMITING WITH NAUSEA, UNSPECIFIED VOMITING TYPE: Primary | ICD-10-CM

## 2020-01-30 LAB
ALBUMIN SERPL BCP-MCNC: 4.4 G/DL (ref 3.5–5.2)
ALP SERPL-CCNC: 70 U/L (ref 55–135)
ALT SERPL W/O P-5'-P-CCNC: 39 U/L (ref 10–44)
AMYLASE SERPL-CCNC: 35 U/L (ref 20–110)
ANION GAP SERPL CALC-SCNC: 9 MMOL/L (ref 8–16)
AST SERPL-CCNC: 27 U/L (ref 10–40)
BASOPHILS # BLD AUTO: 0.02 K/UL (ref 0–0.2)
BASOPHILS NFR BLD: 0.3 % (ref 0–1.9)
BILIRUB SERPL-MCNC: 0.5 MG/DL (ref 0.1–1)
BILIRUB UR QL STRIP: NEGATIVE
BUN SERPL-MCNC: 24 MG/DL (ref 6–20)
CALCIUM SERPL-MCNC: 8.8 MG/DL (ref 8.7–10.5)
CHLORIDE SERPL-SCNC: 108 MMOL/L (ref 95–110)
CLARITY UR: CLEAR
CO2 SERPL-SCNC: 24 MMOL/L (ref 23–29)
COLOR UR: YELLOW
CREAT SERPL-MCNC: 1.3 MG/DL (ref 0.5–1.4)
DIFFERENTIAL METHOD: ABNORMAL
EOSINOPHIL # BLD AUTO: 0 K/UL (ref 0–0.5)
EOSINOPHIL NFR BLD: 0 % (ref 0–8)
ERYTHROCYTE [DISTWIDTH] IN BLOOD BY AUTOMATED COUNT: 12.6 % (ref 11.5–14.5)
EST. GFR  (AFRICAN AMERICAN): 53.4 ML/MIN/1.73 M^2
EST. GFR  (NON AFRICAN AMERICAN): 46.3 ML/MIN/1.73 M^2
GLUCOSE SERPL-MCNC: 120 MG/DL (ref 70–110)
GLUCOSE UR QL STRIP: NEGATIVE
HCT VFR BLD AUTO: 38.4 % (ref 37–48.5)
HGB BLD-MCNC: 12.8 G/DL (ref 12–16)
HGB UR QL STRIP: ABNORMAL
IMM GRANULOCYTES # BLD AUTO: 0.02 K/UL (ref 0–0.04)
IMM GRANULOCYTES NFR BLD AUTO: 0.3 % (ref 0–0.5)
KETONES UR QL STRIP: ABNORMAL
LEUKOCYTE ESTERASE UR QL STRIP: NEGATIVE
LIPASE SERPL-CCNC: 25 U/L (ref 4–60)
LYMPHOCYTES # BLD AUTO: 0.9 K/UL (ref 1–4.8)
LYMPHOCYTES NFR BLD: 14.8 % (ref 18–48)
MCH RBC QN AUTO: 29.2 PG (ref 27–31)
MCHC RBC AUTO-ENTMCNC: 33.3 G/DL (ref 32–36)
MCV RBC AUTO: 88 FL (ref 82–98)
MONOCYTES # BLD AUTO: 0.3 K/UL (ref 0.3–1)
MONOCYTES NFR BLD: 5.4 % (ref 4–15)
NEUTROPHILS # BLD AUTO: 4.7 K/UL (ref 1.8–7.7)
NEUTROPHILS NFR BLD: 79.2 % (ref 38–73)
NITRITE UR QL STRIP: NEGATIVE
NRBC BLD-RTO: 0 /100 WBC
PH UR STRIP: 6 [PH] (ref 5–8)
PLATELET # BLD AUTO: 213 K/UL (ref 150–350)
PMV BLD AUTO: 9.8 FL (ref 9.2–12.9)
POTASSIUM SERPL-SCNC: 3.8 MMOL/L (ref 3.5–5.1)
PROT SERPL-MCNC: 7.2 G/DL (ref 6–8.4)
PROT UR QL STRIP: NEGATIVE
RBC # BLD AUTO: 4.38 M/UL (ref 4–5.4)
SODIUM SERPL-SCNC: 141 MMOL/L (ref 136–145)
SP GR UR STRIP: >=1.03 (ref 1–1.03)
URN SPEC COLLECT METH UR: ABNORMAL
UROBILINOGEN UR STRIP-ACNC: NEGATIVE EU/DL
WBC # BLD AUTO: 5.93 K/UL (ref 3.9–12.7)

## 2020-01-30 PROCEDURE — 96365 THER/PROPH/DIAG IV INF INIT: CPT

## 2020-01-30 PROCEDURE — 74019 RADEX ABDOMEN 2 VIEWS: CPT | Mod: TC,FY

## 2020-01-30 PROCEDURE — 94640 AIRWAY INHALATION TREATMENT: CPT

## 2020-01-30 PROCEDURE — 82150 ASSAY OF AMYLASE: CPT

## 2020-01-30 PROCEDURE — 63600175 PHARM REV CODE 636 W HCPCS: Performed by: INTERNAL MEDICINE

## 2020-01-30 PROCEDURE — 96372 THER/PROPH/DIAG INJ SC/IM: CPT

## 2020-01-30 PROCEDURE — 96367 TX/PROPH/DG ADDL SEQ IV INF: CPT

## 2020-01-30 PROCEDURE — 99220 PR INITIAL OBSERVATION CARE,LEVL III: CPT | Mod: ,,, | Performed by: INTERNAL MEDICINE

## 2020-01-30 PROCEDURE — 36415 COLL VENOUS BLD VENIPUNCTURE: CPT

## 2020-01-30 PROCEDURE — 25000242 PHARM REV CODE 250 ALT 637 W/ HCPCS: Performed by: INTERNAL MEDICINE

## 2020-01-30 PROCEDURE — G0378 HOSPITAL OBSERVATION PER HR: HCPCS

## 2020-01-30 PROCEDURE — S0030 INJECTION, METRONIDAZOLE: HCPCS | Performed by: INTERNAL MEDICINE

## 2020-01-30 PROCEDURE — 85025 COMPLETE CBC W/AUTO DIFF WBC: CPT

## 2020-01-30 PROCEDURE — 96366 THER/PROPH/DIAG IV INF ADDON: CPT

## 2020-01-30 PROCEDURE — 80053 COMPREHEN METABOLIC PANEL: CPT

## 2020-01-30 PROCEDURE — 74019 XR ABDOMEN FLAT AND ERECT: ICD-10-PCS | Mod: 26,,, | Performed by: RADIOLOGY

## 2020-01-30 PROCEDURE — 96376 TX/PRO/DX INJ SAME DRUG ADON: CPT

## 2020-01-30 PROCEDURE — 87040 BLOOD CULTURE FOR BACTERIA: CPT

## 2020-01-30 PROCEDURE — 25000003 PHARM REV CODE 250: Performed by: INTERNAL MEDICINE

## 2020-01-30 PROCEDURE — 74019 RADEX ABDOMEN 2 VIEWS: CPT | Mod: 26,,, | Performed by: RADIOLOGY

## 2020-01-30 PROCEDURE — 99220 PR INITIAL OBSERVATION CARE,LEVL III: ICD-10-PCS | Mod: ,,, | Performed by: INTERNAL MEDICINE

## 2020-01-30 PROCEDURE — 99214 OFFICE O/P EST MOD 30 MIN: CPT | Mod: S$GLB,,, | Performed by: FAMILY MEDICINE

## 2020-01-30 PROCEDURE — 99214 PR OFFICE/OUTPT VISIT, EST, LEVL IV, 30-39 MIN: ICD-10-PCS | Mod: S$GLB,,, | Performed by: FAMILY MEDICINE

## 2020-01-30 PROCEDURE — 25000003 PHARM REV CODE 250

## 2020-01-30 PROCEDURE — G0379 DIRECT REFER HOSPITAL OBSERV: HCPCS

## 2020-01-30 PROCEDURE — 81003 URINALYSIS AUTO W/O SCOPE: CPT

## 2020-01-30 PROCEDURE — 96361 HYDRATE IV INFUSION ADD-ON: CPT

## 2020-01-30 PROCEDURE — 83690 ASSAY OF LIPASE: CPT

## 2020-01-30 PROCEDURE — 94761 N-INVAS EAR/PLS OXIMETRY MLT: CPT

## 2020-01-30 PROCEDURE — 96375 TX/PRO/DX INJ NEW DRUG ADDON: CPT

## 2020-01-30 RX ORDER — ACETAMINOPHEN 325 MG/1
650 TABLET ORAL EVERY 4 HOURS PRN
Status: DISCONTINUED | OUTPATIENT
Start: 2020-01-30 | End: 2020-02-03 | Stop reason: HOSPADM

## 2020-01-30 RX ORDER — MORPHINE SULFATE 4 MG/ML
2 INJECTION, SOLUTION INTRAMUSCULAR; INTRAVENOUS EVERY 4 HOURS PRN
Status: DISCONTINUED | OUTPATIENT
Start: 2020-01-30 | End: 2020-02-03 | Stop reason: HOSPADM

## 2020-01-30 RX ORDER — IPRATROPIUM BROMIDE AND ALBUTEROL SULFATE 2.5; .5 MG/3ML; MG/3ML
3 SOLUTION RESPIRATORY (INHALATION) EVERY 4 HOURS
Status: DISCONTINUED | OUTPATIENT
Start: 2020-01-30 | End: 2020-02-03 | Stop reason: HOSPADM

## 2020-01-30 RX ORDER — SODIUM CHLORIDE 0.9 % (FLUSH) 0.9 %
10 SYRINGE (ML) INJECTION
Status: DISCONTINUED | OUTPATIENT
Start: 2020-01-30 | End: 2020-02-03 | Stop reason: HOSPADM

## 2020-01-30 RX ORDER — ONDANSETRON 2 MG/ML
4 INJECTION INTRAMUSCULAR; INTRAVENOUS EVERY 4 HOURS PRN
Status: DISCONTINUED | OUTPATIENT
Start: 2020-01-30 | End: 2020-02-03 | Stop reason: HOSPADM

## 2020-01-30 RX ORDER — ISOSORBIDE MONONITRATE 30 MG/1
30 TABLET, EXTENDED RELEASE ORAL DAILY
Status: DISCONTINUED | OUTPATIENT
Start: 2020-01-31 | End: 2020-02-03 | Stop reason: HOSPADM

## 2020-01-30 RX ORDER — NITROGLYCERIN 0.4 MG/1
0.4 TABLET SUBLINGUAL EVERY 5 MIN PRN
Status: DISCONTINUED | OUTPATIENT
Start: 2020-01-30 | End: 2020-02-03 | Stop reason: HOSPADM

## 2020-01-30 RX ORDER — HEPARIN SODIUM 5000 [USP'U]/ML
5000 INJECTION, SOLUTION INTRAVENOUS; SUBCUTANEOUS EVERY 8 HOURS
Status: DISCONTINUED | OUTPATIENT
Start: 2020-01-30 | End: 2020-02-03 | Stop reason: HOSPADM

## 2020-01-30 RX ORDER — PANTOPRAZOLE SODIUM 40 MG/10ML
40 INJECTION, POWDER, LYOPHILIZED, FOR SOLUTION INTRAVENOUS DAILY
Status: DISCONTINUED | OUTPATIENT
Start: 2020-01-31 | End: 2020-02-03 | Stop reason: HOSPADM

## 2020-01-30 RX ORDER — CIPROFLOXACIN 2 MG/ML
400 INJECTION, SOLUTION INTRAVENOUS
Status: DISCONTINUED | OUTPATIENT
Start: 2020-01-30 | End: 2020-02-03 | Stop reason: HOSPADM

## 2020-01-30 RX ORDER — TIOTROPIUM BROMIDE 18 UG/1
18 CAPSULE ORAL; RESPIRATORY (INHALATION) DAILY
Status: DISCONTINUED | OUTPATIENT
Start: 2020-01-31 | End: 2020-02-03 | Stop reason: HOSPADM

## 2020-01-30 RX ORDER — TIZANIDINE 4 MG/1
4 TABLET ORAL EVERY 8 HOURS
Status: DISCONTINUED | OUTPATIENT
Start: 2020-01-30 | End: 2020-02-03 | Stop reason: HOSPADM

## 2020-01-30 RX ORDER — SODIUM CHLORIDE 9 MG/ML
INJECTION, SOLUTION INTRAVENOUS CONTINUOUS
Status: DISCONTINUED | OUTPATIENT
Start: 2020-01-30 | End: 2020-02-03 | Stop reason: HOSPADM

## 2020-01-30 RX ORDER — METRONIDAZOLE 500 MG/100ML
500 INJECTION, SOLUTION INTRAVENOUS
Status: DISCONTINUED | OUTPATIENT
Start: 2020-01-30 | End: 2020-01-31

## 2020-01-30 RX ORDER — GABAPENTIN 300 MG/1
300 CAPSULE ORAL 3 TIMES DAILY
Status: DISCONTINUED | OUTPATIENT
Start: 2020-01-30 | End: 2020-02-03 | Stop reason: HOSPADM

## 2020-01-30 RX ORDER — ASPIRIN 325 MG
325 TABLET ORAL DAILY
Status: DISCONTINUED | OUTPATIENT
Start: 2020-01-31 | End: 2020-02-03 | Stop reason: HOSPADM

## 2020-01-30 RX ORDER — LISINOPRIL 2.5 MG/1
5 TABLET ORAL DAILY
Status: DISCONTINUED | OUTPATIENT
Start: 2020-01-31 | End: 2020-02-03 | Stop reason: HOSPADM

## 2020-01-30 RX ORDER — LISINOPRIL 2.5 MG/1
TABLET ORAL
Status: COMPLETED
Start: 2020-01-30 | End: 2020-01-30

## 2020-01-30 RX ADMIN — MORPHINE SULFATE 2 MG: 4 INJECTION, SOLUTION INTRAMUSCULAR; INTRAVENOUS at 08:01

## 2020-01-30 RX ADMIN — ONDANSETRON 4 MG: 2 INJECTION INTRAMUSCULAR; INTRAVENOUS at 08:01

## 2020-01-30 RX ADMIN — SODIUM CHLORIDE: 0.9 INJECTION, SOLUTION INTRAVENOUS at 06:01

## 2020-01-30 RX ADMIN — MORPHINE SULFATE 2 MG: 4 INJECTION, SOLUTION INTRAMUSCULAR; INTRAVENOUS at 04:01

## 2020-01-30 RX ADMIN — HEPARIN SODIUM 5000 UNITS: 5000 INJECTION, SOLUTION INTRAVENOUS; SUBCUTANEOUS at 09:01

## 2020-01-30 RX ADMIN — METRONIDAZOLE 500 MG: 500 INJECTION, SOLUTION INTRAVENOUS at 08:01

## 2020-01-30 RX ADMIN — SODIUM CHLORIDE 1000 ML: 0.9 INJECTION, SOLUTION INTRAVENOUS at 04:01

## 2020-01-30 RX ADMIN — ONDANSETRON 4 MG: 2 INJECTION INTRAMUSCULAR; INTRAVENOUS at 04:01

## 2020-01-30 RX ADMIN — TIZANIDINE 4 MG: 4 TABLET ORAL at 09:01

## 2020-01-30 RX ADMIN — LISINOPRIL 5 MG: 2.5 TABLET ORAL at 04:01

## 2020-01-30 RX ADMIN — CIPROFLOXACIN 400 MG: 2 INJECTION, SOLUTION INTRAVENOUS at 06:01

## 2020-01-30 RX ADMIN — GABAPENTIN 300 MG: 300 CAPSULE ORAL at 09:01

## 2020-01-30 RX ADMIN — IPRATROPIUM BROMIDE AND ALBUTEROL SULFATE 3 ML: .5; 3 SOLUTION RESPIRATORY (INHALATION) at 05:01

## 2020-01-30 NOTE — ASSESSMENT & PLAN NOTE
01/30/2020:  · Admit to medical-surgical unit and begin IV fluids  · Obtain labs to include CBC CMP amylase lipase and urinalysis.  · Begin empiric antibiotics of ciprofloxacin 400 mg IV twice daily and metronidazole 500 mg 3 times daily  · Treat symptomatically as needed with Zofran and pain medicine as needed.  · Rehydrate overnight and plan CT scan of the abdomen pelvis with contrast in the a.m..

## 2020-01-30 NOTE — PROGRESS NOTES
[facility]    Subjective      Ms. Dotson is a 54 y.o. female who presents to clinic with complaints of vomiting.     Patient was seen yesterday in clinic with complaints of nausea and vomiting.   Rapid flu was obtain yesterday and was negative. Given zofran in clinic and script as well.   She states that the medication is not helping.   She has not been able to keep anything down by mouth for the past 2 days.   Reports throwing up green yellow color emesis.   Admits to chills and generalized abdominal pain.   Denies fevers.   No known sick contacts.     KUB was normal.   CMP revealed elevated BUN and serum creatinine at 23 and 1.5. Patient's normal creatinine is around 0.9. Likely secondary to dehydration.   CBC WNL.       PMH Clara has a past medical history of Anemia, Estrogen receptor negative status (ER-) (5/15/2017), Malignant neoplasm of right breast (5/15/2017), and Skin cancer.   PSXH Clara has a past surgical history that includes melanoma surgery; Bilateral mastectomy; tummy tuck; Liposuction; reconstructive breast ; Neck surgery; Cholecystectomy; Hysterectomy; colon polyp removal; and Esophagogastroduodenoscopy (N/A, 1/14/2020).    Clara's family history includes Cancer in her father and mother; Diabetes in her mother; Hypertension in her father and mother.    Clara reports that she quit smoking about 6 years ago. She has never used smokeless tobacco. She reports that she does not drink alcohol or use drugs.   FABIENNE Elizabeth is allergic to adhesive and pcn [penicillins].   MED Clara has a current medication list which includes the following prescription(s): ascorbic acid (vitamin c), aspirin, enalapril, ezetimibe, gabapentin, hydrocodone-acetaminophen, isosorbide mononitrate, nitroglycerin, omeprazole, ondansetron, tizanidine, ventolin hfa, vitamin d, vitamin e, and tiotropium.     Review of Systems   Constitutional: Positive for activity change, appetite change, chills and  "fatigue. Negative for fever.   HENT: Negative for congestion, postnasal drip, rhinorrhea, sinus pressure and sore throat.    Eyes: Negative for visual disturbance.   Respiratory: Negative for cough and shortness of breath.    Cardiovascular: Negative for chest pain.   Gastrointestinal: Positive for abdominal pain, nausea and vomiting. Negative for constipation and diarrhea.   Genitourinary: Negative for difficulty urinating and dysuria.   Musculoskeletal: Negative for myalgias.   Neurological: Negative for dizziness, syncope and numbness.   Psychiatric/Behavioral: Negative for confusion.     Objective     BP (!) 155/81   Pulse 79   Temp 98.4 °F (36.9 °C) (Oral)   Resp 17   Ht 5' 4" (1.626 m)   Wt 59.9 kg (132 lb)   BMI 22.66 kg/m²     Physical Exam   Constitutional: She appears distressed.   Ill appearing    HENT:   Head: Normocephalic and atraumatic.   Dry MM   Cardiovascular: Normal rate, regular rhythm, normal heart sounds and intact distal pulses. Exam reveals no friction rub.   No murmur heard.  Pulmonary/Chest: Effort normal and breath sounds normal. No stridor. She has no wheezes. She has no rales.   Abdominal: Soft. She exhibits no distension and no mass. There is tenderness. There is guarding. There is no rebound.   Decreased bowel sounds  Generalized TTP  Rebound in the LLQ and umbilical region   Neurological: She is alert.   Skin: Skin is warm. Capillary refill takes less than 2 seconds.   Vitals reviewed.     Assessment/Plan     Clara was seen today for intractable N/V.    Diagnoses and all orders for this visit:    Intractable nausea and vomiting    Decreased oral intake    -unable to tolerate PO intake and intractable N/V even with PO meds. Kidney function elevated from patient's baseline likely secondary to dehydration. Discussed with on call hospitalist for admission. Will accept    No follow-ups on file.    Future Appointments   Date Time Provider Department Center   2/11/2020 11:00 AM " Victor Hugo Shah [facility]   3/30/2020  2:00 PM Israel Neely MD [facility] HEM ONC [facility] Hi   4/17/2020  2:00 PM Chai Adamson MD [facility] C   4/20/2020  9:00 AM Bayron Adamson MD [facility] GASTRO [facility]       Kevin Padilla MD  Family Medicine  [facility]

## 2020-01-30 NOTE — PLAN OF CARE
01/30/20 1513   Discharge Assessment   Assessment Type Discharge Planning Assessment   Confirmed/corrected address and phone number on facesheet? Yes   Assessment information obtained from? Patient   Expected Length of Stay (days) 2   Prior to hospitilization cognitive status: Alert/Oriented   Prior to hospitalization functional status: Independent   Current cognitive status: Alert/Oriented   Current Functional Status: Independent   Lives With child(larisa), adult  (Lives with son and daughter in law)   Able to Return to Prior Arrangements yes   Is patient able to care for self after discharge? Yes   Who are your caregiver(s) and their phone number(s)? Kayce Adamson [phone]   Patient's perception of discharge disposition home or selfcare   Readmission Within the Last 30 Days no previous admission in last 30 days   Patient currently being followed by outpatient case management? No   Patient currently receives any other outside agency services? No   Equipment Currently Used at Home none   Do you have any problems affording any of your prescribed medications? No   Is the patient taking medications as prescribed? yes   Does the patient have transportation home? Yes   Transportation Anticipated family or friend will provide   Does the patient receive services at the [facility] Clinic? No   Discharge Plan A Home with family   DME Needed Upon Discharge  none   Patient/Family in Agreement with Plan yes     Patient resting in bed with family at bedside.  Denies use of home health services nor having/using  any dme.  States she lives with her son and daughter in law and lists her friend Kayce Adamson as caregiver when needed ([phone]).  Denies any needs at this time.  Case Management will continue to follow for further needs.

## 2020-01-30 NOTE — SUBJECTIVE & OBJECTIVE
Past Medical History:   Diagnosis Date    Anemia     Estrogen receptor negative status (ER-) 5/15/2017    Malignant neoplasm of right breast 5/15/2017    Skin cancer        Past Surgical History:   Procedure Laterality Date    BILATERAL MASTECTOMY      CHOLECYSTECTOMY      colon polyp removal      ESOPHAGOGASTRODUODENOSCOPY N/A 1/14/2020    Procedure: EGD (ESOPHAGOGASTRODUODENOSCOPY);  Surgeon: Bayron Adamson MD;  Location: [facility];  Service: Endoscopy;  Laterality: N/A;    HYSTERECTOMY      LIPOSUCTION      melanoma surgery      NECK SURGERY      reconstructive breast       tummy kevin         Review of patient's allergies indicates:   Allergen Reactions    Adhesive      Silk tape (white tape)     Pcn [penicillins] Hives       No current facility-administered medications on file prior to encounter.      Current Outpatient Medications on File Prior to Encounter   Medication Sig    ascorbic acid, vitamin C, (VITAMIN C) 100 MG tablet Take 100 mg by mouth once daily.    aspirin 325 MG tablet Take 325 mg by mouth once daily.    enalapril (VASOTEC) 5 MG tablet TAKE ONE TABLET BY MOUTH EVERY DAY **THANK YOU**    ezetimibe (ZETIA) 10 mg tablet TAKE ONE TABLET BY MOUTH EVERY DAY **THANK YOU**    gabapentin (NEURONTIN) 300 MG capsule TAKE ONE CAPSULE BY MOUTH THREE TIMES DAILY **THANK YOU**    hydrocodone-acetaminophen 10-325mg (NORCO)  mg Tab every 6 (six) hours as needed.     isosorbide mononitrate (IMDUR) 60 MG 24 hr tablet TAKE ONE TABLET BY MOUTH EVERY DAY **THANK YOU**    omeprazole (PRILOSEC) 20 MG capsule Take 1 capsule (20 mg total) by mouth once daily.    ondansetron (ZOFRAN) 8 MG tablet Take 1 tablet (8 mg total) by mouth every 8 (eight) hours as needed for Nausea.    tiZANidine (ZANAFLEX) 4 MG tablet Take 4 mg by mouth every 6 (six) hours as needed.    VENTOLIN HFA 90 mcg/actuation inhaler inhale TWO puffs into lungs EVERY 6 HOURS AS NEEDED FOR WHEEZING THANK YOU    vitamin D  (VITAMIN D3) 1000 units Tab Take 1,000 Units by mouth once daily.    vitamin E 1000 UNIT capsule Take 1,000 Units by mouth once daily.    nitroGLYCERIN (NITROSTAT) 0.4 MG SL tablet DISSOLVE 1 TABLET UNDER THE TONGUE FOR CHEST PAIN MAY REPEAT EVERY FIVE MINUTES FOR NO MORE THAN 3 DOSES **THANK YOU**    tiotropium (SPIRIVA WITH HANDIHALER) 18 mcg inhalation capsule Inhale 1 capsule (18 mcg total) into the lungs once daily. Controller     Family History     Problem Relation (Age of Onset)    Cancer Mother, Father    Diabetes Mother    Hypertension Mother, Father        Tobacco Use    Smoking status: Former Smoker     Last attempt to quit:      Years since quittin.0    Smokeless tobacco: Never Used   Substance and Sexual Activity    Alcohol use: No    Drug use: No    Sexual activity: Not on file     Review of Systems   Constitutional: Positive for activity change and fatigue. Negative for appetite change and fever.   HENT: Negative for congestion, ear discharge, mouth sores, nosebleeds, rhinorrhea, sinus pressure, sinus pain and tinnitus.    Eyes: Negative.  Negative for pain, redness and itching.   Respiratory: Negative for apnea, cough, choking, chest tightness, shortness of breath, wheezing and stridor.    Cardiovascular: Negative for chest pain, palpitations and leg swelling.   Gastrointestinal: Positive for abdominal distention, abdominal pain, diarrhea, nausea and vomiting. Negative for anal bleeding, blood in stool and constipation.   Endocrine: Negative.    Genitourinary: Negative for difficulty urinating, flank pain, frequency and urgency.   Musculoskeletal: Negative for arthralgias, back pain, gait problem and myalgias.   Skin: Negative for color change and pallor.   Allergic/Immunologic: Negative.    Neurological: Positive for weakness and light-headedness. Negative for dizziness, facial asymmetry and headaches.   Hematological: Negative for adenopathy. Does not bruise/bleed easily.    Psychiatric/Behavioral: The patient is nervous/anxious.      Objective:     Vital Signs (Most Recent):  Temp: 96.6 °F (35.9 °C) (01/30/20 1500)  Pulse: 71 (01/30/20 1500)  Resp: 18 (01/30/20 1500)  BP: (!) 199/84 (01/30/20 1500)  SpO2: 98 % (01/30/20 1500) Vital Signs (24h Range):  Temp:  [96.6 °F (35.9 °C)-98.4 °F (36.9 °C)] 96.6 °F (35.9 °C)  Pulse:  [71-79] 71  Resp:  [17-18] 18  SpO2:  [98 %] 98 %  BP: (155-199)/(81-84) 199/84     Weight: 59.9 kg (132 lb)  Body mass index is 22.66 kg/m².    Physical Exam   Constitutional: She is oriented to person, place, and time. She appears well-developed and well-nourished.   HENT:   Head: Normocephalic and atraumatic.   Eyes: Pupils are equal, round, and reactive to light. EOM are normal.   Neck: Normal range of motion. Neck supple. No tracheal deviation present. No thyromegaly present.   Cardiovascular: Normal rate, regular rhythm and normal heart sounds.   Pulmonary/Chest: Effort normal and breath sounds normal.   Abdominal: Soft. Bowel sounds are normal. She exhibits distension. There is tenderness. There is guarding. There is no rebound.   Musculoskeletal: Normal range of motion.   Lymphadenopathy:     She has no cervical adenopathy.   Neurological: She is alert and oriented to person, place, and time.   Skin: Skin is warm and dry. Capillary refill takes less than 2 seconds.   Psychiatric: She has a normal mood and affect. Her behavior is normal. Judgment and thought content normal.   Nursing note and vitals reviewed.        CRANIAL NERVES     CN III, IV, VI   Pupils are equal, round, and reactive to light.  Extraocular motions are normal.        Significant Labs:   Recent Lab Results       01/30/20  1246        Albumin 4.4     Alkaline Phosphatase 72     ALT 40     Anion Gap 9     AST 26     Baso # 0.03     Basophil% 0.5     BILIRUBIN TOTAL 0.6  Comment:  For infants and newborns, interpretation of results should be based  on gestational age, weight and in agreement  with clinical  observations.  Premature Infant recommended reference ranges:  Up to 24 hours.............<8.0 mg/dL  Up to 48 hours............<12.0 mg/dL  3-5 days..................<15.0 mg/dL  6-29 days.................<15.0 mg/dL       BUN, Bld 23     Calcium 9.1     Chloride 108     CO2 26     Creatinine 1.5     Differential Method Automated     eGFR if African American 44.9     eGFR if non  38.9  Comment:  Calculation used to obtain the estimated glomerular filtration  rate (eGFR) is the CKD-EPI equation.        Eos # 0.1     Eosinophil% 0.9     Glucose 118     Gran # (ANC) 4.7     Gran% 72.4     Hematocrit 40.2     Hemoglobin 13.3     Immature Grans (Abs) 0.03  Comment:  Mild elevation in immature granulocytes is non specific and   can be seen in a variety of conditions including stress response,   acute inflammation, trauma and pregnancy. Correlation with other   laboratory and clinical findings is essential.       Immature Granulocytes 0.5     Lymph # 1.1     Lymph% 17.0     MCH 29.4     MCHC 33.1     MCV 89     Mono # 0.6     Mono% 8.7     MPV 9.7     nRBC 0     Platelets 234     Potassium 4.2     PROTEIN TOTAL 7.4     RBC 4.53     RDW 12.7     Sodium 143     WBC 6.43         All pertinent labs within the past 24 hours have been reviewed.    Significant Imaging: I have reviewed and interpreted all pertinent imaging results/findings within the past 24 hours.

## 2020-01-30 NOTE — HPI
Patient is a 54-year-old female that visited her primary care physician today and was complaining of 2 day history of nausea vomiting diarrhea and abdominal pain. Patient stated that she made some CABG on Tuesday and approximately 4-6 hours after she ate this she began to have nausea and vomiting with abdominal pain and subsequently diarrhea.  She denies any fever but states that she has had subjective chills and mid to lower abdominal pain since.  She presented to the Primary Care Clinic today stating that she could not keep anything down and appeared clinically dehydrated.  Patient has a past medical history significant for breast cancer, anemia and skin cancer.  Past surgical history includes cholecystectomy, hysterectomy and bilateral mastectomy.  Patient admitted for acute kidney injury and dehydration.

## 2020-01-30 NOTE — TELEPHONE ENCOUNTER
C/o vomiting, diarrhea, stomach pain, states Bp 194/109,  States saw Provider @ [facility] yesterday  And received zofran and nothing has helped. Emesis is light yellow/greenish, states nothing is staying down. Encouraged to seek treatment either at clinic or ER for hydration & to lower BP & patient agreed. Appointment made for [facility] for !st available.

## 2020-01-30 NOTE — PLAN OF CARE
01/30/20 1517   ALATORRE Message   Medicare Outpatient and Observation Notification regarding financial responsibility Given to patient/caregiver;Explained to patient/caregiver;Signed/date by patient/caregiver   Date ALATORRE was signed 01/30/20   Time ALATORRE was signed 1518

## 2020-01-30 NOTE — H&P
[facility]  History & Physical    Patient Name: Clara Dotson  MRN: [id number]  Admission Date: 1/30/2020  Attending Physician: Wyatt Gurrola MD  Primary Care Provider: Chai Adamson MD         Patient information was obtained from patient and ER records.     Subjective:     Principal Problem:Acute kidney injury    Chief Complaint:   Chief Complaint   Patient presents with    Nausea    Emesis    Diarrhea    Abdominal Pain        HPI: Patient is a 54-year-old female that visited her primary care physician today and was complaining of 2 day history of nausea vomiting diarrhea and abdominal pain. Patient stated that she made some CABG on Tuesday and approximately 4-6 hours after she ate this she began to have nausea and vomiting with abdominal pain and subsequently diarrhea.  She denies any fever but states that she has had subjective chills and mid to lower abdominal pain since.  She presented to the Primary Care Clinic today stating that she could not keep anything down and appeared clinically dehydrated.  Patient has a past medical history significant for breast cancer, anemia and skin cancer.  Past surgical history includes cholecystectomy, hysterectomy and bilateral mastectomy.  Patient admitted for acute kidney injury and dehydration.    Past Medical History:   Diagnosis Date    Anemia     Estrogen receptor negative status (ER-) 5/15/2017    Malignant neoplasm of right breast 5/15/2017    Skin cancer        Past Surgical History:   Procedure Laterality Date    BILATERAL MASTECTOMY      CHOLECYSTECTOMY      colon polyp removal      ESOPHAGOGASTRODUODENOSCOPY N/A 1/14/2020    Procedure: EGD (ESOPHAGOGASTRODUODENOSCOPY);  Surgeon: Bayron Adamson MD;  Location: [facility];  Service: Endoscopy;  Laterality: N/A;    HYSTERECTOMY      LIPOSUCTION      melanoma surgery      NECK SURGERY      reconstructive breast       tummy tuck         Review  of patient's allergies indicates:   Allergen Reactions    Adhesive      Silk tape (white tape)     Pcn [penicillins] Hives       No current facility-administered medications on file prior to encounter.      Current Outpatient Medications on File Prior to Encounter   Medication Sig    ascorbic acid, vitamin C, (VITAMIN C) 100 MG tablet Take 100 mg by mouth once daily.    aspirin 325 MG tablet Take 325 mg by mouth once daily.    enalapril (VASOTEC) 5 MG tablet TAKE ONE TABLET BY MOUTH EVERY DAY **THANK YOU**    ezetimibe (ZETIA) 10 mg tablet TAKE ONE TABLET BY MOUTH EVERY DAY **THANK YOU**    gabapentin (NEURONTIN) 300 MG capsule TAKE ONE CAPSULE BY MOUTH THREE TIMES DAILY **THANK YOU**    hydrocodone-acetaminophen 10-325mg (NORCO)  mg Tab every 6 (six) hours as needed.     isosorbide mononitrate (IMDUR) 60 MG 24 hr tablet TAKE ONE TABLET BY MOUTH EVERY DAY **THANK YOU**    omeprazole (PRILOSEC) 20 MG capsule Take 1 capsule (20 mg total) by mouth once daily.    ondansetron (ZOFRAN) 8 MG tablet Take 1 tablet (8 mg total) by mouth every 8 (eight) hours as needed for Nausea.    tiZANidine (ZANAFLEX) 4 MG tablet Take 4 mg by mouth every 6 (six) hours as needed.    VENTOLIN HFA 90 mcg/actuation inhaler inhale TWO puffs into lungs EVERY 6 HOURS AS NEEDED FOR WHEEZING THANK YOU    vitamin D (VITAMIN D3) 1000 units Tab Take 1,000 Units by mouth once daily.    vitamin E 1000 UNIT capsule Take 1,000 Units by mouth once daily.    nitroGLYCERIN (NITROSTAT) 0.4 MG SL tablet DISSOLVE 1 TABLET UNDER THE TONGUE FOR CHEST PAIN MAY REPEAT EVERY FIVE MINUTES FOR NO MORE THAN 3 DOSES **THANK YOU**    tiotropium (SPIRIVA WITH HANDIHALER) 18 mcg inhalation capsule Inhale 1 capsule (18 mcg total) into the lungs once daily. Controller     Family History     Problem Relation (Age of Onset)    Cancer Mother, Father    Diabetes Mother    Hypertension Mother, Father        Tobacco Use    Smoking status: Former Smoker      Last attempt to quit: 2014     Years since quittin.0    Smokeless tobacco: Never Used   Substance and Sexual Activity    Alcohol use: No    Drug use: No    Sexual activity: Not on file     Review of Systems   Constitutional: Positive for activity change and fatigue. Negative for appetite change and fever.   HENT: Negative for congestion, ear discharge, mouth sores, nosebleeds, rhinorrhea, sinus pressure, sinus pain and tinnitus.    Eyes: Negative.  Negative for pain, redness and itching.   Respiratory: Negative for apnea, cough, choking, chest tightness, shortness of breath, wheezing and stridor.    Cardiovascular: Negative for chest pain, palpitations and leg swelling.   Gastrointestinal: Positive for abdominal distention, abdominal pain, diarrhea, nausea and vomiting. Negative for anal bleeding, blood in stool and constipation.   Endocrine: Negative.    Genitourinary: Negative for difficulty urinating, flank pain, frequency and urgency.   Musculoskeletal: Negative for arthralgias, back pain, gait problem and myalgias.   Skin: Negative for color change and pallor.   Allergic/Immunologic: Negative.    Neurological: Positive for weakness and light-headedness. Negative for dizziness, facial asymmetry and headaches.   Hematological: Negative for adenopathy. Does not bruise/bleed easily.   Psychiatric/Behavioral: The patient is nervous/anxious.      Objective:     Vital Signs (Most Recent):  Temp: 96.6 °F (35.9 °C) (20 1500)  Pulse: 71 (20 1500)  Resp: 18 (20 1500)  BP: (!) 199/84 (20 1500)  SpO2: 98 % (20 1500) Vital Signs (24h Range):  Temp:  [96.6 °F (35.9 °C)-98.4 °F (36.9 °C)] 96.6 °F (35.9 °C)  Pulse:  [71-79] 71  Resp:  [17-18] 18  SpO2:  [98 %] 98 %  BP: (155-199)/(81-84) 199/84     Weight: 59.9 kg (132 lb)  Body mass index is 22.66 kg/m².    Physical Exam   Constitutional: She is oriented to person, place, and time. She appears well-developed and well-nourished.   HENT:    Head: Normocephalic and atraumatic.   Eyes: Pupils are equal, round, and reactive to light. EOM are normal.   Neck: Normal range of motion. Neck supple. No tracheal deviation present. No thyromegaly present.   Cardiovascular: Normal rate, regular rhythm and normal heart sounds.   Pulmonary/Chest: Effort normal and breath sounds normal.   Abdominal: Soft. Bowel sounds are normal. She exhibits distension. There is tenderness. There is guarding. There is no rebound.   Musculoskeletal: Normal range of motion.   Lymphadenopathy:     She has no cervical adenopathy.   Neurological: She is alert and oriented to person, place, and time.   Skin: Skin is warm and dry. Capillary refill takes less than 2 seconds.   Psychiatric: She has a normal mood and affect. Her behavior is normal. Judgment and thought content normal.   Nursing note and vitals reviewed.        CRANIAL NERVES     CN III, IV, VI   Pupils are equal, round, and reactive to light.  Extraocular motions are normal.        Significant Labs:   Recent Lab Results       01/30/20  1246        Albumin 4.4     Alkaline Phosphatase 72     ALT 40     Anion Gap 9     AST 26     Baso # 0.03     Basophil% 0.5     BILIRUBIN TOTAL 0.6  Comment:  For infants and newborns, interpretation of results should be based  on gestational age, weight and in agreement with clinical  observations.  Premature Infant recommended reference ranges:  Up to 24 hours.............<8.0 mg/dL  Up to 48 hours............<12.0 mg/dL  3-5 days..................<15.0 mg/dL  6-29 days.................<15.0 mg/dL       BUN, Bld 23     Calcium 9.1     Chloride 108     CO2 26     Creatinine 1.5     Differential Method Automated     eGFR if African American 44.9     eGFR if non  38.9  Comment:  Calculation used to obtain the estimated glomerular filtration  rate (eGFR) is the CKD-EPI equation.        Eos # 0.1     Eosinophil% 0.9     Glucose 118     Gran # (ANC) 4.7     Gran% 72.4      Hematocrit 40.2     Hemoglobin 13.3     Immature Grans (Abs) 0.03  Comment:  Mild elevation in immature granulocytes is non specific and   can be seen in a variety of conditions including stress response,   acute inflammation, trauma and pregnancy. Correlation with other   laboratory and clinical findings is essential.       Immature Granulocytes 0.5     Lymph # 1.1     Lymph% 17.0     MCH 29.4     MCHC 33.1     MCV 89     Mono # 0.6     Mono% 8.7     MPV 9.7     nRBC 0     Platelets 234     Potassium 4.2     PROTEIN TOTAL 7.4     RBC 4.53     RDW 12.7     Sodium 143     WBC 6.43         All pertinent labs within the past 24 hours have been reviewed.    Significant Imaging: I have reviewed and interpreted all pertinent imaging results/findings within the past 24 hours.    Assessment/Plan:     * Acute kidney injury  01/30/2020:  · Admit to medical-surgical unit and begin IV fluids  · Obtain labs to include CBC CMP amylase lipase and urinalysis.  · Begin empiric antibiotics of ciprofloxacin 400 mg IV twice daily and metronidazole 500 mg 3 times daily  · Treat symptomatically as needed with Zofran and pain medicine as needed.  · Rehydrate overnight and plan CT scan of the abdomen pelvis with contrast in the a.m..        VTE Risk Mitigation (From admission, onward)         Ordered     heparin (porcine) injection 5,000 Units  Every 8 hours      01/30/20 1556     IP VTE HIGH RISK PATIENT  Once      01/30/20 1909                   Wyatt Gurrola MD  Department of Hospital Medicine   [facility]

## 2020-01-31 LAB
ALBUMIN SERPL BCP-MCNC: 2.9 G/DL (ref 3.5–5.2)
ALP SERPL-CCNC: 48 U/L (ref 55–135)
ALT SERPL W/O P-5'-P-CCNC: 28 U/L (ref 10–44)
ANION GAP SERPL CALC-SCNC: 11 MMOL/L (ref 8–16)
AST SERPL-CCNC: 25 U/L (ref 10–40)
BILIRUB SERPL-MCNC: 0.3 MG/DL (ref 0.1–1)
BUN SERPL-MCNC: 14 MG/DL (ref 6–20)
C DIFF GDH STL QL: NEGATIVE
C DIFF TOX A+B STL QL IA: NEGATIVE
CALCIUM SERPL-MCNC: 7.4 MG/DL (ref 8.7–10.5)
CHLORIDE SERPL-SCNC: 112 MMOL/L (ref 95–110)
CO2 SERPL-SCNC: 20 MMOL/L (ref 23–29)
CREAT SERPL-MCNC: 1 MG/DL (ref 0.5–1.4)
EST. GFR  (AFRICAN AMERICAN): >60 ML/MIN/1.73 M^2
EST. GFR  (NON AFRICAN AMERICAN): >60 ML/MIN/1.73 M^2
GLUCOSE SERPL-MCNC: 89 MG/DL (ref 70–110)
OB PNL STL: NEGATIVE
POTASSIUM SERPL-SCNC: 3 MMOL/L (ref 3.5–5.1)
PROT SERPL-MCNC: 5 G/DL (ref 6–8.4)
RV AG STL QL IA.RAPID: NEGATIVE
SODIUM SERPL-SCNC: 143 MMOL/L (ref 136–145)
WBC #/AREA STL HPF: NORMAL /[HPF]

## 2020-01-31 PROCEDURE — 87338 HPYLORI STOOL AG IA: CPT

## 2020-01-31 PROCEDURE — 99226 PR SUBSEQUENT OBSERVATION CARE,LEVEL III: CPT | Mod: ,,, | Performed by: INTERNAL MEDICINE

## 2020-01-31 PROCEDURE — 94640 AIRWAY INHALATION TREATMENT: CPT

## 2020-01-31 PROCEDURE — 96375 TX/PRO/DX INJ NEW DRUG ADDON: CPT

## 2020-01-31 PROCEDURE — 87449 NOS EACH ORGANISM AG IA: CPT

## 2020-01-31 PROCEDURE — 63600175 PHARM REV CODE 636 W HCPCS: Performed by: INTERNAL MEDICINE

## 2020-01-31 PROCEDURE — 25500020 PHARM REV CODE 255: Performed by: INTERNAL MEDICINE

## 2020-01-31 PROCEDURE — C9113 INJ PANTOPRAZOLE SODIUM, VIA: HCPCS | Performed by: INTERNAL MEDICINE

## 2020-01-31 PROCEDURE — G0378 HOSPITAL OBSERVATION PER HR: HCPCS

## 2020-01-31 PROCEDURE — 25000003 PHARM REV CODE 250: Performed by: INTERNAL MEDICINE

## 2020-01-31 PROCEDURE — 99226 PR SUBSEQUENT OBSERVATION CARE,LEVEL III: ICD-10-PCS | Mod: ,,, | Performed by: INTERNAL MEDICINE

## 2020-01-31 PROCEDURE — 96361 HYDRATE IV INFUSION ADD-ON: CPT

## 2020-01-31 PROCEDURE — 87329 GIARDIA AG IA: CPT

## 2020-01-31 PROCEDURE — 36415 COLL VENOUS BLD VENIPUNCTURE: CPT

## 2020-01-31 PROCEDURE — 96372 THER/PROPH/DIAG INJ SC/IM: CPT

## 2020-01-31 PROCEDURE — 96366 THER/PROPH/DIAG IV INF ADDON: CPT

## 2020-01-31 PROCEDURE — 94761 N-INVAS EAR/PLS OXIMETRY MLT: CPT

## 2020-01-31 PROCEDURE — 89055 LEUKOCYTE ASSESSMENT FECAL: CPT

## 2020-01-31 PROCEDURE — 82272 OCCULT BLD FECES 1-3 TESTS: CPT

## 2020-01-31 PROCEDURE — 80053 COMPREHEN METABOLIC PANEL: CPT

## 2020-01-31 PROCEDURE — 87425 ROTAVIRUS AG IA: CPT

## 2020-01-31 PROCEDURE — 96376 TX/PRO/DX INJ SAME DRUG ADON: CPT

## 2020-01-31 PROCEDURE — S0030 INJECTION, METRONIDAZOLE: HCPCS | Performed by: INTERNAL MEDICINE

## 2020-01-31 PROCEDURE — 25000242 PHARM REV CODE 250 ALT 637 W/ HCPCS: Performed by: INTERNAL MEDICINE

## 2020-01-31 PROCEDURE — 87324 CLOSTRIDIUM AG IA: CPT

## 2020-01-31 RX ORDER — SODIUM CHLORIDE 9 MG/ML
INJECTION, SOLUTION INTRAVENOUS ONCE
Status: COMPLETED | OUTPATIENT
Start: 2020-01-31 | End: 2020-01-31

## 2020-01-31 RX ORDER — METRONIDAZOLE 500 MG/100ML
500 INJECTION, SOLUTION INTRAVENOUS
Status: DISCONTINUED | OUTPATIENT
Start: 2020-01-31 | End: 2020-02-03 | Stop reason: HOSPADM

## 2020-01-31 RX ADMIN — IOHEXOL 75 ML: 350 INJECTION, SOLUTION INTRAVENOUS at 04:01

## 2020-01-31 RX ADMIN — TIZANIDINE 4 MG: 4 TABLET ORAL at 02:01

## 2020-01-31 RX ADMIN — SODIUM CHLORIDE: 0.9 INJECTION, SOLUTION INTRAVENOUS at 11:01

## 2020-01-31 RX ADMIN — IPRATROPIUM BROMIDE AND ALBUTEROL SULFATE 3 ML: .5; 3 SOLUTION RESPIRATORY (INHALATION) at 12:01

## 2020-01-31 RX ADMIN — IPRATROPIUM BROMIDE AND ALBUTEROL SULFATE 3 ML: .5; 3 SOLUTION RESPIRATORY (INHALATION) at 07:01

## 2020-01-31 RX ADMIN — HEPARIN SODIUM 5000 UNITS: 5000 INJECTION, SOLUTION INTRAVENOUS; SUBCUTANEOUS at 02:01

## 2020-01-31 RX ADMIN — TIZANIDINE 4 MG: 4 TABLET ORAL at 09:01

## 2020-01-31 RX ADMIN — SODIUM CHLORIDE: 0.9 INJECTION, SOLUTION INTRAVENOUS at 12:01

## 2020-01-31 RX ADMIN — IPRATROPIUM BROMIDE AND ALBUTEROL SULFATE 3 ML: .5; 3 SOLUTION RESPIRATORY (INHALATION) at 11:01

## 2020-01-31 RX ADMIN — IPRATROPIUM BROMIDE AND ALBUTEROL SULFATE 3 ML: .5; 3 SOLUTION RESPIRATORY (INHALATION) at 04:01

## 2020-01-31 RX ADMIN — GABAPENTIN 300 MG: 300 CAPSULE ORAL at 08:01

## 2020-01-31 RX ADMIN — METRONIDAZOLE 500 MG: 500 INJECTION, SOLUTION INTRAVENOUS at 04:01

## 2020-01-31 RX ADMIN — CIPROFLOXACIN 400 MG: 2 INJECTION, SOLUTION INTRAVENOUS at 05:01

## 2020-01-31 RX ADMIN — ASPIRIN 325 MG ORAL TABLET 325 MG: 325 PILL ORAL at 08:01

## 2020-01-31 RX ADMIN — HEPARIN SODIUM 5000 UNITS: 5000 INJECTION, SOLUTION INTRAVENOUS; SUBCUTANEOUS at 06:01

## 2020-01-31 RX ADMIN — METRONIDAZOLE 500 MG: 500 INJECTION, SOLUTION INTRAVENOUS at 11:01

## 2020-01-31 RX ADMIN — METRONIDAZOLE 500 MG: 500 INJECTION, SOLUTION INTRAVENOUS at 08:01

## 2020-01-31 RX ADMIN — GABAPENTIN 300 MG: 300 CAPSULE ORAL at 02:01

## 2020-01-31 RX ADMIN — CIPROFLOXACIN 400 MG: 2 INJECTION, SOLUTION INTRAVENOUS at 06:01

## 2020-01-31 RX ADMIN — IOHEXOL 15 ML: 300 INJECTION, SOLUTION INTRAVENOUS at 02:01

## 2020-01-31 RX ADMIN — SODIUM CHLORIDE: 0.9 INJECTION, SOLUTION INTRAVENOUS at 08:01

## 2020-01-31 RX ADMIN — METRONIDAZOLE 500 MG: 500 INJECTION, SOLUTION INTRAVENOUS at 03:01

## 2020-01-31 RX ADMIN — PANTOPRAZOLE SODIUM 40 MG: 40 INJECTION, POWDER, LYOPHILIZED, FOR SOLUTION INTRAVENOUS at 08:01

## 2020-01-31 RX ADMIN — TIOTROPIUM BROMIDE 18 MCG: 18 CAPSULE ORAL; RESPIRATORY (INHALATION) at 07:01

## 2020-01-31 RX ADMIN — IPRATROPIUM BROMIDE AND ALBUTEROL SULFATE 3 ML: .5; 3 SOLUTION RESPIRATORY (INHALATION) at 03:01

## 2020-01-31 RX ADMIN — SODIUM CHLORIDE: 0.9 INJECTION, SOLUTION INTRAVENOUS at 03:01

## 2020-01-31 RX ADMIN — TIZANIDINE 4 MG: 4 TABLET ORAL at 06:01

## 2020-01-31 RX ADMIN — HEPARIN SODIUM 5000 UNITS: 5000 INJECTION, SOLUTION INTRAVENOUS; SUBCUTANEOUS at 09:01

## 2020-01-31 NOTE — ASSESSMENT & PLAN NOTE
01/30/2020:  · Admit to medical-surgical unit and begin IV fluids  · Obtain labs to include CBC CMP amylase lipase and urinalysis.  · Begin empiric antibiotics of ciprofloxacin 400 mg IV twice daily and metronidazole 500 mg 3 times daily  · Treat symptomatically as needed with Zofran and pain medicine as needed.  · Rehydrate overnight and plan CT scan of the abdomen pelvis with contrast in the a.m..    01/31/2020:  · Acute kidney injury has improved.  Patient continues to complain of upper quadrants pains and epigastric pains  · Will do CT of the abdomen and pelvis with contrast this morning  · If CT scan negative will discharge in the a.m..  · Repeat labs in the a.m. to include CBC and CMP  · Treat symptomatically otherwise.

## 2020-01-31 NOTE — PLAN OF CARE
Problem: Adult Inpatient Plan of Care  Goal: Plan of Care Review  Outcome: Ongoing, Progressing     Problem: Fall Injury Risk  Goal: Absence of Fall and Fall-Related Injury  Outcome: Ongoing, Progressing     Problem: Infection  Goal: Infection Symptom Resolution  Outcome: Ongoing, Progressing

## 2020-01-31 NOTE — PLAN OF CARE
Problem: Adult Inpatient Plan of Care  Goal: Plan of Care Review  Outcome: Ongoing, Progressing  Goal: Patient-Specific Goal (Individualization)  Outcome: Ongoing, Progressing  Goal: Absence of Hospital-Acquired Illness or Injury  Outcome: Ongoing, Progressing  Goal: Optimal Comfort and Wellbeing  Outcome: Ongoing, Progressing  Goal: Readiness for Transition of Care  Outcome: Ongoing, Progressing  Goal: Rounds/Family Conference  Outcome: Ongoing, Progressing     Problem: Fall Injury Risk  Goal: Absence of Fall and Fall-Related Injury  Outcome: Ongoing, Progressing     Problem: Infection  Goal: Infection Symptom Resolution  Outcome: Ongoing, Progressing     Problem: Fluid Imbalance (Acute Kidney Injury/Impairment)  Goal: Optimal Fluid Balance  Outcome: Ongoing, Progressing     Problem: Hematologic Alteration (Acute Kidney Injury/Impairment)  Goal: Hemoglobin, Hematocrit and Platelets Within Normal Range  Outcome: Ongoing, Progressing

## 2020-01-31 NOTE — PROGRESS NOTES
[facility] Medicine  Progress Note    Patient Name: Clara Dotson  MRN: [id number]  Patient Class: OP- Observation   Admission Date: 1/30/2020  Length of Stay: 0 days  Attending Physician: Wyatt Gurorla MD  Primary Care Provider: Chai Adamson MD        Subjective:     Principal Problem:Acute kidney injury        HPI:  Patient is a 54-year-old female that visited her primary care physician today and was complaining of 2 day history of nausea vomiting diarrhea and abdominal pain. Patient stated that she made some CABG on Tuesday and approximately 4-6 hours after she ate this she began to have nausea and vomiting with abdominal pain and subsequently diarrhea.  She denies any fever but states that she has had subjective chills and mid to lower abdominal pain since.  She presented [facility] today stating that she could not keep anything down and appeared clinically dehydrated.  Patient has a past medical history significant for breast cancer, anemia and skin cancer.  Past surgical history includes cholecystectomy, hysterectomy and bilateral mastectomy.  Patient admitted for acute kidney injury and dehydration.    Overview/Hospital Course:  01/31/2020:  Patient was admitted yesterday with nausea vomiting and diarrhea.  No further diarrhea nausea or vomiting overnight.  However patient states that she feels better although she still has epigastric abdominal pain.  Patient has had a cholecystectomy in the past.  Labs this morning showed no significant abnormality except for potassium of 3.0 chloride 112 bicarb 20 and GFR greater than 60.  BUN and creatinine this morning was 14 in 1.0.  Patient will be sent for CT scan of the abdomen pelvis today with contrast.  This will be followed and if normal patient will be discharged tomorrow with diagnosis of acute gastroenteritis.    Interval History:     Review of Systems   Constitutional: Positive for fatigue.  Negative for activity change, appetite change and fever.   HENT: Negative for congestion, ear discharge, mouth sores, nosebleeds, rhinorrhea, sinus pressure, sinus pain and tinnitus.    Eyes: Negative.  Negative for pain, redness and itching.   Respiratory: Positive for cough, chest tightness and shortness of breath. Negative for apnea, choking, wheezing and stridor.    Cardiovascular: Negative for chest pain, palpitations and leg swelling.   Gastrointestinal: Positive for abdominal distention, abdominal pain, diarrhea, nausea and vomiting. Negative for anal bleeding, blood in stool and constipation.   Endocrine: Negative.    Genitourinary: Negative for difficulty urinating, flank pain, frequency and urgency.   Musculoskeletal: Negative for arthralgias, back pain, gait problem and myalgias.   Skin: Negative for color change and pallor.   Allergic/Immunologic: Negative.    Neurological: Positive for weakness. Negative for dizziness, facial asymmetry, light-headedness and headaches.   Hematological: Negative for adenopathy. Does not bruise/bleed easily.   Psychiatric/Behavioral: The patient is nervous/anxious.      Objective:     Vital Signs (Most Recent):  Temp: 97.3 °F (36.3 °C) (01/31/20 1405)  Pulse: 76 (01/31/20 1526)  Resp: 16 (01/31/20 1526)  BP: 136/76 (01/31/20 1416)  SpO2: 96 % (01/31/20 1526) Vital Signs (24h Range):  Temp:  [97.3 °F (36.3 °C)-98.9 °F (37.2 °C)] 97.3 °F (36.3 °C)  Pulse:  [66-92] 76  Resp:  [14-18] 16  SpO2:  [95 %-98 %] 96 %  BP: ()/(48-91) 136/76     Weight: 59.9 kg (132 lb)  Body mass index is 22.66 kg/m².    Intake/Output Summary (Last 24 hours) at 1/31/2020 1545  Last data filed at 1/31/2020 1400  Gross per 24 hour   Intake 2615 ml   Output 1500 ml   Net 1115 ml      Physical Exam   Constitutional: She is oriented to person, place, and time. She appears well-developed and well-nourished.   HENT:   Head: Normocephalic and atraumatic.   Eyes: Pupils are equal, round, and reactive  to light. EOM are normal.   Neck: Normal range of motion. Neck supple. No tracheal deviation present. No thyromegaly present.   Cardiovascular: Normal rate, regular rhythm and normal heart sounds.   Pulmonary/Chest: Effort normal and breath sounds normal. No respiratory distress.   Abdominal: Soft. Bowel sounds are normal. She exhibits distension. There is tenderness. There is no rebound and no guarding.   Musculoskeletal: Normal range of motion.   Lymphadenopathy:     She has no cervical adenopathy.   Neurological: She is alert and oriented to person, place, and time.   Skin: Skin is warm and dry. Capillary refill takes less than 2 seconds.   Psychiatric: She has a normal mood and affect. Her behavior is normal. Judgment and thought content normal.   Nursing note and vitals reviewed.      Significant Labs:   Recent Lab Results       01/31/20  1243   01/31/20  1204   01/30/20  1750   01/30/20  1625   01/30/20  1624        Albumin 2.9     4.4       Alkaline Phosphatase 48     70       ALT 28     39       Amylase       35       Anion Gap 11     9       Appearance, UA     Clear         AST 25     27       Baso #       0.02       Basophil%       0.3       Bilirubin (UA)     Negative         BILIRUBIN TOTAL 0.3  Comment:  For infants and newborns, interpretation of results should be based  on gestational age, weight and in agreement with clinical  observations.  Premature Infant recommended reference ranges:  Up to 24 hours.............<8.0 mg/dL  Up to 48 hours............<12.0 mg/dL  3-5 days..................<15.0 mg/dL  6-29 days.................<15.0 mg/dL       0.5  Comment:  For infants and newborns, interpretation of results should be based  on gestational age, weight and in agreement with clinical  observations.  Premature Infant recommended reference ranges:  Up to 24 hours.............<8.0 mg/dL  Up to 48 hours............<12.0 mg/dL  3-5 days..................<15.0 mg/dL  6-29 days.................<15.0  mg/dL         Blood Culture, Routine       No Growth to date[P] No Growth to date[P]     BUN, Bld 14     24       Calcium 7.4     8.8       Chloride 112     108       CO2 20     24       Color, UA     Yellow         Creatinine 1.0     1.3       Differential Method       Automated       eGFR if  >60.0     53.4       eGFR if non  >60.0  Comment:  Calculation used to obtain the estimated glomerular filtration  rate (eGFR) is the CKD-EPI equation.        46.3  Comment:  Calculation used to obtain the estimated glomerular filtration  rate (eGFR) is the CKD-EPI equation.          Eos #       0.0       Eosinophil%       0.0       Glucose 89     120       Glucose, UA     Negative         Gran # (ANC)       4.7       Gran%       79.2       Hematocrit       38.4       Hemoglobin       12.8       Immature Grans (Abs)       0.02  Comment:  Mild elevation in immature granulocytes is non specific and   can be seen in a variety of conditions including stress response,   acute inflammation, trauma and pregnancy. Correlation with other   laboratory and clinical findings is essential.         Immature Granulocytes       0.3       Ketones, UA     Trace         Leukocytes, UA     Negative         Lipase       25       Lymph #       0.9       Lymph%       14.8       MCH       29.2       MCHC       33.3       MCV       88       Mono #       0.3       Mono%       5.4       MPV       9.8       NITRITE UA     Negative         nRBC       0       Occult Blood   Negative           Occult Blood UA     Trace         pH, UA     6.0         Platelets       213       Potassium 3.0     3.8       PROTEIN TOTAL 5.0     7.2       Protein, UA     Negative  Comment:  Recommend a 24 hour urine protein or a urine   protein/creatinine ratio if globulin induced proteinuria is  clinically suspected.           RBC       4.38       RDW       12.6       Rotavirus   Negative           Sodium 143     141       Specific [place], UA      >=1.030         Specimen UA     Urine, Clean Catch         UROBILINOGEN UA     Negative         Stool WBC   No neutrophils seen           WBC       5.93                          All pertinent labs within the past 24 hours have been reviewed.    Significant Imaging: I have reviewed and interpreted all pertinent imaging results/findings within the past 24 hours.      Assessment/Plan:      * Acute kidney injury  01/30/2020:  · Admit to medical-surgical unit and begin IV fluids  · Obtain labs to include CBC CMP amylase lipase and urinalysis.  · Begin empiric antibiotics of ciprofloxacin 400 mg IV twice daily and metronidazole 500 mg 3 times daily  · Treat symptomatically as needed with Zofran and pain medicine as needed.  · Rehydrate overnight and plan CT scan of the abdomen pelvis with contrast in the a.m..    01/31/2020:  · Acute kidney injury has improved.  Patient continues to complain of upper quadrants pains and epigastric pains  · Will do CT of the abdomen and pelvis with contrast this morning  · If CT scan negative will discharge in the a.m..  · Repeat labs in the a.m. to include CBC and CMP  · Treat symptomatically otherwise.      Gastroesophageal reflux disease  Will treat for gastroesophageal reflux disease by Protonix      COPD (chronic obstructive pulmonary disease)  COPD stable and will continue nebulization treatments        VTE Risk Mitigation (From admission, onward)         Ordered     heparin (porcine) injection 5,000 Units  Every 8 hours      01/30/20 0405     IP VTE HIGH RISK PATIENT  Once      01/30/20 5608                      Wyatt Gurrola MD  Department of Hospital Medicine   [facility]

## 2020-01-31 NOTE — SUBJECTIVE & OBJECTIVE
Interval History:     Review of Systems   Constitutional: Positive for fatigue. Negative for activity change, appetite change and fever.   HENT: Negative for congestion, ear discharge, mouth sores, nosebleeds, rhinorrhea, sinus pressure, sinus pain and tinnitus.    Eyes: Negative.  Negative for pain, redness and itching.   Respiratory: Positive for cough, chest tightness and shortness of breath. Negative for apnea, choking, wheezing and stridor.    Cardiovascular: Negative for chest pain, palpitations and leg swelling.   Gastrointestinal: Positive for abdominal distention, abdominal pain, diarrhea, nausea and vomiting. Negative for anal bleeding, blood in stool and constipation.   Endocrine: Negative.    Genitourinary: Negative for difficulty urinating, flank pain, frequency and urgency.   Musculoskeletal: Negative for arthralgias, back pain, gait problem and myalgias.   Skin: Negative for color change and pallor.   Allergic/Immunologic: Negative.    Neurological: Positive for weakness. Negative for dizziness, facial asymmetry, light-headedness and headaches.   Hematological: Negative for adenopathy. Does not bruise/bleed easily.   Psychiatric/Behavioral: The patient is nervous/anxious.      Objective:     Vital Signs (Most Recent):  Temp: 97.3 °F (36.3 °C) (01/31/20 1405)  Pulse: 76 (01/31/20 1526)  Resp: 16 (01/31/20 1526)  BP: 136/76 (01/31/20 1416)  SpO2: 96 % (01/31/20 1526) Vital Signs (24h Range):  Temp:  [97.3 °F (36.3 °C)-98.9 °F (37.2 °C)] 97.3 °F (36.3 °C)  Pulse:  [66-92] 76  Resp:  [14-18] 16  SpO2:  [95 %-98 %] 96 %  BP: ()/(48-91) 136/76     Weight: 59.9 kg (132 lb)  Body mass index is 22.66 kg/m².    Intake/Output Summary (Last 24 hours) at 1/31/2020 1545  Last data filed at 1/31/2020 1400  Gross per 24 hour   Intake 2615 ml   Output 1500 ml   Net 1115 ml      Physical Exam   Constitutional: She is oriented to person, place, and time. She appears well-developed and well-nourished.   HENT:    Head: Normocephalic and atraumatic.   Eyes: Pupils are equal, round, and reactive to light. EOM are normal.   Neck: Normal range of motion. Neck supple. No tracheal deviation present. No thyromegaly present.   Cardiovascular: Normal rate, regular rhythm and normal heart sounds.   Pulmonary/Chest: Effort normal and breath sounds normal. No respiratory distress.   Abdominal: Soft. Bowel sounds are normal. She exhibits distension. There is tenderness. There is no rebound and no guarding.   Musculoskeletal: Normal range of motion.   Lymphadenopathy:     She has no cervical adenopathy.   Neurological: She is alert and oriented to person, place, and time.   Skin: Skin is warm and dry. Capillary refill takes less than 2 seconds.   Psychiatric: She has a normal mood and affect. Her behavior is normal. Judgment and thought content normal.   Nursing note and vitals reviewed.      Significant Labs:   Recent Lab Results       01/31/20  1243   01/31/20  1204   01/30/20  1750   01/30/20  1625   01/30/20  1624        Albumin 2.9     4.4       Alkaline Phosphatase 48     70       ALT 28     39       Amylase       35       Anion Gap 11     9       Appearance, UA     Clear         AST 25     27       Baso #       0.02       Basophil%       0.3       Bilirubin (UA)     Negative         BILIRUBIN TOTAL 0.3  Comment:  For infants and newborns, interpretation of results should be based  on gestational age, weight and in agreement with clinical  observations.  Premature Infant recommended reference ranges:  Up to 24 hours.............<8.0 mg/dL  Up to 48 hours............<12.0 mg/dL  3-5 days..................<15.0 mg/dL  6-29 days.................<15.0 mg/dL       0.5  Comment:  For infants and newborns, interpretation of results should be based  on gestational age, weight and in agreement with clinical  observations.  Premature Infant recommended reference ranges:  Up to 24 hours.............<8.0 mg/dL  Up to 48 hours............<12.0  mg/dL  3-5 days..................<15.0 mg/dL  6-29 days.................<15.0 mg/dL         Blood Culture, Routine       No Growth to date[P] No Growth to date[P]     BUN, Bld 14     24       Calcium 7.4     8.8       Chloride 112     108       CO2 20     24       Color, UA     Yellow         Creatinine 1.0     1.3       Differential Method       Automated       eGFR if  >60.0     53.4       eGFR if non  >60.0  Comment:  Calculation used to obtain the estimated glomerular filtration  rate (eGFR) is the CKD-EPI equation.        46.3  Comment:  Calculation used to obtain the estimated glomerular filtration  rate (eGFR) is the CKD-EPI equation.          Eos #       0.0       Eosinophil%       0.0       Glucose 89     120       Glucose, UA     Negative         Gran # (ANC)       4.7       Gran%       79.2       Hematocrit       38.4       Hemoglobin       12.8       Immature Grans (Abs)       0.02  Comment:  Mild elevation in immature granulocytes is non specific and   can be seen in a variety of conditions including stress response,   acute inflammation, trauma and pregnancy. Correlation with other   laboratory and clinical findings is essential.         Immature Granulocytes       0.3       Ketones, UA     Trace         Leukocytes, UA     Negative         Lipase       25       Lymph #       0.9       Lymph%       14.8       MCH       29.2       MCHC       33.3       MCV       88       Mono #       0.3       Mono%       5.4       MPV       9.8       NITRITE UA     Negative         nRBC       0       Occult Blood   Negative           Occult Blood UA     Trace         pH, UA     6.0         Platelets       213       Potassium 3.0     3.8       PROTEIN TOTAL 5.0     7.2       Protein, UA     Negative  Comment:  Recommend a 24 hour urine protein or a urine   protein/creatinine ratio if globulin induced proteinuria is  clinically suspected.           RBC       4.38       RDW       12.6        Rotavirus   Negative           Sodium 143     141       Specific [place], UA     >=1.030         Specimen UA     Urine, Clean Catch         UROBILINOGEN UA     Negative         Stool WBC   No neutrophils seen           WBC       5.93                          All pertinent labs within the past 24 hours have been reviewed.    Significant Imaging: I have reviewed and interpreted all pertinent imaging results/findings within the past 24 hours.

## 2020-01-31 NOTE — PLAN OF CARE
Problem: Adult Inpatient Plan of Care  Goal: Plan of Care Review  Outcome: Ongoing, Progressing  Goal: Patient-Specific Goal (Individualization)  Outcome: Ongoing, Progressing  Goal: Absence of Hospital-Acquired Illness or Injury  Outcome: Ongoing, Progressing  Goal: Optimal Comfort and Wellbeing  Outcome: Ongoing, Progressing  Goal: Readiness for Transition of Care  Outcome: Ongoing, Progressing     Problem: Fall Injury Risk  Goal: Absence of Fall and Fall-Related Injury  Outcome: Ongoing, Progressing     Problem: Infection  Goal: Infection Symptom Resolution  Outcome: Ongoing, Progressing

## 2020-01-31 NOTE — HOSPITAL COURSE
01/31/2020:  · Patient was admitted yesterday with nausea vomiting and diarrhea.  No further diarrhea nausea or vomiting overnight.  However patient states that she feels better although she still has epigastric abdominal pain.  Patient has had a cholecystectomy in the past.  Labs this morning showed no significant abnormality except for potassium of 3.0 chloride 112 bicarb 20 and GFR greater than 60.  BUN and creatinine this morning was 14 in 1.0.  Patient will be sent for CT scan of the abdomen pelvis today with contrast.  This will be followed and if normal patient will be discharged tomorrow with diagnosis of acute gastroenteritis.    02/01/2020:  · CT scan of the abdomen pelvis yesterday with contrast revealed transverse and left-sided::  Thickening suggestive of acute gastroenteritis.  Inflammatory bowel disease cannot be ruled out.  Patient will be kept until Monday for continuing IV antibiotic therapy as well as surgical consult to did patient recently had endoscopy without difficulty.  · Labs otherwise revealed low potassium at 3.0 GFR greater than 60 and other chemistry unremarkable  · Vital signs have been stable without abnormality.  Patient has been afebrile.  Blood pressure is now normal    2/2/2020:  Patient remains with lower abdominal cramping  Labs:  Potassium 3.3, otherwise unremarkable  WBC: 2.92 H/H stable  Continue current antibiotics, pain control  Consult General Surgery in the AM    02/03/2020:  Patient is stable with much less abdominal pain. Patient states that some lower abdominal pain remains but it is nearly resolved.  Vital signs are stable with normal blood pressure patient been afebrile and other vitals have been normal.  Labs this morning showed normal white blood cell count normal differential and chemistry showed sodium 142 potassium 4.0 chloride normal at 107 BUN creatinine were 6 and 1.0 GFR greater than 60

## 2020-02-01 PROBLEM — K52.9 GASTROENTERITIS: Status: ACTIVE | Noted: 2020-02-01

## 2020-02-01 LAB
ANION GAP SERPL CALC-SCNC: 9 MMOL/L (ref 8–16)
BUN SERPL-MCNC: 6 MG/DL (ref 6–20)
CALCIUM SERPL-MCNC: 8.1 MG/DL (ref 8.7–10.5)
CHLORIDE SERPL-SCNC: 106 MMOL/L (ref 95–110)
CO2 SERPL-SCNC: 21 MMOL/L (ref 23–29)
CREAT SERPL-MCNC: 1 MG/DL (ref 0.5–1.4)
EST. GFR  (AFRICAN AMERICAN): >60 ML/MIN/1.73 M^2
EST. GFR  (NON AFRICAN AMERICAN): >60 ML/MIN/1.73 M^2
GLUCOSE SERPL-MCNC: 91 MG/DL (ref 70–110)
POTASSIUM SERPL-SCNC: 3 MMOL/L (ref 3.5–5.1)
SODIUM SERPL-SCNC: 136 MMOL/L (ref 136–145)

## 2020-02-01 PROCEDURE — 96372 THER/PROPH/DIAG INJ SC/IM: CPT

## 2020-02-01 PROCEDURE — S0030 INJECTION, METRONIDAZOLE: HCPCS | Performed by: INTERNAL MEDICINE

## 2020-02-01 PROCEDURE — 96376 TX/PRO/DX INJ SAME DRUG ADON: CPT

## 2020-02-01 PROCEDURE — 80048 BASIC METABOLIC PNL TOTAL CA: CPT

## 2020-02-01 PROCEDURE — 96366 THER/PROPH/DIAG IV INF ADDON: CPT

## 2020-02-01 PROCEDURE — 36415 COLL VENOUS BLD VENIPUNCTURE: CPT

## 2020-02-01 PROCEDURE — 94640 AIRWAY INHALATION TREATMENT: CPT

## 2020-02-01 PROCEDURE — 94761 N-INVAS EAR/PLS OXIMETRY MLT: CPT

## 2020-02-01 PROCEDURE — 63600175 PHARM REV CODE 636 W HCPCS: Performed by: INTERNAL MEDICINE

## 2020-02-01 PROCEDURE — 25000003 PHARM REV CODE 250: Performed by: INTERNAL MEDICINE

## 2020-02-01 PROCEDURE — 96361 HYDRATE IV INFUSION ADD-ON: CPT

## 2020-02-01 PROCEDURE — 25000242 PHARM REV CODE 250 ALT 637 W/ HCPCS: Performed by: INTERNAL MEDICINE

## 2020-02-01 PROCEDURE — C9113 INJ PANTOPRAZOLE SODIUM, VIA: HCPCS | Performed by: INTERNAL MEDICINE

## 2020-02-01 PROCEDURE — 96375 TX/PRO/DX INJ NEW DRUG ADDON: CPT

## 2020-02-01 PROCEDURE — G0378 HOSPITAL OBSERVATION PER HR: HCPCS

## 2020-02-01 RX ORDER — POTASSIUM CHLORIDE 20 MEQ/1
40 TABLET, EXTENDED RELEASE ORAL ONCE
Status: COMPLETED | OUTPATIENT
Start: 2020-02-01 | End: 2020-02-01

## 2020-02-01 RX ORDER — SUCRALFATE 1 G/10ML
1 SUSPENSION ORAL EVERY 6 HOURS
Status: DISCONTINUED | OUTPATIENT
Start: 2020-02-01 | End: 2020-02-03 | Stop reason: HOSPADM

## 2020-02-01 RX ORDER — CHLORDIAZEPOXIDE HYDROCHLORIDE AND CLIDINIUM BROMIDE 5; 2.5 MG/1; MG/1
1 CAPSULE ORAL
Status: DISCONTINUED | OUTPATIENT
Start: 2020-02-01 | End: 2020-02-03 | Stop reason: HOSPADM

## 2020-02-01 RX ORDER — POTASSIUM CHLORIDE 7.45 MG/ML
10 INJECTION INTRAVENOUS ONCE
Status: COMPLETED | OUTPATIENT
Start: 2020-02-01 | End: 2020-02-01

## 2020-02-01 RX ORDER — BISACODYL 10 MG
10 SUPPOSITORY, RECTAL RECTAL DAILY PRN
Status: DISCONTINUED | OUTPATIENT
Start: 2020-02-01 | End: 2020-02-03 | Stop reason: HOSPADM

## 2020-02-01 RX ADMIN — POTASSIUM CHLORIDE 10 MEQ: 10 INJECTION, SOLUTION INTRAVENOUS at 05:02

## 2020-02-01 RX ADMIN — SODIUM CHLORIDE: 0.9 INJECTION, SOLUTION INTRAVENOUS at 02:02

## 2020-02-01 RX ADMIN — IPRATROPIUM BROMIDE AND ALBUTEROL SULFATE 3 ML: .5; 3 SOLUTION RESPIRATORY (INHALATION) at 11:02

## 2020-02-01 RX ADMIN — ASPIRIN 325 MG ORAL TABLET 325 MG: 325 PILL ORAL at 08:02

## 2020-02-01 RX ADMIN — IPRATROPIUM BROMIDE AND ALBUTEROL SULFATE 3 ML: .5; 3 SOLUTION RESPIRATORY (INHALATION) at 03:02

## 2020-02-01 RX ADMIN — CIPROFLOXACIN 400 MG: 2 INJECTION, SOLUTION INTRAVENOUS at 05:02

## 2020-02-01 RX ADMIN — ONDANSETRON 4 MG: 2 INJECTION INTRAMUSCULAR; INTRAVENOUS at 03:02

## 2020-02-01 RX ADMIN — TIZANIDINE 4 MG: 4 TABLET ORAL at 09:02

## 2020-02-01 RX ADMIN — HEPARIN SODIUM 5000 UNITS: 5000 INJECTION, SOLUTION INTRAVENOUS; SUBCUTANEOUS at 09:02

## 2020-02-01 RX ADMIN — SUCRALFATE 1 G: 1 SUSPENSION ORAL at 05:02

## 2020-02-01 RX ADMIN — HEPARIN SODIUM 5000 UNITS: 5000 INJECTION, SOLUTION INTRAVENOUS; SUBCUTANEOUS at 02:02

## 2020-02-01 RX ADMIN — IPRATROPIUM BROMIDE AND ALBUTEROL SULFATE 3 ML: .5; 3 SOLUTION RESPIRATORY (INHALATION) at 07:02

## 2020-02-01 RX ADMIN — HEPARIN SODIUM 5000 UNITS: 5000 INJECTION, SOLUTION INTRAVENOUS; SUBCUTANEOUS at 05:02

## 2020-02-01 RX ADMIN — TIZANIDINE 4 MG: 4 TABLET ORAL at 02:02

## 2020-02-01 RX ADMIN — METRONIDAZOLE 500 MG: 500 INJECTION, SOLUTION INTRAVENOUS at 08:02

## 2020-02-01 RX ADMIN — IPRATROPIUM BROMIDE AND ALBUTEROL SULFATE 3 ML: .5; 3 SOLUTION RESPIRATORY (INHALATION) at 12:02

## 2020-02-01 RX ADMIN — POTASSIUM CHLORIDE 40 MEQ: 1500 TABLET, EXTENDED RELEASE ORAL at 05:02

## 2020-02-01 RX ADMIN — ISOSORBIDE MONONITRATE 30 MG: 30 TABLET, EXTENDED RELEASE ORAL at 09:02

## 2020-02-01 RX ADMIN — METRONIDAZOLE 500 MG: 500 INJECTION, SOLUTION INTRAVENOUS at 04:02

## 2020-02-01 RX ADMIN — PANTOPRAZOLE SODIUM 40 MG: 40 INJECTION, POWDER, LYOPHILIZED, FOR SOLUTION INTRAVENOUS at 08:02

## 2020-02-01 RX ADMIN — TIOTROPIUM BROMIDE 18 MCG: 18 CAPSULE ORAL; RESPIRATORY (INHALATION) at 07:02

## 2020-02-01 RX ADMIN — GABAPENTIN 300 MG: 300 CAPSULE ORAL at 02:02

## 2020-02-01 RX ADMIN — TIZANIDINE 4 MG: 4 TABLET ORAL at 05:02

## 2020-02-01 RX ADMIN — GABAPENTIN 300 MG: 300 CAPSULE ORAL at 09:02

## 2020-02-01 RX ADMIN — GABAPENTIN 300 MG: 300 CAPSULE ORAL at 08:02

## 2020-02-01 NOTE — ASSESSMENT & PLAN NOTE
01/30/2020:  · Admit to medical-surgical unit and begin IV fluids  · Obtain labs to include CBC CMP amylase lipase and urinalysis.  · Begin empiric antibiotics of ciprofloxacin 400 mg IV twice daily and metronidazole 500 mg 3 times daily  · Treat symptomatically as needed with Zofran and pain medicine as needed.  · Rehydrate overnight and plan CT scan of the abdomen pelvis with contrast in the a.m..    01/31/2020:  · Acute kidney injury has improved.  Patient continues to complain of upper quadrants pains and epigastric pains  · Will do CT of the abdomen and pelvis with contrast this morning  · If CT scan negative will discharge in the a.m..  · Repeat labs in the a.m. to include CBC and CMP  · Treat symptomatically otherwise.    02/01/2020:  · Patient is stable but still having significant amount of pain with palpation and decreased bowel sounds in the left lower quadrant.  · CT scan does corroborate physical findings with thickening of the bowel wall suggestive of a gastroenteritis.

## 2020-02-01 NOTE — PROGRESS NOTES
PT BP STABLE. PT DENIES ANY COMPLAINTS. FALL PROTOCOL LIFTED AT THIS TIME. FALL SCORE 3. INFORMED PT TO NOTIFY NURSE OF ANY CONCERNS/NEEDS. CL,RN

## 2020-02-01 NOTE — PROGRESS NOTES
[facility] Hospital Medicine  Progress Note    Patient Name: Clara Dotson  MRN: [id number]  Patient Class: OP- Observation   Admission Date: 1/30/2020  Length of Stay: 0 days  Attending Physician: Wyatt Gurrola MD  Primary Care Provider: Chai Adamson MD        Subjective:     Principal Problem:Acute kidney injury        HPI:  Patient is a 54-year-old female that visited her primary care physician today and was complaining of 2 day history of nausea vomiting diarrhea and abdominal pain. Patient stated that she made some CABG on Tuesday and approximately 4-6 hours after she ate this she began to have nausea and vomiting with abdominal pain and subsequently diarrhea.  She denies any fever but states that she has had subjective chills and mid to lower abdominal pain since.  She presented to the [facility] today stating that she could not keep anything down and appeared clinically dehydrated.  Patient has a past medical history significant for breast cancer, anemia and skin cancer.  Past surgical history includes cholecystectomy, hysterectomy and bilateral mastectomy.  Patient admitted for acute kidney injury and dehydration.    Overview/Hospital Course:  01/31/2020:  · Patient was admitted yesterday with nausea vomiting and diarrhea.  No further diarrhea nausea or vomiting overnight.  However patient states that she feels better although she still has epigastric abdominal pain.  Patient has had a cholecystectomy in the past.  Labs this morning showed no significant abnormality except for potassium of 3.0 chloride 112 bicarb 20 and GFR greater than 60.  BUN and creatinine this morning was 14 in 1.0.  Patient will be sent for CT scan of the abdomen pelvis today with contrast.  This will be followed and if normal patient will be discharged tomorrow with diagnosis of acute gastroenteritis.    02/01/2020:  · CT scan of the abdomen pelvis yesterday with contrast revealed  transverse and left-sided::  Thickening suggestive of acute gastroenteritis.  Inflammatory bowel disease cannot be ruled out.  Patient will be kept until Monday for continuing IV antibiotic therapy as well as surgical consult to did patient recently had endoscopy without difficulty.  · Labs otherwise revealed low potassium at 3.0 GFR greater than 60 and other chemistry unremarkable  Vital signs have been stable without abnormality.  Patient has been afebrile.  Blood pressure is now normal    Interval History:     Review of Systems   Constitutional: Negative for activity change, appetite change, fatigue and fever.   HENT: Negative for congestion, ear discharge, mouth sores, nosebleeds, rhinorrhea, sinus pressure, sinus pain and tinnitus.    Eyes: Negative.  Negative for pain, redness and itching.   Respiratory: Negative for apnea, cough, choking, chest tightness, shortness of breath, wheezing and stridor.    Cardiovascular: Negative for chest pain, palpitations and leg swelling.   Gastrointestinal: Positive for abdominal pain and constipation. Negative for abdominal distention, anal bleeding, blood in stool and diarrhea.   Endocrine: Negative.    Genitourinary: Negative for difficulty urinating, flank pain, frequency and urgency.   Musculoskeletal: Negative for arthralgias, back pain, gait problem and myalgias.   Skin: Negative for color change and pallor.   Allergic/Immunologic: Negative.    Neurological: Negative for dizziness, facial asymmetry, weakness, light-headedness and headaches.   Hematological: Negative for adenopathy. Does not bruise/bleed easily.   Psychiatric/Behavioral: The patient is nervous/anxious.      Objective:     Vital Signs (Most Recent):  Temp: 96.4 °F (35.8 °C) (02/01/20 1456)  Pulse: 83 (02/01/20 1552)  Resp: 16 (02/01/20 1552)  BP: 131/73 (02/01/20 1456)  SpO2: 98 % (02/01/20 1552) Vital Signs (24h Range):  Temp:  [96.4 °F (35.8 °C)-97.1 °F (36.2 °C)] 96.4 °F (35.8 °C)  Pulse:  [69-89]  83  Resp:  [16-19] 16  SpO2:  [93 %-100 %] 98 %  BP: (106-131)/(61-77) 131/73     Weight: 59.9 kg (132 lb)  Body mass index is 22.66 kg/m².    Intake/Output Summary (Last 24 hours) at 2/1/2020 1616  Last data filed at 2/1/2020 1300  Gross per 24 hour   Intake 2375 ml   Output 2700 ml   Net -325 ml      Physical Exam   Constitutional: She is oriented to person, place, and time. She appears well-developed and well-nourished.   HENT:   Head: Normocephalic and atraumatic.   Eyes: Pupils are equal, round, and reactive to light. EOM are normal.   Neck: Normal range of motion. Neck supple. No tracheal deviation present. No thyromegaly present.   Cardiovascular: Normal rate, regular rhythm and normal heart sounds.   Pulmonary/Chest: Effort normal and breath sounds normal.   Abdominal: Soft. Bowel sounds are normal. She exhibits no distension. There is tenderness. There is no rebound and no guarding.       Musculoskeletal: Normal range of motion.   Lymphadenopathy:     She has no cervical adenopathy.   Neurological: She is alert and oriented to person, place, and time.   Skin: Skin is warm and dry. Capillary refill takes less than 2 seconds.   Psychiatric: She has a normal mood and affect. Her behavior is normal. Judgment and thought content normal.   Nursing note reviewed.      Significant Labs:   Recent Lab Results       02/01/20  1211        Anion Gap 9     BUN, Bld 6     Calcium 8.1     Chloride 106     CO2 21     Creatinine 1.0     eGFR if African American >60.0     eGFR if non  >60.0  Comment:  Calculation used to obtain the estimated glomerular filtration  rate (eGFR) is the CKD-EPI equation.        Glucose 91     Potassium 3.0     Sodium 136         All pertinent labs within the past 24 hours have been reviewed.    Significant Imaging: I have reviewed and interpreted all pertinent imaging results/findings within the past 24 hours.      Assessment/Plan:      * Acute kidney injury  01/30/2020:  · Admit  to medical-surgical unit and begin IV fluids  · Obtain labs to include CBC CMP amylase lipase and urinalysis.  · Begin empiric antibiotics of ciprofloxacin 400 mg IV twice daily and metronidazole 500 mg 3 times daily  · Treat symptomatically as needed with Zofran and pain medicine as needed.  · Rehydrate overnight and plan CT scan of the abdomen pelvis with contrast in the a.m..    01/31/2020:  · Acute kidney injury has improved.  Patient continues to complain of upper quadrants pains and epigastric pains  · Will do CT of the abdomen and pelvis with contrast this morning  · If CT scan negative will discharge in the a.m..  · Repeat labs in the a.m. to include CBC and CMP  · Treat symptomatically otherwise.    02/01/2020:  · Patient is stable but still having significant amount of pain with palpation and decreased bowel sounds in the left lower quadrant.  · CT scan does corroborate physical findings with thickening of the bowel wall suggestive of a gastroenteritis.      Gastroenteritis  02/01/2020:  Continue current antibiotics  Follow up CBC and CMP in the a.m.  Continue other medications as prescribed.  Begin sucralfate 1 g q.6 hours      Gastroesophageal reflux disease  Will treat for gastroesophageal reflux disease by Protonix      COPD (chronic obstructive pulmonary disease)  COPD stable and will continue nebulization treatments        VTE Risk Mitigation (From admission, onward)         Ordered     heparin (porcine) injection 5,000 Units  Every 8 hours      01/30/20 5465     IP VTE HIGH RISK PATIENT  Once      01/30/20 1375                      Wyatt Gurrola MD  Department of Hospital Medicine   [facility]

## 2020-02-01 NOTE — ASSESSMENT & PLAN NOTE
02/01/2020:  Continue current antibiotics  Follow up CBC and CMP in the a.m.  Continue other medications as prescribed.  Begin sucralfate 1 g q.6 hours

## 2020-02-01 NOTE — PLAN OF CARE
Problem: Adult Inpatient Plan of Care  Goal: Plan of Care Review  Outcome: Ongoing, Progressing     Problem: Fall Injury Risk  Goal: Absence of Fall and Fall-Related Injury  Outcome: Ongoing, Progressing     Problem: Infection  Goal: Infection Symptom Resolution  Outcome: Ongoing, Progressing     Problem: Fluid Imbalance (Acute Kidney Injury/Impairment)  Goal: Optimal Fluid Balance  Outcome: Ongoing, Progressing     Problem: Hematologic Alteration (Acute Kidney Injury/Impairment)  Goal: Hemoglobin, Hematocrit and Platelets Within Normal Range  Outcome: Ongoing, Progressing

## 2020-02-02 LAB
ALBUMIN SERPL BCP-MCNC: 3.1 G/DL (ref 3.5–5.2)
ALP SERPL-CCNC: 54 U/L (ref 55–135)
ALT SERPL W/O P-5'-P-CCNC: 29 U/L (ref 10–44)
ANION GAP SERPL CALC-SCNC: 8 MMOL/L (ref 8–16)
AST SERPL-CCNC: 27 U/L (ref 10–40)
BASOPHILS # BLD AUTO: 0.03 K/UL (ref 0–0.2)
BASOPHILS NFR BLD: 1 % (ref 0–1.9)
BILIRUB SERPL-MCNC: 0.4 MG/DL (ref 0.1–1)
BUN SERPL-MCNC: 5 MG/DL (ref 6–20)
CALCIUM SERPL-MCNC: 8 MG/DL (ref 8.7–10.5)
CHLORIDE SERPL-SCNC: 106 MMOL/L (ref 95–110)
CO2 SERPL-SCNC: 23 MMOL/L (ref 23–29)
CREAT SERPL-MCNC: 1 MG/DL (ref 0.5–1.4)
DIFFERENTIAL METHOD: ABNORMAL
EOSINOPHIL # BLD AUTO: 0.1 K/UL (ref 0–0.5)
EOSINOPHIL NFR BLD: 4.5 % (ref 0–8)
ERYTHROCYTE [DISTWIDTH] IN BLOOD BY AUTOMATED COUNT: 13 % (ref 11.5–14.5)
EST. GFR  (AFRICAN AMERICAN): >60 ML/MIN/1.73 M^2
EST. GFR  (NON AFRICAN AMERICAN): >60 ML/MIN/1.73 M^2
GLUCOSE SERPL-MCNC: 117 MG/DL (ref 70–110)
HCT VFR BLD AUTO: 29.9 % (ref 37–48.5)
HGB BLD-MCNC: 10.1 G/DL (ref 12–16)
IMM GRANULOCYTES # BLD AUTO: 0.02 K/UL (ref 0–0.04)
IMM GRANULOCYTES NFR BLD AUTO: 0.7 % (ref 0–0.5)
LYMPHOCYTES # BLD AUTO: 1.1 K/UL (ref 1–4.8)
LYMPHOCYTES NFR BLD: 37 % (ref 18–48)
MCH RBC QN AUTO: 29.9 PG (ref 27–31)
MCHC RBC AUTO-ENTMCNC: 33.8 G/DL (ref 32–36)
MCV RBC AUTO: 89 FL (ref 82–98)
MONOCYTES # BLD AUTO: 0.3 K/UL (ref 0.3–1)
MONOCYTES NFR BLD: 11 % (ref 4–15)
NEUTROPHILS # BLD AUTO: 1.3 K/UL (ref 1.8–7.7)
NEUTROPHILS NFR BLD: 45.8 % (ref 38–73)
NRBC BLD-RTO: 0 /100 WBC
PLATELET # BLD AUTO: 199 K/UL (ref 150–350)
PMV BLD AUTO: 10.4 FL (ref 9.2–12.9)
POTASSIUM SERPL-SCNC: 3.3 MMOL/L (ref 3.5–5.1)
PROT SERPL-MCNC: 5.3 G/DL (ref 6–8.4)
RBC # BLD AUTO: 3.38 M/UL (ref 4–5.4)
SODIUM SERPL-SCNC: 137 MMOL/L (ref 136–145)
WBC # BLD AUTO: 2.92 K/UL (ref 3.9–12.7)

## 2020-02-02 PROCEDURE — 96376 TX/PRO/DX INJ SAME DRUG ADON: CPT

## 2020-02-02 PROCEDURE — 96366 THER/PROPH/DIAG IV INF ADDON: CPT

## 2020-02-02 PROCEDURE — S0030 INJECTION, METRONIDAZOLE: HCPCS | Performed by: INTERNAL MEDICINE

## 2020-02-02 PROCEDURE — 80053 COMPREHEN METABOLIC PANEL: CPT

## 2020-02-02 PROCEDURE — C9113 INJ PANTOPRAZOLE SODIUM, VIA: HCPCS | Performed by: INTERNAL MEDICINE

## 2020-02-02 PROCEDURE — 96372 THER/PROPH/DIAG INJ SC/IM: CPT

## 2020-02-02 PROCEDURE — 96361 HYDRATE IV INFUSION ADD-ON: CPT

## 2020-02-02 PROCEDURE — 85025 COMPLETE CBC W/AUTO DIFF WBC: CPT

## 2020-02-02 PROCEDURE — 99225 PR SUBSEQUENT OBSERVATION CARE,LEVEL II: ICD-10-PCS | Mod: ,,, | Performed by: INTERNAL MEDICINE

## 2020-02-02 PROCEDURE — 94761 N-INVAS EAR/PLS OXIMETRY MLT: CPT

## 2020-02-02 PROCEDURE — 94640 AIRWAY INHALATION TREATMENT: CPT

## 2020-02-02 PROCEDURE — 25000003 PHARM REV CODE 250: Performed by: INTERNAL MEDICINE

## 2020-02-02 PROCEDURE — 99225 PR SUBSEQUENT OBSERVATION CARE,LEVEL II: CPT | Mod: ,,, | Performed by: INTERNAL MEDICINE

## 2020-02-02 PROCEDURE — G0378 HOSPITAL OBSERVATION PER HR: HCPCS

## 2020-02-02 PROCEDURE — 63600175 PHARM REV CODE 636 W HCPCS: Performed by: INTERNAL MEDICINE

## 2020-02-02 PROCEDURE — 25000242 PHARM REV CODE 250 ALT 637 W/ HCPCS: Performed by: INTERNAL MEDICINE

## 2020-02-02 PROCEDURE — 36415 COLL VENOUS BLD VENIPUNCTURE: CPT

## 2020-02-02 RX ADMIN — HEPARIN SODIUM 5000 UNITS: 5000 INJECTION, SOLUTION INTRAVENOUS; SUBCUTANEOUS at 02:02

## 2020-02-02 RX ADMIN — IPRATROPIUM BROMIDE AND ALBUTEROL SULFATE 3 ML: .5; 3 SOLUTION RESPIRATORY (INHALATION) at 04:02

## 2020-02-02 RX ADMIN — ASPIRIN 325 MG ORAL TABLET 325 MG: 325 PILL ORAL at 08:02

## 2020-02-02 RX ADMIN — TIZANIDINE 4 MG: 4 TABLET ORAL at 02:02

## 2020-02-02 RX ADMIN — SODIUM CHLORIDE: 0.9 INJECTION, SOLUTION INTRAVENOUS at 04:02

## 2020-02-02 RX ADMIN — MORPHINE SULFATE 2 MG: 4 INJECTION, SOLUTION INTRAMUSCULAR; INTRAVENOUS at 11:02

## 2020-02-02 RX ADMIN — TIZANIDINE 4 MG: 4 TABLET ORAL at 09:02

## 2020-02-02 RX ADMIN — SUCRALFATE 1 G: 1 SUSPENSION ORAL at 11:02

## 2020-02-02 RX ADMIN — GABAPENTIN 300 MG: 300 CAPSULE ORAL at 09:02

## 2020-02-02 RX ADMIN — MORPHINE SULFATE 2 MG: 4 INJECTION, SOLUTION INTRAMUSCULAR; INTRAVENOUS at 05:02

## 2020-02-02 RX ADMIN — METRONIDAZOLE 500 MG: 500 INJECTION, SOLUTION INTRAVENOUS at 11:02

## 2020-02-02 RX ADMIN — ISOSORBIDE MONONITRATE 30 MG: 30 TABLET, EXTENDED RELEASE ORAL at 09:02

## 2020-02-02 RX ADMIN — IPRATROPIUM BROMIDE AND ALBUTEROL SULFATE 3 ML: .5; 3 SOLUTION RESPIRATORY (INHALATION) at 07:02

## 2020-02-02 RX ADMIN — LISINOPRIL 5 MG: 2.5 TABLET ORAL at 09:02

## 2020-02-02 RX ADMIN — TIZANIDINE 4 MG: 4 TABLET ORAL at 05:02

## 2020-02-02 RX ADMIN — METRONIDAZOLE 500 MG: 500 INJECTION, SOLUTION INTRAVENOUS at 12:02

## 2020-02-02 RX ADMIN — METRONIDAZOLE 500 MG: 500 INJECTION, SOLUTION INTRAVENOUS at 08:02

## 2020-02-02 RX ADMIN — SUCRALFATE 1 G: 1 SUSPENSION ORAL at 12:02

## 2020-02-02 RX ADMIN — HEPARIN SODIUM 5000 UNITS: 5000 INJECTION, SOLUTION INTRAVENOUS; SUBCUTANEOUS at 05:02

## 2020-02-02 RX ADMIN — GABAPENTIN 300 MG: 300 CAPSULE ORAL at 02:02

## 2020-02-02 RX ADMIN — METRONIDAZOLE 500 MG: 500 INJECTION, SOLUTION INTRAVENOUS at 04:02

## 2020-02-02 RX ADMIN — IPRATROPIUM BROMIDE AND ALBUTEROL SULFATE 3 ML: .5; 3 SOLUTION RESPIRATORY (INHALATION) at 11:02

## 2020-02-02 RX ADMIN — SUCRALFATE 1 G: 1 SUSPENSION ORAL at 05:02

## 2020-02-02 RX ADMIN — CIPROFLOXACIN 400 MG: 2 INJECTION, SOLUTION INTRAVENOUS at 05:02

## 2020-02-02 RX ADMIN — TIOTROPIUM BROMIDE 18 MCG: 18 CAPSULE ORAL; RESPIRATORY (INHALATION) at 07:02

## 2020-02-02 RX ADMIN — GABAPENTIN 300 MG: 300 CAPSULE ORAL at 08:02

## 2020-02-02 RX ADMIN — SODIUM CHLORIDE: 0.9 INJECTION, SOLUTION INTRAVENOUS at 02:02

## 2020-02-02 RX ADMIN — PANTOPRAZOLE SODIUM 40 MG: 40 INJECTION, POWDER, LYOPHILIZED, FOR SOLUTION INTRAVENOUS at 08:02

## 2020-02-02 RX ADMIN — IPRATROPIUM BROMIDE AND ALBUTEROL SULFATE 3 ML: .5; 3 SOLUTION RESPIRATORY (INHALATION) at 03:02

## 2020-02-02 RX ADMIN — HEPARIN SODIUM 5000 UNITS: 5000 INJECTION, SOLUTION INTRAVENOUS; SUBCUTANEOUS at 09:02

## 2020-02-02 NOTE — PLAN OF CARE
Problem: Adult Inpatient Plan of Care  Goal: Plan of Care Review  Outcome: Ongoing, Progressing  Goal: Optimal Comfort and Wellbeing  Outcome: Ongoing, Progressing     Problem: Fluid Imbalance (Acute Kidney Injury/Impairment)  Goal: Optimal Fluid Balance  Outcome: Ongoing, Progressing

## 2020-02-03 VITALS
HEIGHT: 64 IN | DIASTOLIC BLOOD PRESSURE: 76 MMHG | SYSTOLIC BLOOD PRESSURE: 114 MMHG | BODY MASS INDEX: 22.53 KG/M2 | TEMPERATURE: 97 F | WEIGHT: 132 LBS | OXYGEN SATURATION: 97 % | RESPIRATION RATE: 16 BRPM | HEART RATE: 71 BPM

## 2020-02-03 LAB
ALBUMIN SERPL BCP-MCNC: 3.3 G/DL (ref 3.5–5.2)
ALP SERPL-CCNC: 58 U/L (ref 55–135)
ALT SERPL W/O P-5'-P-CCNC: 50 U/L (ref 10–44)
ANION GAP SERPL CALC-SCNC: 9 MMOL/L (ref 8–16)
APTT BLDCRRT: 31.2 SEC (ref 21–32)
AST SERPL-CCNC: 69 U/L (ref 10–40)
BASOPHILS # BLD AUTO: 0.04 K/UL (ref 0–0.2)
BASOPHILS NFR BLD: 0.8 % (ref 0–1.9)
BILIRUB SERPL-MCNC: 0.4 MG/DL (ref 0.1–1)
BUN SERPL-MCNC: 6 MG/DL (ref 6–20)
CALCIUM SERPL-MCNC: 8.5 MG/DL (ref 8.7–10.5)
CHLORIDE SERPL-SCNC: 109 MMOL/L (ref 95–110)
CO2 SERPL-SCNC: 24 MMOL/L (ref 23–29)
CREAT SERPL-MCNC: 1 MG/DL (ref 0.5–1.4)
CRYPTOSP AG STL QL IA: NEGATIVE
DIFFERENTIAL METHOD: ABNORMAL
EOSINOPHIL # BLD AUTO: 0.2 K/UL (ref 0–0.5)
EOSINOPHIL NFR BLD: 3.5 % (ref 0–8)
ERYTHROCYTE [DISTWIDTH] IN BLOOD BY AUTOMATED COUNT: 13.1 % (ref 11.5–14.5)
EST. GFR  (AFRICAN AMERICAN): >60 ML/MIN/1.73 M^2
EST. GFR  (NON AFRICAN AMERICAN): >60 ML/MIN/1.73 M^2
G LAMBLIA AG STL QL IA: NEGATIVE
GLUCOSE SERPL-MCNC: 87 MG/DL (ref 70–110)
HCT VFR BLD AUTO: 34.4 % (ref 37–48.5)
HGB BLD-MCNC: 11.6 G/DL (ref 12–16)
IMM GRANULOCYTES # BLD AUTO: 0.03 K/UL (ref 0–0.04)
IMM GRANULOCYTES NFR BLD AUTO: 0.6 % (ref 0–0.5)
INR PPP: 1.2 (ref 0.8–1.2)
LYMPHOCYTES # BLD AUTO: 1 K/UL (ref 1–4.8)
LYMPHOCYTES NFR BLD: 21.1 % (ref 18–48)
MCH RBC QN AUTO: 29.7 PG (ref 27–31)
MCHC RBC AUTO-ENTMCNC: 33.7 G/DL (ref 32–36)
MCV RBC AUTO: 88 FL (ref 82–98)
MONOCYTES # BLD AUTO: 0.4 K/UL (ref 0.3–1)
MONOCYTES NFR BLD: 8.9 % (ref 4–15)
NEUTROPHILS # BLD AUTO: 3.2 K/UL (ref 1.8–7.7)
NEUTROPHILS NFR BLD: 65.1 % (ref 38–73)
NRBC BLD-RTO: 0 /100 WBC
PLATELET # BLD AUTO: 236 K/UL (ref 150–350)
PMV BLD AUTO: 10.5 FL (ref 9.2–12.9)
POTASSIUM SERPL-SCNC: 4 MMOL/L (ref 3.5–5.1)
PROT SERPL-MCNC: 5.6 G/DL (ref 6–8.4)
PROTHROMBIN TIME: 12.7 SEC (ref 9–12.5)
RBC # BLD AUTO: 3.9 M/UL (ref 4–5.4)
SODIUM SERPL-SCNC: 142 MMOL/L (ref 136–145)
WBC # BLD AUTO: 4.92 K/UL (ref 3.9–12.7)

## 2020-02-03 PROCEDURE — 99217 PR OBSERVATION CARE DISCHARGE: ICD-10-PCS | Mod: ,,, | Performed by: INTERNAL MEDICINE

## 2020-02-03 PROCEDURE — 94640 AIRWAY INHALATION TREATMENT: CPT

## 2020-02-03 PROCEDURE — 63600175 PHARM REV CODE 636 W HCPCS: Performed by: INTERNAL MEDICINE

## 2020-02-03 PROCEDURE — 85610 PROTHROMBIN TIME: CPT

## 2020-02-03 PROCEDURE — 99204 PR OFFICE/OUTPT VISIT, NEW, LEVL IV, 45-59 MIN: ICD-10-PCS | Mod: ,,, | Performed by: SURGERY

## 2020-02-03 PROCEDURE — 85025 COMPLETE CBC W/AUTO DIFF WBC: CPT

## 2020-02-03 PROCEDURE — 96361 HYDRATE IV INFUSION ADD-ON: CPT

## 2020-02-03 PROCEDURE — 99900035 HC TECH TIME PER 15 MIN (STAT)

## 2020-02-03 PROCEDURE — S0030 INJECTION, METRONIDAZOLE: HCPCS | Performed by: INTERNAL MEDICINE

## 2020-02-03 PROCEDURE — 94761 N-INVAS EAR/PLS OXIMETRY MLT: CPT

## 2020-02-03 PROCEDURE — 99204 OFFICE O/P NEW MOD 45 MIN: CPT | Mod: ,,, | Performed by: SURGERY

## 2020-02-03 PROCEDURE — 96376 TX/PRO/DX INJ SAME DRUG ADON: CPT

## 2020-02-03 PROCEDURE — 25000242 PHARM REV CODE 250 ALT 637 W/ HCPCS: Performed by: INTERNAL MEDICINE

## 2020-02-03 PROCEDURE — C9113 INJ PANTOPRAZOLE SODIUM, VIA: HCPCS | Performed by: INTERNAL MEDICINE

## 2020-02-03 PROCEDURE — 96366 THER/PROPH/DIAG IV INF ADDON: CPT

## 2020-02-03 PROCEDURE — 96372 THER/PROPH/DIAG INJ SC/IM: CPT

## 2020-02-03 PROCEDURE — 99217 PR OBSERVATION CARE DISCHARGE: CPT | Mod: ,,, | Performed by: INTERNAL MEDICINE

## 2020-02-03 PROCEDURE — 80053 COMPREHEN METABOLIC PANEL: CPT

## 2020-02-03 PROCEDURE — 25000003 PHARM REV CODE 250: Performed by: INTERNAL MEDICINE

## 2020-02-03 PROCEDURE — 36415 COLL VENOUS BLD VENIPUNCTURE: CPT

## 2020-02-03 PROCEDURE — G0378 HOSPITAL OBSERVATION PER HR: HCPCS

## 2020-02-03 PROCEDURE — 85730 THROMBOPLASTIN TIME PARTIAL: CPT

## 2020-02-03 RX ORDER — CIPROFLOXACIN 500 MG/1
500 TABLET ORAL 2 TIMES DAILY
Qty: 20 TABLET | Refills: 0 | Status: SHIPPED | OUTPATIENT
Start: 2020-02-03 | End: 2020-02-13

## 2020-02-03 RX ORDER — CHLORDIAZEPOXIDE HYDROCHLORIDE AND CLIDINIUM BROMIDE 5; 2.5 MG/1; MG/1
1 CAPSULE ORAL
Qty: 90 CAPSULE | Refills: 2 | Status: SHIPPED | OUTPATIENT
Start: 2020-02-03 | End: 2022-04-29 | Stop reason: ALTCHOICE

## 2020-02-03 RX ORDER — SUCRALFATE 1 G/1
1 TABLET ORAL 4 TIMES DAILY
Qty: 120 TABLET | Refills: 2 | Status: SHIPPED | OUTPATIENT
Start: 2020-02-03 | End: 2022-04-29 | Stop reason: ALTCHOICE

## 2020-02-03 RX ORDER — ONDANSETRON 4 MG/1
4 TABLET, FILM COATED ORAL EVERY 6 HOURS
Qty: 12 TABLET | Refills: 1 | Status: SHIPPED | OUTPATIENT
Start: 2020-02-03 | End: 2020-04-22

## 2020-02-03 RX ADMIN — SODIUM CHLORIDE: 0.9 INJECTION, SOLUTION INTRAVENOUS at 02:02

## 2020-02-03 RX ADMIN — SUCRALFATE 1 G: 1 SUSPENSION ORAL at 11:02

## 2020-02-03 RX ADMIN — IPRATROPIUM BROMIDE AND ALBUTEROL SULFATE 3 ML: .5; 3 SOLUTION RESPIRATORY (INHALATION) at 07:02

## 2020-02-03 RX ADMIN — SUCRALFATE 1 G: 1 SUSPENSION ORAL at 05:02

## 2020-02-03 RX ADMIN — TIZANIDINE 4 MG: 4 TABLET ORAL at 05:02

## 2020-02-03 RX ADMIN — IPRATROPIUM BROMIDE AND ALBUTEROL SULFATE 3 ML: .5; 3 SOLUTION RESPIRATORY (INHALATION) at 12:02

## 2020-02-03 RX ADMIN — ONDANSETRON 4 MG: 2 INJECTION INTRAMUSCULAR; INTRAVENOUS at 05:02

## 2020-02-03 RX ADMIN — PANTOPRAZOLE SODIUM 40 MG: 40 INJECTION, POWDER, LYOPHILIZED, FOR SOLUTION INTRAVENOUS at 09:02

## 2020-02-03 RX ADMIN — ASPIRIN 325 MG ORAL TABLET 325 MG: 325 PILL ORAL at 09:02

## 2020-02-03 RX ADMIN — TIOTROPIUM BROMIDE 18 MCG: 18 CAPSULE ORAL; RESPIRATORY (INHALATION) at 09:02

## 2020-02-03 RX ADMIN — GABAPENTIN 300 MG: 300 CAPSULE ORAL at 09:02

## 2020-02-03 RX ADMIN — HEPARIN SODIUM 5000 UNITS: 5000 INJECTION, SOLUTION INTRAVENOUS; SUBCUTANEOUS at 05:02

## 2020-02-03 RX ADMIN — CIPROFLOXACIN 400 MG: 2 INJECTION, SOLUTION INTRAVENOUS at 05:02

## 2020-02-03 RX ADMIN — METRONIDAZOLE 500 MG: 500 INJECTION, SOLUTION INTRAVENOUS at 09:02

## 2020-02-03 NOTE — CONSULTS
[facility]  General Surgery  Consult Note    Patient Name: Clara Dotson  MRN: [id number]  Admission Date: 1/30/2020  Attending Physician: Wyatt Gurrola MD   Consult Physician: Orestes Alvarado MD  Primary Care Provider: Chai Adamson MD    Patient information was obtained from patient.     Subjective:     Reason for consultation:  Diarrhea, abnormal CT scan abdomen, abdominal pain    History of Present Illness:  Clara Dotson is a 54 y.o. female with a history of breast cancer, skin cancer, recent EGD presented to the hospital and was admitted to the medical service with an acute kidney injury after significant nausea vomiting and diarrhea.  Patient stated she started having abdominal pain last week.  Abdominal pain led to diarrhea, 30-40 times.  Nausea vomiting.  Presented to her primary physician.  Labs were done.  Acute kidney injury was noted relative to the patient's recent dehydration episode.  Patient was admitted to the medical service, IV fluids initiated.  IV antibiotics also initiated.  Patient underwent CT scan over the weekend which showed evidence of enteritis, colitis.  No free fluid or free air.  Patient treated with appropriate IV antibiotics.  Stool culture still pending.  Surgery now called in consultation possible colonoscopy.    Review of patient's allergies indicates:   Allergen Reactions    Adhesive      Silk tape (white tape)     Pcn [penicillins] Hives       Past Medical History:   Diagnosis Date    Anemia     Estrogen receptor negative status (ER-) 5/15/2017    Malignant neoplasm of right breast 5/15/2017    Skin cancer      Past Surgical History:   Procedure Laterality Date    BILATERAL MASTECTOMY      CHOLECYSTECTOMY      colon polyp removal      ESOPHAGOGASTRODUODENOSCOPY N/A 1/14/2020    Procedure: EGD (ESOPHAGOGASTRODUODENOSCOPY);  Surgeon: Bayron Adamson MD;  Location: [facility];  Service: Endoscopy;  Laterality: N/A;     HYSTERECTOMY      LIPOSUCTION      melanoma surgery      NECK SURGERY      reconstructive breast       tummy [facility]       Family History     Problem Relation (Age of Onset)    Cancer Mother, Father    Diabetes Mother    Hypertension Mother, Father        Tobacco Use    Smoking status: Former Smoker     Last attempt to quit: 2014     Years since quittin.0    Smokeless tobacco: Never Used   Substance and Sexual Activity    Alcohol use: No    Drug use: No    Sexual activity: Not on file     Review of Systems   Constitutional: Negative for activity change, appetite change, chills and fever.   HENT: Negative for congestion, dental problem and ear discharge.    Eyes: Negative for discharge and itching.   Respiratory: Negative for apnea, choking and chest tightness.    Cardiovascular: Negative for chest pain and leg swelling.   Gastrointestinal: Positive for diarrhea and nausea. Negative for abdominal distention, abdominal pain, anal bleeding and constipation.   Endocrine: Negative for cold intolerance and heat intolerance.   Genitourinary: Negative for difficulty urinating and dyspareunia.   Musculoskeletal: Negative for arthralgias and back pain.   Skin: Negative for color change and pallor.   Neurological: Negative for dizziness and facial asymmetry.   Hematological: Negative for adenopathy. Does not bruise/bleed easily.   Psychiatric/Behavioral: Negative for agitation and behavioral problems.     Objective:     Vital Signs (Most Recent):  Temp: 98.5 °F (36.9 °C) (20 07)  Pulse: 80 (20 0909)  Resp: 17 (20 09)  BP: 120/66 (20 0721)  SpO2: 96 % (20 0732) Vital Signs (24h Range):  Temp:  [96.1 °F (35.6 °C)-98.5 °F (36.9 °C)] 98.5 °F (36.9 °C)  Pulse:  [69-91] 80  Resp:  [16-18] 17  SpO2:  [91 %-100 %] 96 %  BP: (120-150)/(66-86) 120/66     Weight: 59.9 kg (132 lb)  Body mass index is 22.66 kg/m².    Physical Exam   Constitutional: She is oriented to person, place, and time. She  appears well-developed and well-nourished. No distress.   HENT:   Head: Normocephalic and atraumatic.   Eyes: Pupils are equal, round, and reactive to light. EOM are normal.   Neck: Normal range of motion. Neck supple. No thyromegaly present.   Cardiovascular: Normal rate and regular rhythm.   Pulmonary/Chest: Effort normal and breath sounds normal.   Abdominal: Soft. Bowel sounds are normal. She exhibits no distension. There is tenderness in the epigastric area.       Musculoskeletal: Normal range of motion. She exhibits no edema or deformity.   Neurological: She is alert and oriented to person, place, and time. No cranial nerve deficit.   Skin: Skin is warm. Capillary refill takes less than 2 seconds. No erythema.   Psychiatric: She has a normal mood and affect. Her behavior is normal.       Significant Labs:  CBC:   Recent Labs   Lab 02/03/20 0522   WBC 4.92   RBC 3.90*   HGB 11.6*   HCT 34.4*      MCV 88   MCH 29.7   MCHC 33.7     BMP:   Recent Labs   Lab 02/03/20 0522   GLU 87      K 4.0      CO2 24   BUN 6   CREATININE 1.0   CALCIUM 8.5*     CMP:   Recent Labs   Lab 02/03/20 0522   GLU 87   CALCIUM 8.5*   ALBUMIN 3.3*   PROT 5.6*      K 4.0   CO2 24      BUN 6   CREATININE 1.0   ALKPHOS 58   ALT 50*   AST 69*   BILITOT 0.4     LFTs:   Recent Labs   Lab 02/03/20 0522   ALT 50*   AST 69*   ALKPHOS 58   BILITOT 0.4   PROT 5.6*   ALBUMIN 3.3*     Coagulation:   Recent Labs   Lab 02/03/20 0522   LABPROT 12.7*   INR 1.2   APTT 31.2     Specimen (12h ago, onward)    None        Recent Labs   Lab 01/30/20  1750   COLORU Yellow   SPECGRAV >=1.030*   PHUR 6.0   PROTEINUA Negative   NITRITE Negative   LEUKOCYTESUR Negative   UROBILINOGEN Negative       CT scan reviewed.  Enteritis distal, colitis right colon, mild.    Assessment:   Clara Dotson is a 54 y.o. female who presents with abdominal pain and gastroenteritis flare.    Active Diagnoses:    Diagnosis Date Noted POA     PRINCIPAL PROBLEM:  Acute kidney injury [N17.9] 01/30/2020 Yes    Gastroenteritis [K52.9] 02/01/2020 Unknown    Gastroesophageal reflux disease [K21.9] 10/16/2019 Yes    COPD (chronic obstructive pulmonary disease) [J44.9] 10/02/2018 Yes      Problems Resolved During this Admission:     VTE Risk Mitigation (From admission, onward)         Ordered     heparin (porcine) injection 5,000 Units  Every 8 hours      01/30/20 1559     IP VTE HIGH RISK PATIENT  Once      01/30/20 1559                Medical Decision Making/Plan:  Patient with abdominal pain.  CT scan shows evidence of possible enteritis versus colitis.  IBD unable to rule out.  Surgery now called for possible colonoscopy.  Patient with history of colonoscopy 2018.  No evidence of IBD on that colonoscopy.  Polyp seen.  Patient with family history of colon cancer.  Not due for repeat screening colonoscopy.  Recommendations at this point will be for continued antibiotic treatment, discharge or medically stable per the medical service.  Would recommend colonoscopy 6-8 weeks after this acute flare to rule out IBD.  Risk of intervening at this time high in regards to the risk of perforation with a inflamed colon.  Happy for patient to be seen in surgery clinic in 4-6 weeks from now versus continue with patient's established gastroenterologist.  Surgery will sign off.  Call with questions    Orestes Alvarado MD  General Surgery  [facility]

## 2020-02-03 NOTE — ASSESSMENT & PLAN NOTE
01/30/2020:  · Admit to medical-surgical unit and begin IV fluids  · Obtain labs to include CBC CMP amylase lipase and urinalysis.  · Begin empiric antibiotics of ciprofloxacin 400 mg IV twice daily and metronidazole 500 mg 3 times daily  · Treat symptomatically as needed with Zofran and pain medicine as needed.  · Rehydrate overnight and plan CT scan of the abdomen pelvis with contrast in the a.m..    01/31/2020:  · Acute kidney injury has improved.  Patient continues to complain of upper quadrants pains and epigastric pains  · Will do CT of the abdomen and pelvis with contrast this morning  · If CT scan negative will discharge in the a.m..  · Repeat labs in the a.m. to include CBC and CMP  · Treat symptomatically otherwise.    02/01/2020:  · Patient is stable but still having significant amount of pain with palpation and decreased bowel sounds in the left lower quadrant.  · CT scan does corroborate physical findings with thickening of the bowel wall suggestive of a gastroenteritis.    02/03/2020:  · Discharge to home  · Continue current meds prescribed home  · Librax 1 p.o. b.i.d. p.r.n.  · Ciprofloxacin 500 mg p.o. b.i.d. for 7 more days  · Zofran 4 mg p.o. q.12 hours p.r.n. nausea  · Follow up with primary care physician within 1 week for recheck  · Follow-up with Dr. Alvarado in 8 weeks in office for scheduling colonoscopy

## 2020-02-03 NOTE — ASSESSMENT & PLAN NOTE
02/01/2020:  Continue current antibiotics  Follow up CBC and CMP in the a.m.  Continue other medications as prescribed.  Begin sucralfate 1 g q.6 hours    2/2/2020:  Consult General Surgery in the AM to evaluate for Colonoscopy  Repleat potassium  Repeat labs and bleeding times in AM    02/23/2020:  Discharge to home  Continue home medications  Follow up with Dr. Alvarado in 8 weeks in his office for scheduling colonoscopy

## 2020-02-03 NOTE — PLAN OF CARE
02/03/20 1359   Final Note   Assessment Type Final Discharge Note   Anticipated Discharge Disposition Home   What phone number can be called within the next 1-3 days to see how you are doing after discharge? [phone]   Hospital Follow Up  Appt(s) scheduled? Yes   Discharge plans and expectations educations in teach back method with documentation complete? Yes        02/03/20 1359   Final Note   Assessment Type Final Discharge Note   Anticipated Discharge Disposition Home   What phone number can be called within the next 1-3 days to see how you are doing after discharge? [phone]   Hospital Follow Up  Appt(s) scheduled? Yes   Discharge plans and expectations educations in teach back method with documentation complete? Yes        02/03/20 1359   Final Note   Assessment Type Final Discharge Note   Anticipated Discharge Disposition Home   What phone number can be called within the next 1-3 days to see how you are doing after discharge? [phone]   Hospital Follow Up  Appt(s) scheduled? Yes   Discharge plans and expectations educations in teach back method with documentation complete? Yes   PT IS DISCHARGED HOME TODAY. F/U APPOINTMENTS ARE MADE WITH HER PCP AND WITH DR BALES. PT SAYS SHE HS NO OTHER DISCHARGE NEEDS.

## 2020-02-03 NOTE — SUBJECTIVE & OBJECTIVE
Interval History:     Review of Systems   Constitutional: Negative for activity change, appetite change, fatigue and fever.   HENT: Negative for congestion, ear discharge, mouth sores, nosebleeds, rhinorrhea, sinus pressure, sinus pain and tinnitus.    Eyes: Negative.  Negative for pain, redness and itching.   Respiratory: Negative for apnea, cough, choking, chest tightness, shortness of breath, wheezing and stridor.    Cardiovascular: Negative for chest pain, palpitations and leg swelling.   Gastrointestinal: Positive for abdominal distention, abdominal pain and constipation. Negative for anal bleeding, blood in stool and diarrhea.   Endocrine: Negative.    Genitourinary: Negative for difficulty urinating, flank pain, frequency and urgency.   Musculoskeletal: Negative for arthralgias, back pain, gait problem and myalgias.   Skin: Negative for color change and pallor.   Allergic/Immunologic: Negative.    Neurological: Negative for dizziness, facial asymmetry, weakness, light-headedness and headaches.   Hematological: Negative for adenopathy. Does not bruise/bleed easily.   Psychiatric/Behavioral: The patient is nervous/anxious.      Objective:     Vital Signs (Most Recent):  Temp: 96.4 °F (35.8 °C) (02/02/20 1449)  Pulse: 69 (02/02/20 1611)  Resp: 16 (02/02/20 1611)  BP: (!) 150/86 (02/02/20 1449)  SpO2: 100 % (02/02/20 1611) Vital Signs (24h Range):  Temp:  [96 °F (35.6 °C)-97.9 °F (36.6 °C)] 96.4 °F (35.8 °C)  Pulse:  [69-96] 69  Resp:  [16-19] 16  SpO2:  [95 %-100 %] 100 %  BP: (114-150)/(58-86) 150/86     Weight: 59.9 kg (132 lb)  Body mass index is 22.66 kg/m².    Intake/Output Summary (Last 24 hours) at 2/2/2020 1847  Last data filed at 2/2/2020 1637  Gross per 24 hour   Intake 2193.75 ml   Output 2300 ml   Net -106.25 ml      Physical Exam   Constitutional: She is oriented to person, place, and time. She appears well-developed and well-nourished.   HENT:   Head: Normocephalic and atraumatic.   Eyes: Pupils  are equal, round, and reactive to light. EOM are normal.   Neck: Normal range of motion. Neck supple. No tracheal deviation present. No thyromegaly present.   Cardiovascular: Normal rate, regular rhythm and normal heart sounds.   Pulmonary/Chest: Effort normal and breath sounds normal.   Abdominal: Soft. Bowel sounds are normal. She exhibits distension. There is tenderness. There is guarding. There is no rebound.   Musculoskeletal: Normal range of motion.   Lymphadenopathy:     She has no cervical adenopathy.   Neurological: She is alert and oriented to person, place, and time.   Skin: Skin is warm and dry. Capillary refill takes less than 2 seconds.   Psychiatric: She has a normal mood and affect. Her behavior is normal. Judgment and thought content normal.   Nursing note and vitals reviewed.      Significant Labs:   Recent Lab Results       02/02/20  0630        Albumin 3.1     Alkaline Phosphatase 54     ALT 29     Anion Gap 8     AST 27     Baso # 0.03     Basophil% 1.0     BILIRUBIN TOTAL 0.4  Comment:  For infants and newborns, interpretation of results should be based  on gestational age, weight and in agreement with clinical  observations.  Premature Infant recommended reference ranges:  Up to 24 hours.............<8.0 mg/dL  Up to 48 hours............<12.0 mg/dL  3-5 days..................<15.0 mg/dL  6-29 days.................<15.0 mg/dL       BUN, Bld 5     Calcium 8.0     Chloride 106     CO2 23     Creatinine 1.0     Differential Method Automated     eGFR if African American >60.0     eGFR if non  >60.0  Comment:  Calculation used to obtain the estimated glomerular filtration  rate (eGFR) is the CKD-EPI equation.        Eos # 0.1     Eosinophil% 4.5     Glucose 117     Gran # (ANC) 1.3     Gran% 45.8     Hematocrit 29.9     Hemoglobin 10.1     Immature Grans (Abs) 0.02  Comment:  Mild elevation in immature granulocytes is non specific and   can be seen in a variety of conditions  including stress response,   acute inflammation, trauma and pregnancy. Correlation with other   laboratory and clinical findings is essential.       Immature Granulocytes 0.7     Lymph # 1.1     Lymph% 37.0     MCH 29.9     MCHC 33.8     MCV 89     Mono # 0.3     Mono% 11.0     MPV 10.4     nRBC 0     Platelets 199     Potassium 3.3     PROTEIN TOTAL 5.3     RBC 3.38     RDW 13.0     Sodium 137     WBC 2.92         All pertinent labs within the past 24 hours have been reviewed.    Significant Imaging: I have reviewed and interpreted all pertinent imaging results/findings within the past 24 hours.

## 2020-02-03 NOTE — PROGRESS NOTES
[facility] Medicine  Progress Note    Patient Name: Clara Dotson  MRN: [id number]  Patient Class: OP- Observation   Admission Date: 1/30/2020  Length of Stay: 0 days  Attending Physician: Wyatt Gurrola MD  Primary Care Provider: Chai Adamson MD        Subjective:     Principal Problem:Acute kidney injury        HPI:  Patient is a 54-year-old female that visited her primary care physician today and was complaining of 2 day history of nausea vomiting diarrhea and abdominal pain. Patient stated that she made some CABG on Tuesday and approximately 4-6 hours after she ate this she began to have nausea and vomiting with abdominal pain and subsequently diarrhea.  She denies any fever but states that she has had subjective chills and mid to lower abdominal pain since.  She presented to the [facility] today stating that she could not keep anything down and appeared clinically dehydrated.  Patient has a past medical history significant for breast cancer, anemia and skin cancer.  Past surgical history includes cholecystectomy, hysterectomy and bilateral mastectomy.  Patient admitted for acute kidney injury and dehydration.    Overview/Hospital Course:  01/31/2020:  · Patient was admitted yesterday with nausea vomiting and diarrhea.  No further diarrhea nausea or vomiting overnight.  However patient states that she feels better although she still has epigastric abdominal pain.  Patient has had a cholecystectomy in the past.  Labs this morning showed no significant abnormality except for potassium of 3.0 chloride 112 bicarb 20 and GFR greater than 60.  BUN and creatinine this morning was 14 in 1.0.  Patient will be sent for CT scan of the abdomen pelvis today with contrast.  This will be followed and if normal patient will be discharged tomorrow with diagnosis of acute gastroenteritis.    02/01/2020:  · CT scan of the abdomen pelvis yesterday with contrast revealed  transverse and left-sided::  Thickening suggestive of acute gastroenteritis.  Inflammatory bowel disease cannot be ruled out.  Patient will be kept until Monday for continuing IV antibiotic therapy as well as surgical consult to did patient recently had endoscopy without difficulty.  · Labs otherwise revealed low potassium at 3.0 GFR greater than 60 and other chemistry unremarkable  · Vital signs have been stable without abnormality.  Patient has been afebrile.  Blood pressure is now normal    2/2/2020:  Patient remains with lower abdominal cramping  Labs:  Potassium 3.3, otherwise unremarkable  WBC: 2.92 H/H stable  Continue current antibiotics, pain control  Consult General Surgery in the AM      Interval History:     Review of Systems   Constitutional: Negative for activity change, appetite change, fatigue and fever.   HENT: Negative for congestion, ear discharge, mouth sores, nosebleeds, rhinorrhea, sinus pressure, sinus pain and tinnitus.    Eyes: Negative.  Negative for pain, redness and itching.   Respiratory: Negative for apnea, cough, choking, chest tightness, shortness of breath, wheezing and stridor.    Cardiovascular: Negative for chest pain, palpitations and leg swelling.   Gastrointestinal: Positive for abdominal distention, abdominal pain and constipation. Negative for anal bleeding, blood in stool and diarrhea.   Endocrine: Negative.    Genitourinary: Negative for difficulty urinating, flank pain, frequency and urgency.   Musculoskeletal: Negative for arthralgias, back pain, gait problem and myalgias.   Skin: Negative for color change and pallor.   Allergic/Immunologic: Negative.    Neurological: Negative for dizziness, facial asymmetry, weakness, light-headedness and headaches.   Hematological: Negative for adenopathy. Does not bruise/bleed easily.   Psychiatric/Behavioral: The patient is nervous/anxious.      Objective:     Vital Signs (Most Recent):  Temp: 96.4 °F (35.8 °C) (02/02/20 1449)  Pulse:  69 (02/02/20 1611)  Resp: 16 (02/02/20 1611)  BP: (!) 150/86 (02/02/20 1449)  SpO2: 100 % (02/02/20 1611) Vital Signs (24h Range):  Temp:  [96 °F (35.6 °C)-97.9 °F (36.6 °C)] 96.4 °F (35.8 °C)  Pulse:  [69-96] 69  Resp:  [16-19] 16  SpO2:  [95 %-100 %] 100 %  BP: (114-150)/(58-86) 150/86     Weight: 59.9 kg (132 lb)  Body mass index is 22.66 kg/m².    Intake/Output Summary (Last 24 hours) at 2/2/2020 1847  Last data filed at 2/2/2020 1637  Gross per 24 hour   Intake 2193.75 ml   Output 2300 ml   Net -106.25 ml      Physical Exam   Constitutional: She is oriented to person, place, and time. She appears well-developed and well-nourished.   HENT:   Head: Normocephalic and atraumatic.   Eyes: Pupils are equal, round, and reactive to light. EOM are normal.   Neck: Normal range of motion. Neck supple. No tracheal deviation present. No thyromegaly present.   Cardiovascular: Normal rate, regular rhythm and normal heart sounds.   Pulmonary/Chest: Effort normal and breath sounds normal.   Abdominal: Soft. Bowel sounds are normal. She exhibits distension. There is tenderness. There is guarding. There is no rebound.   Musculoskeletal: Normal range of motion.   Lymphadenopathy:     She has no cervical adenopathy.   Neurological: She is alert and oriented to person, place, and time.   Skin: Skin is warm and dry. Capillary refill takes less than 2 seconds.   Psychiatric: She has a normal mood and affect. Her behavior is normal. Judgment and thought content normal.   Nursing note and vitals reviewed.      Significant Labs:   Recent Lab Results       02/02/20  0630        Albumin 3.1     Alkaline Phosphatase 54     ALT 29     Anion Gap 8     AST 27     Baso # 0.03     Basophil% 1.0     BILIRUBIN TOTAL 0.4  Comment:  For infants and newborns, interpretation of results should be based  on gestational age, weight and in agreement with clinical  observations.  Premature Infant recommended reference ranges:  Up to 24  hours.............<8.0 mg/dL  Up to 48 hours............<12.0 mg/dL  3-5 days..................<15.0 mg/dL  6-29 days.................<15.0 mg/dL       BUN, Bld 5     Calcium 8.0     Chloride 106     CO2 23     Creatinine 1.0     Differential Method Automated     eGFR if African American >60.0     eGFR if non  >60.0  Comment:  Calculation used to obtain the estimated glomerular filtration  rate (eGFR) is the CKD-EPI equation.        Eos # 0.1     Eosinophil% 4.5     Glucose 117     Gran # (ANC) 1.3     Gran% 45.8     Hematocrit 29.9     Hemoglobin 10.1     Immature Grans (Abs) 0.02  Comment:  Mild elevation in immature granulocytes is non specific and   can be seen in a variety of conditions including stress response,   acute inflammation, trauma and pregnancy. Correlation with other   laboratory and clinical findings is essential.       Immature Granulocytes 0.7     Lymph # 1.1     Lymph% 37.0     MCH 29.9     MCHC 33.8     MCV 89     Mono # 0.3     Mono% 11.0     MPV 10.4     nRBC 0     Platelets 199     Potassium 3.3     PROTEIN TOTAL 5.3     RBC 3.38     RDW 13.0     Sodium 137     WBC 2.92         All pertinent labs within the past 24 hours have been reviewed.    Significant Imaging: I have reviewed and interpreted all pertinent imaging results/findings within the past 24 hours.      Assessment/Plan:      * Acute kidney injury  01/30/2020:  · Admit to medical-surgical unit and begin IV fluids  · Obtain labs to include CBC CMP amylase lipase and urinalysis.  · Begin empiric antibiotics of ciprofloxacin 400 mg IV twice daily and metronidazole 500 mg 3 times daily  · Treat symptomatically as needed with Zofran and pain medicine as needed.  · Rehydrate overnight and plan CT scan of the abdomen pelvis with contrast in the a.m..    01/31/2020:  · Acute kidney injury has improved.  Patient continues to complain of upper quadrants pains and epigastric pains  · Will do CT of the abdomen and pelvis with  contrast this morning  · If CT scan negative will discharge in the a.m..  · Repeat labs in the a.m. to include CBC and CMP  · Treat symptomatically otherwise.    02/01/2020:  · Patient is stable but still having significant amount of pain with palpation and decreased bowel sounds in the left lower quadrant.  · CT scan does corroborate physical findings with thickening of the bowel wall suggestive of a gastroenteritis.      Gastroenteritis  02/01/2020:  Continue current antibiotics  Follow up CBC and CMP in the a.m.  Continue other medications as prescribed.  Begin sucralfate 1 g q.6 hours    2/2/2020:  Consult General Surgery in the AM to evaluate for Colonoscopy  Repleat potassium  Repeat labs and bleeding times in AM      Gastroesophageal reflux disease  Will treat for gastroesophageal reflux disease by Protonix      COPD (chronic obstructive pulmonary disease)  COPD stable and will continue nebulization treatments        VTE Risk Mitigation (From admission, onward)         Ordered     heparin (porcine) injection 5,000 Units  Every 8 hours      01/30/20 1555     IP VTE HIGH RISK PATIENT  Once      01/30/20 5953                      Wyatt Gurrola MD  Department of Hospital Medicine   [facility]

## 2020-02-03 NOTE — DISCHARGE SUMMARY
[facility] Medicine  Discharge Summary      Patient Name: Clara Dotson  MRN: [id number]  Admission Date: 1/30/2020  Hospital Length of Stay: 0 days  Discharge Date and Time: 2/3/2020     Attending Physician: Wyatt Gurrola MD   Discharging Provider: Wyatt Gurrola MD  Primary Care Provider: Chai Adamson MD      HPI:   Patient is a 54-year-old female that visited her primary care physician today and was complaining of 2 day history of nausea vomiting diarrhea and abdominal pain. Patient stated that she made some CABG on Tuesday and approximately 4-6 hours after she ate this she began to have nausea and vomiting with abdominal pain and subsequently diarrhea.  She denies any fever but states that she has had subjective chills and mid to lower abdominal pain since.  She presented to the Primary Care Clinic today stating that she could not keep anything down and appeared clinically dehydrated.  Patient has a past medical history significant for breast cancer, anemia and skin cancer.  Past surgical history includes cholecystectomy, hysterectomy and bilateral mastectomy.  Patient admitted for acute kidney injury and dehydration.    * No surgery found *      Hospital Course:   01/31/2020:  · Patient was admitted yesterday with nausea vomiting and diarrhea.  No further diarrhea nausea or vomiting overnight.  However patient states that she feels better although she still has epigastric abdominal pain.  Patient has had a cholecystectomy in the past.  Labs this morning showed no significant abnormality except for potassium of 3.0 chloride 112 bicarb 20 and GFR greater than 60.  BUN and creatinine this morning was 14 in 1.0.  Patient will be sent for CT scan of the abdomen pelvis today with contrast.  This will be followed and if normal patient will be discharged tomorrow with diagnosis of acute gastroenteritis.    02/01/2020:  · CT scan of the abdomen pelvis yesterday with  contrast revealed transverse and left-sided::  Thickening suggestive of acute gastroenteritis.  Inflammatory bowel disease cannot be ruled out.  Patient will be kept until Monday for continuing IV antibiotic therapy as well as surgical consult to did patient recently had endoscopy without difficulty.  · Labs otherwise revealed low potassium at 3.0 GFR greater than 60 and other chemistry unremarkable  · Vital signs have been stable without abnormality.  Patient has been afebrile.  Blood pressure is now normal    2/2/2020:  Patient remains with lower abdominal cramping  Labs:  Potassium 3.3, otherwise unremarkable  WBC: 2.92 H/H stable  Continue current antibiotics, pain control  Consult General Surgery in the AM    02/03/2020:  Patient is stable with much less abdominal pain. Patient states that some lower abdominal pain remains but it is nearly resolved.  Vital signs are stable with normal blood pressure patient been afebrile and other vitals have been normal.  Labs this morning showed normal white blood cell count normal differential and chemistry showed sodium 142 potassium 4.0 chloride normal at 107 BUN creatinine were 6 and 1.0 GFR greater than 60     Consults:   Consults (From admission, onward)        Status Ordering Provider     Inpatient consult to General Surgery  Once     Provider:  Orestes Alvarado MD    Completed ISIDRO TENORIO          * Acute kidney injury  01/30/2020:  · Admit to medical-surgical unit and begin IV fluids  · Obtain labs to include CBC CMP amylase lipase and urinalysis.  · Begin empiric antibiotics of ciprofloxacin 400 mg IV twice daily and metronidazole 500 mg 3 times daily  · Treat symptomatically as needed with Zofran and pain medicine as needed.  · Rehydrate overnight and plan CT scan of the abdomen pelvis with contrast in the a.m..    01/31/2020:  · Acute kidney injury has improved.  Patient continues to complain of upper quadrants pains and epigastric pains  · Will do CT  of the abdomen and pelvis with contrast this morning  · If CT scan negative will discharge in the a.m..  · Repeat labs in the a.m. to include CBC and CMP  · Treat symptomatically otherwise.    02/01/2020:  · Patient is stable but still having significant amount of pain with palpation and decreased bowel sounds in the left lower quadrant.  · CT scan does corroborate physical findings with thickening of the bowel wall suggestive of a gastroenteritis.    02/03/2020:  · Discharge to home  · Continue current meds prescribed home  · Librax 1 p.o. b.i.d. p.r.n.  · Ciprofloxacin 500 mg p.o. b.i.d. for 7 more days  · Zofran 4 mg p.o. q.12 hours p.r.n. nausea  · Follow up with primary care physician within 1 week for recheck  · Follow-up with Dr. Alvarado in 8 weeks in office for scheduling colonoscopy      Gastroenteritis  02/01/2020:  Continue current antibiotics  Follow up CBC and CMP in the a.m.  Continue other medications as prescribed.  Begin sucralfate 1 g q.6 hours    2/2/2020:  Consult General Surgery in the AM to evaluate for Colonoscopy  Repleat potassium  Repeat labs and bleeding times in AM    02/23/2020:  Discharge to home  Continue home medications  Follow up with Dr. Alvarado in 8 weeks in his office for scheduling colonoscopy        Final Active Diagnoses:    Diagnosis Date Noted POA    PRINCIPAL PROBLEM:  Acute kidney injury [N17.9] 01/30/2020 Yes    Gastroenteritis [K52.9] 02/01/2020 Yes    Gastroesophageal reflux disease [K21.9] 10/16/2019 Yes    COPD (chronic obstructive pulmonary disease) [J44.9] 10/02/2018 Yes      Problems Resolved During this Admission:       Discharged Condition: stable    Disposition:     Follow Up:    Patient Instructions:   No discharge procedures on file.    Significant Diagnostic Studies: Labs: All labs within the past 24 hours have been reviewed    Pending Diagnostic Studies:     Procedure Component Value Units Date/Time    Calprotectin [[id number]] Collected:  01/31/20  1204    Order Status:  Sent Lab Status:  In process Updated:  01/31/20 1320    Specimen:  Stool     Fecal fat, qualitative [[id number]] Collected:  01/31/20 1204    Order Status:  Sent Lab Status:  In process Updated:  01/31/20 1321    Specimen:  Stool     H. pylori antigen, stool [[id number]] Collected:  01/31/20 1204    Order Status:  Sent Lab Status:  In process Updated:  01/31/20 2027    Specimen:  Stool     Pancreatic elastase, fecal [[id number]] Collected:  01/31/20 1204    Order Status:  Sent Lab Status:  In process Updated:  01/31/20 1321    Specimen:  Stool          Medications:  Reconciled Home Medications:      Medication List      START taking these medications    chlordiazepoxide-clidinium 5-2.5 mg 5-2.5 mg Cap  Commonly known as:  LIBRAX  Take 1 capsule by mouth 3 (three) times daily with meals.     ciprofloxacin HCl 500 MG tablet  Commonly known as:  CIPRO  Take 1 tablet (500 mg total) by mouth 2 (two) times daily. for 10 days     sucralfate 1 gram tablet  Commonly known as:  CARAFATE  Take 1 tablet (1 g total) by mouth 4 (four) times daily.        CHANGE how you take these medications    * ondansetron 8 MG tablet  Commonly known as:  ZOFRAN  Take 1 tablet (8 mg total) by mouth every 8 (eight) hours as needed for Nausea.  What changed:  Another medication with the same name was added. Make sure you understand how and when to take each.     * ondansetron 4 MG tablet  Commonly known as:  ZOFRAN  Take 1 tablet (4 mg total) by mouth every 6 (six) hours.  What changed:  You were already taking a medication with the same name, and this prescription was added. Make sure you understand how and when to take each.         * This list has 2 medication(s) that are the same as other medications prescribed for you. Read the directions carefully, and ask your doctor or other care provider to review them with you.            CONTINUE taking these medications    aspirin 325 MG tablet  Take 325 mg by mouth once daily.      enalapril 5 MG tablet  Commonly known as:  VASOTEC  TAKE ONE TABLET BY MOUTH EVERY DAY **THANK YOU**     ezetimibe 10 mg tablet  Commonly known as:  ZETIA  TAKE ONE TABLET BY MOUTH EVERY DAY **THANK YOU**     gabapentin 300 MG capsule  Commonly known as:  NEURONTIN  TAKE ONE CAPSULE BY MOUTH THREE TIMES DAILY **THANK YOU**     HYDROcodone-acetaminophen  mg per tablet  Commonly known as:  NORCO  every 6 (six) hours as needed.     isosorbide mononitrate 60 MG 24 hr tablet  Commonly known as:  IMDUR  TAKE ONE TABLET BY MOUTH EVERY DAY **THANK YOU**     nitroGLYCERIN 0.4 MG SL tablet  Commonly known as:  NITROSTAT  DISSOLVE 1 TABLET UNDER THE TONGUE FOR CHEST PAIN MAY REPEAT EVERY FIVE MINUTES FOR NO MORE THAN 3 DOSES **THANK YOU**     omeprazole 20 MG capsule  Commonly known as:  PRILOSEC  Take 1 capsule (20 mg total) by mouth once daily.     tiZANidine 4 MG tablet  Commonly known as:  ZANAFLEX  Take 4 mg by mouth every 6 (six) hours as needed.     Ventolin HFA 90 mcg/actuation inhaler  Generic drug:  albuterol  inhale TWO puffs into lungs EVERY 6 HOURS AS NEEDED FOR WHEEZING THANK YOU     Vitamin C 100 MG tablet  Generic drug:  ascorbic acid (vitamin C)  Take 100 mg by mouth once daily.     vitamin D 1000 units Tab  Commonly known as:  VITAMIN D3  Take 1,000 Units by mouth once daily.     vitamin E 1000 UNIT capsule  Take 1,000 Units by mouth once daily.        ASK your doctor about these medications    tiotropium 18 mcg inhalation capsule  Commonly known as:  Spiriva with HandiHaler  Inhale 1 capsule (18 mcg total) into the lungs once daily. Controller            Indwelling Lines/Drains at time of discharge:   Lines/Drains/Airways     None                 Time spent on the discharge of patient: 45 minutes  Patient was seen and examined on the date of discharge and determined to be suitable for discharge.         Wyatt Gurrola MD  Department of Hospital Medicine  [facility]

## 2020-02-03 NOTE — ASSESSMENT & PLAN NOTE
02/01/2020:  Continue current antibiotics  Follow up CBC and CMP in the a.m.  Continue other medications as prescribed.  Begin sucralfate 1 g q.6 hours    2/2/2020:  Consult General Surgery in the AM to evaluate for Colonoscopy  Repleat potassium  Repeat labs and bleeding times in AM

## 2020-02-04 ENCOUNTER — PATIENT OUTREACH (OUTPATIENT)
Dept: ADMINISTRATIVE | Facility: HOSPITAL | Age: 55
End: 2020-02-04

## 2020-02-04 LAB
BACTERIA BLD CULT: NORMAL
BACTERIA BLD CULT: NORMAL

## 2020-02-04 NOTE — LETTER
February 4, 2020    Clara Dotson  [id number]  [place] [address]             [facility] [address] [facility] [address]  Phone: [phone] Dear Clara    [facility] is committed to your overall health and would like to ensure that you are up to date on your recommended test and/or procedures.   Chai Adamson MD  has found that your chart shows you may be due for the following:    Health Maintenance Due   Topic Date Due    Hepatitis C Screening  1965    HIV Screening  04/25/1979    TETANUS VACCINE  04/25/1982    Mammogram  04/25/2004    Shingles Vaccine (1 of 2) 04/25/2014    Pneumococcal Vaccine (Highest Risk) (2 of 3 - PPSV23) 12/06/2019     If you have had any of the above done at another facility, please let us know so that we may obtain copies from that facility.  If you have a copy of these records, please provide a copy for us to scan into your chart.  You are welcome to request that the report be faxed to us at  ([phone]).     Otherwise, please schedule these appointments at your earliest convenience by calling [phone] or going to [email or web address].    If you have an upcoming scheduled appointment for the item above, please disregard this letter.    Sincerely,  Your [facility]  MD Mandy José L.P.N. Clinical Care Coordinator  [address] [place] [address], [place] [id number] [phone]

## 2020-02-05 LAB — H PYLORI AG STL QL IA: NOT DETECTED

## 2020-02-24 ENCOUNTER — TELEPHONE (OUTPATIENT)
Dept: GASTROENTEROLOGY | Facility: CLINIC | Age: 55
End: 2020-02-24

## 2020-02-24 NOTE — TELEPHONE ENCOUNTER
----- Message from Lena Lee sent at 2/24/2020  1:10 PM CST -----  Contact: Patient  Reason For Call: Patient has a referral in and would like to schedule appt . EMS/EGD      Contact: [phone]

## 2020-02-24 NOTE — TELEPHONE ENCOUNTER
MA returned patient's call. She was given the number to our endoscopy schedulers to schedule her procedure.

## 2020-02-24 NOTE — TELEPHONE ENCOUNTER
----- Message from Lena Hess sent at 2/24/2020 11:35 AM CST -----  Contact: patient  Type: Needs Medical Advice    Who Called:  Patient  Best Call Back Number:   Additional Information: Per patient requesting a call back regarding referral paperwork she misplaced for her Gastro referral-please advise-thank you

## 2020-02-24 NOTE — TELEPHONE ENCOUNTER
Returned pt phone call. Pt stated she hasn't received a phone call from Dr. Whitman's office to set up an EMS. Pt given phone number for provider.

## 2020-03-03 ENCOUNTER — PATIENT OUTREACH (OUTPATIENT)
Dept: ADMINISTRATIVE | Facility: OTHER | Age: 55
End: 2020-03-03

## 2020-03-03 DIAGNOSIS — Z12.31 ENCOUNTER FOR SCREENING MAMMOGRAM FOR MALIGNANT NEOPLASM OF BREAST: Primary | ICD-10-CM

## 2020-03-04 DIAGNOSIS — E78.5 HYPERLIPIDEMIA, UNSPECIFIED HYPERLIPIDEMIA TYPE: ICD-10-CM

## 2020-03-04 DIAGNOSIS — I10 HYPERTENSION, UNSPECIFIED TYPE: ICD-10-CM

## 2020-03-05 RX ORDER — EZETIMIBE 10 MG/1
TABLET ORAL
Qty: 30 TABLET | Refills: 1 | Status: SHIPPED | OUTPATIENT
Start: 2020-03-05 | End: 2020-04-29

## 2020-03-05 RX ORDER — ISOSORBIDE MONONITRATE 60 MG/1
TABLET, EXTENDED RELEASE ORAL
Qty: 30 TABLET | Refills: 1 | Status: SHIPPED | OUTPATIENT
Start: 2020-03-05 | End: 2020-04-29

## 2020-03-05 RX ORDER — ENALAPRIL MALEATE 5 MG/1
TABLET ORAL
Qty: 30 TABLET | Refills: 1 | Status: SHIPPED | OUTPATIENT
Start: 2020-03-05 | End: 2020-04-29

## 2020-03-28 NOTE — PROGRESS NOTES
[facility] Hematology/Oncology  PROGRESS NOTE - Telemedicine Visit      Subjective:       Patient ID:   NAME: Clara Dotson : 1965     54 y.o. female    Referring Doc: Dave  Other Physicians: Lucila (new card), Juan Diego (PCP-Jaquelin); Kalina (Neuro); Bhaskar    Chief Complaint:  breast ca  f/u    History of Present Illness:     Patient is being seen today via a telemedicine follow-up visit in lieu of a normal in-person visit due to the recent COVID19 outbreak. The patient is currently located at home. This visit type is a virtual visit with synchronous audio and/or video.    The patient is her by herself. She is doing ok.  She denies any current CP, SOB,HA's or N/V. She has chronic SOB and LYNCH and is now on two different inhalers. Breathing stable.      She noticed a small bean-sized nodule on the right breast about a month. Some mild tenderness.       ROS:   GEN: normal without any fever, night sweats or weight loss  HEENT: normal with no HA's, sore throat, stiff neck, changes in vision  CV: normal with no CP, SOB, PND, LYNCH or orthopnea  PULM: chronic stable SOB  GI: normal with no abdominal pain, nausea, vomiting, constipation, diarrhea, melanotic stools,  BRBPR, or hematemesis; some dysphagia with solids (stable)  : normal with no hematuria, dysuria  BREAST: small nodule on right breast  SKIN: normal with no rash, erythema, bruising, or swelling    Allergies:  Review of patient's allergies indicates:   Allergen Reactions    Pcn [penicillins] Hives       Medications:    Current Outpatient Medications:     ascorbic acid, vitamin C, (VITAMIN C) 100 MG tablet, Take 100 mg by mouth once daily., Disp: , Rfl:     aspirin 325 MG tablet, Take 325 mg by mouth once daily., Disp: , Rfl:     chlordiazepoxide-clidinium 5-2.5 mg (LIBRAX) 5-2.5 mg Cap, Take 1 capsule by mouth 3 (three) times daily with meals., Disp: 90 capsule, Rfl: 2    enalapril (VASOTEC) 5 MG tablet, TAKE ONE TABLET BY MOUTH EVERY DAY **THANK  YOU**, Disp: 30 tablet, Rfl: 1    ezetimibe (ZETIA) 10 mg tablet, TAKE ONE TABLET BY MOUTH EVERY DAY **THANK YOU**, Disp: 30 tablet, Rfl: 1    gabapentin (NEURONTIN) 300 MG capsule, TAKE ONE CAPSULE BY MOUTH THREE TIMES DAILY **THANK YOU**, Disp: 90 capsule, Rfl: 3    hydrocodone-acetaminophen 10-325mg (NORCO)  mg Tab, every 6 (six) hours as needed. , Disp: , Rfl:     isosorbide mononitrate (IMDUR) 60 MG 24 hr tablet, TAKE ONE TABLET BY MOUTH EVERY DAY **THANK YOU**, Disp: 30 tablet, Rfl: 1    nitroGLYCERIN (NITROSTAT) 0.4 MG SL tablet, DISSOLVE 1 TABLET UNDER THE TONGUE FOR CHEST PAIN MAY REPEAT EVERY FIVE MINUTES FOR NO MORE THAN 3 DOSES **THANK YOU**, Disp: 25 tablet, Rfl: 5    omeprazole (PRILOSEC) 20 MG capsule, Take 1 capsule (20 mg total) by mouth once daily., Disp: 90 capsule, Rfl: 3    ondansetron (ZOFRAN) 4 MG tablet, Take 1 tablet (4 mg total) by mouth every 6 (six) hours., Disp: 12 tablet, Rfl: 1    ondansetron (ZOFRAN) 8 MG tablet, Take 1 tablet (8 mg total) by mouth every 8 (eight) hours as needed for Nausea., Disp: 30 tablet, Rfl: 0    sucralfate (CARAFATE) 1 gram tablet, Take 1 tablet (1 g total) by mouth 4 (four) times daily., Disp: 120 tablet, Rfl: 2    tiotropium (SPIRIVA WITH HANDIHALER) 18 mcg inhalation capsule, Inhale 1 capsule (18 mcg total) into the lungs once daily. Controller, Disp: 30 capsule, Rfl: 5    tiZANidine (ZANAFLEX) 4 MG tablet, Take 4 mg by mouth every 6 (six) hours as needed., Disp: , Rfl:     VENTOLIN HFA 90 mcg/actuation inhaler, inhale TWO puffs into lungs EVERY 6 HOURS AS NEEDED FOR WHEEZING THANK YOU, Disp: , Rfl: 6    vitamin D (VITAMIN D3) 1000 units Tab, Take 1,000 Units by mouth once daily., Disp: , Rfl:     vitamin E 1000 UNIT capsule, Take 1,000 Units by mouth once daily., Disp: , Rfl:     Current Facility-Administered Medications:     cyanocobalamin injection 100 mcg, 100 mcg, Intramuscular, Q30 Days, Israel Neely MD, 1,000 mcg at  06/02/18 0945    PMHx/PSHx Updates:  See patient's last visit with me on 9/30/2019  See H&P in Vol #1        Pathology:  See Vol #1          Objective:     Vitals:  Afebrile  130lbs    Physical Examination:   GEN: no apparent distress, comfortable; AAOx3  HEAD: atraumatic and normocephalic  EYES: no pallor, no icterus, PERRLA  ENT: OMM, no pharyngeal erythema, external ears WNL; no nasal discharge; no thrush  NECK: no masses, thyroid normal, trachea midline, no LAD/LN's, supple  CV: RRR with no murmur; normal pulse; normal S1 and S2; no pedal edema  CHEST: Normal respiratory effort; CTAB; normal breath sounds; no wheeze or crackles  ABDOM: nontender and nondistended; soft; normal bowel sounds; no rebound/guarding  MUSC/Skeletal: ROM normal; no crepitus; joints normal; no deformities or arthropathy  EXTREM: no clubbing, cyanosis, inflammation or swelling  SKIN: no rashes, lesions, ulcers, petechiae or subcutaneous nodules; tattoos  : no keen  NEURO: grossly intact; motor/sensory WNL; AAOx3; no tremors  PSYCH: normal mood, affect and behavior  LYMPH: normal cervical, supraclavicular, axillary and groin LN's  Breast: small nodule on right breast; bilateral mastectomies          Labs:   2/3/2020  Lab Results   Component Value Date    WBC 4.92 02/03/2020    HGB 11.6 (L) 02/03/2020    HCT 34.4 (L) 02/03/2020    MCV 88 02/03/2020     02/03/2020          BMP  Lab Results   Component Value Date     02/03/2020    K 4.0 02/03/2020     02/03/2020    CO2 24 02/03/2020    BUN 6 02/03/2020    CREATININE 1.0 02/03/2020    CALCIUM 8.5 (L) 02/03/2020    ANIONGAP 9 02/03/2020    ESTGFRAFRICA >60.0 02/03/2020    EGFRNONAA >60.0 02/03/2020     Lab Results   Component Value Date    ALT 50 (H) 02/03/2020    AST 69 (H) 02/03/2020    ALKPHOS 58 02/03/2020    BILITOT 0.4 02/03/2020         Radiology/Diagnostic Studies:    CT abdom/pelvis  1/31/2020:    Impression       Mild wall thickening in the right and transverse  colon and in the distal small bowel, suggesting an enteritis/colitis.  Inflammatory bowel disease is a differential consideration.    No abscess, perforation, obstruction.    No evidence of metastatic malignancy.             PET/CT Fusion 6/4/2018:  IMPRESSION:    1. Negative for recurrent malignancy or metastatic disease.    2. Development of moderate hepatic steatosis.        CXR 5/31/2017 negative    I have reviewed all available lab results and radiology reports.    Assessment/Plan:     (1) 54 y.o. female with diagnosis of right breast cancer  - diagnosed in 2004  - Stage II  - s/p bilateral mastectomies  - followed by Dr Acosta with Gen Surgery  - s/p AC x4 and XRT  - triple negative  - CT scans in 3/2016  - PET on 6/4/2018 - negative for recurrent cancer     (2) hx/of TIA's and PAD  - followed by Dr Whitlock with cardiology and now by Dr Gaytan     (3) COPD and former smoker  - followed by Dr Wolff with pulm in past  - she is on two separate inhalers     (4) Right inferior auricular biopsy in Nov 2013 - atypical single unit junctional melanocytic hyperplasia with no evidence of malignancy  - Dr Acosta following       (5) Arthritic issues    (6) Skin cancer issues - followed by Dr Manley (Derm)    (7) Lower extremity issues - she is seeing Dr Gilman/René with Neurology at [place] and she had a nerve conduction study and she plans to see ortho in future    (8) Chronic pain syndrome - followed by Pain management - Dr Barajas    (9) Chronic dysphagia and hx/of hepatic steatosis - s/p prior colonoscopy with Dr Muro    (10) Recent bout of N/V in Jan 2020 - she had CT scans with some thickening of then colon wall  - she need to have a colonoscopy in near future            1. Malignant neoplasm of upper-inner quadrant of right breast in female, estrogen receptor negative     2. Estrogen receptor negative tumor status     3. Personal history of tobacco use, presenting hazards to health             PLAN:  1. F/u  with PCP, card, pulm, dermatology, neuro and Gen Surg  2. Check labs periodically  3. F/U with GI about the dysphagia and colon wall thickening on CT - she needs a colonoscopy in future  4. Schedule MRI of breast, US of breast and CXR  4. RTC in 2 months    Fax note to Chai Adamson (PCP), Bijan Acosta [place], Mishra; New/Bhaskar Shah    Total Time spent on patient:    I spent over 25 mins of time with the patient. Reviewed results of the recently ordered labs, tests, reports and studies; made directives with regards to the results. Over half of this time was spent couseling and coordinating care, making treatment and analytical decisions; ordering necessary labs, tests and studies; and discussing treatment options and setting up treatment plan(s) if indicated.      Discussion:       COVID-19 Discussion:    I had long discussion with patient and any applicable family about the COVID-19 coronavirus epidemic and the recommended precautions with regard to cancer and/or hematology patients. I have re-iterated the CDC recommendations for adequate hand washing, use of hand -like products, and coughing into elbow, etc. In addition, especially for our patients who are on chemotherapy and/or our otherwise immunocompromised patients, I have recommended avoidance of crowds, including movie theaters, restaurants, churches, etc. I have recommended avoidance of any sick or symptomatic family members and/or friends. I have also recommended avoidance of any raw and unwashed food products, and general avoidance of food items that have not been prepared by themselves. The patient has been asked to call us immediately with any symptom developments, issues, questions or other general concerns.       Telemedicine Statement:    The patient acknowledged and agreed to the audio/video encounter and the patient who is being provided medical services by telemedicine is:  (1) informed of the relationship between the  physician and patient and the respective role of any other health care provider with respect to management of the patient; and (2) notified that he or she may decline to receive medical services by telemedicine and may withdraw from such care at any time.    I have explained all of the above in detail and the patient understands all of the current recommendation(s). I have answered all of their questions to the best of my ability and to their complete satisfaction.   The patient is to continue with the current management plan.            Electronically signed by Israel Neely MD

## 2020-03-30 ENCOUNTER — OFFICE VISIT (OUTPATIENT)
Dept: HEMATOLOGY/ONCOLOGY | Facility: CLINIC | Age: 55
End: 2020-03-30
Payer: MEDICARE

## 2020-03-30 DIAGNOSIS — Z17.1 MALIGNANT NEOPLASM OF UPPER-INNER QUADRANT OF RIGHT BREAST IN FEMALE, ESTROGEN RECEPTOR NEGATIVE: Primary | ICD-10-CM

## 2020-03-30 DIAGNOSIS — C50.211 MALIGNANT NEOPLASM OF UPPER-INNER QUADRANT OF RIGHT BREAST IN FEMALE, ESTROGEN RECEPTOR NEGATIVE: Primary | ICD-10-CM

## 2020-03-30 DIAGNOSIS — N63.0 SUBCUTANEOUS NODULE OF BREAST: ICD-10-CM

## 2020-03-30 DIAGNOSIS — Z17.1 ESTROGEN RECEPTOR NEGATIVE TUMOR STATUS: ICD-10-CM

## 2020-03-30 DIAGNOSIS — Z87.891 PERSONAL HISTORY OF TOBACCO USE, PRESENTING HAZARDS TO HEALTH: ICD-10-CM

## 2020-03-30 PROCEDURE — 99214 PR OFFICE/OUTPT VISIT, EST, LEVL IV, 30-39 MIN: ICD-10-PCS | Mod: GT,,, | Performed by: INTERNAL MEDICINE

## 2020-03-30 PROCEDURE — 99214 OFFICE O/P EST MOD 30 MIN: CPT | Mod: GT,,, | Performed by: INTERNAL MEDICINE

## 2020-04-08 ENCOUNTER — PATIENT MESSAGE (OUTPATIENT)
Dept: FAMILY MEDICINE | Facility: CLINIC | Age: 55
End: 2020-04-08

## 2020-04-09 ENCOUNTER — TELEPHONE (OUTPATIENT)
Dept: HEMATOLOGY/ONCOLOGY | Facility: CLINIC | Age: 55
End: 2020-04-09

## 2020-04-09 NOTE — TELEPHONE ENCOUNTER
I spoke to the pt about changing her appointment to a virtual visit. She is having trouble downloading the symone on her phone. She is going to try again when she gets home and stated she will call if she has any problems. She also wanted to reschedule her appointment because her CXR, US and MRI were scheduled for 4/14/02. She will be rescheduled for a later date.

## 2020-04-14 ENCOUNTER — HOSPITAL ENCOUNTER (OUTPATIENT)
Dept: RADIOLOGY | Facility: HOSPITAL | Age: 55
Discharge: HOME | End: 2020-04-14
Attending: INTERNAL MEDICINE
Payer: MEDICARE

## 2020-04-14 ENCOUNTER — TELEPHONE (OUTPATIENT)
Dept: HEMATOLOGY/ONCOLOGY | Facility: CLINIC | Age: 55
End: 2020-04-14

## 2020-04-14 VITALS — HEIGHT: 64 IN | BODY MASS INDEX: 22.55 KG/M2 | WEIGHT: 132.06 LBS

## 2020-04-14 DIAGNOSIS — Z17.1 MALIGNANT NEOPLASM OF UPPER-INNER QUADRANT OF RIGHT BREAST IN FEMALE, ESTROGEN RECEPTOR NEGATIVE: ICD-10-CM

## 2020-04-14 DIAGNOSIS — Z17.1 MALIGNANT NEOPLASM OF UPPER-INNER QUADRANT OF RIGHT BREAST IN FEMALE, ESTROGEN RECEPTOR NEGATIVE: Primary | ICD-10-CM

## 2020-04-14 DIAGNOSIS — C50.211 MALIGNANT NEOPLASM OF UPPER-INNER QUADRANT OF RIGHT BREAST IN FEMALE, ESTROGEN RECEPTOR NEGATIVE: Primary | ICD-10-CM

## 2020-04-14 DIAGNOSIS — C50.211 MALIGNANT NEOPLASM OF UPPER-INNER QUADRANT OF RIGHT BREAST IN FEMALE, ESTROGEN RECEPTOR NEGATIVE: ICD-10-CM

## 2020-04-14 DIAGNOSIS — Z17.1 ESTROGEN RECEPTOR NEGATIVE TUMOR STATUS: ICD-10-CM

## 2020-04-14 DIAGNOSIS — N63.0 SUBCUTANEOUS NODULE OF BREAST: ICD-10-CM

## 2020-04-14 PROCEDURE — 76642 ULTRASOUND BREAST LIMITED: CPT | Mod: TC,50,PO

## 2020-04-14 PROCEDURE — 71046 X-RAY EXAM CHEST 2 VIEWS: CPT | Mod: TC,PO

## 2020-04-14 PROCEDURE — 77066 DX MAMMO INCL CAD BI: CPT | Mod: TC,PO

## 2020-04-14 NOTE — TELEPHONE ENCOUNTER
----- Message from Israel Neely MD sent at 4/14/2020  2:01 PM CDT -----  Call her with reports - it all looks stable - see if she wants to go see her Gen Surgery about her breasts

## 2020-04-15 ENCOUNTER — TELEPHONE (OUTPATIENT)
Dept: HEMATOLOGY/ONCOLOGY | Facility: CLINIC | Age: 55
End: 2020-04-15

## 2020-04-15 ENCOUNTER — TELEPHONE (OUTPATIENT)
Dept: ENDOSCOPY | Facility: HOSPITAL | Age: 55
End: 2020-04-15

## 2020-04-15 ENCOUNTER — PATIENT OUTREACH (OUTPATIENT)
Dept: ADMINISTRATIVE | Facility: HOSPITAL | Age: 55
End: 2020-04-15

## 2020-04-15 NOTE — PROGRESS NOTES
[facility] Hematology/Oncology  PROGRESS NOTE - Telemedicine Visit      Subjective:       Patient ID:   NAME: Clara Dotson : 1965     54 y.o. female    Referring Doc: Dave  Other Physicians: Lucila (new card), Juan Diego (PCP-Jaquelin); Kalina (Neuro); Bhaskar    Chief Complaint:  breast ca  f/u    History of Present Illness:     Patient is being seen today via a telemedicine follow-up visit in lieu of a normal in-person visit due to the recent COVID19 outbreak. The patient is currently located at home. This visit type is a virtual visit with synchronous audio with video not working properly due to poor reception.      The patient is her by herself. She is doing ok.  She denies any current CP, SOB,HA's or N/V. She has chronic SOB and LYNCH and is now on two different inhalers. Breathing stable.      She noticed a small bean-sized nodule on the right breast about a month ago. We ordered mammogram and US were normal.    She has some hearing issues and plans to see Dr Barajas with ENT in [facility] discussed COVID19 precautions      ROS:   GEN: normal without any fever, night sweats or weight loss  HEENT: normal with no HA's, sore throat, stiff neck, changes in vision; hearing issues  CV: normal with no CP, SOB, PND, LYNCH or orthopnea  PULM: chronic stable SOB  GI: normal with no abdominal pain, nausea, vomiting, constipation, diarrhea, melanotic stools,  BRBPR, or hematemesis;    : normal with no hematuria, dysuria  BREAST: small nodule on right breast  SKIN: normal with no rash, erythema, bruising, or swelling    Allergies:  Review of patient's allergies indicates:   Allergen Reactions    Pcn [penicillins] Hives       Medications:    Current Outpatient Medications:     ascorbic acid, vitamin C, (VITAMIN C) 100 MG tablet, Take 100 mg by mouth once daily., Disp: , Rfl:     aspirin 325 MG tablet, Take 325 mg by mouth once daily., Disp: , Rfl:     chlordiazepoxide-clidinium 5-2.5 mg (LIBRAX) 5-2.5 mg Cap,  Take 1 capsule by mouth 3 (three) times daily with meals., Disp: 90 capsule, Rfl: 2    enalapril (VASOTEC) 5 MG tablet, TAKE ONE TABLET BY MOUTH EVERY DAY **THANK YOU**, Disp: 30 tablet, Rfl: 1    ezetimibe (ZETIA) 10 mg tablet, TAKE ONE TABLET BY MOUTH EVERY DAY **THANK YOU**, Disp: 30 tablet, Rfl: 1    gabapentin (NEURONTIN) 300 MG capsule, TAKE ONE CAPSULE BY MOUTH THREE TIMES DAILY **THANK YOU**, Disp: 90 capsule, Rfl: 3    hydrocodone-acetaminophen 10-325mg (NORCO)  mg Tab, every 6 (six) hours as needed. , Disp: , Rfl:     isosorbide mononitrate (IMDUR) 60 MG 24 hr tablet, TAKE ONE TABLET BY MOUTH EVERY DAY **THANK YOU**, Disp: 30 tablet, Rfl: 1    nitroGLYCERIN (NITROSTAT) 0.4 MG SL tablet, DISSOLVE 1 TABLET UNDER THE TONGUE FOR CHEST PAIN MAY REPEAT EVERY FIVE MINUTES FOR NO MORE THAN 3 DOSES **THANK YOU**, Disp: 25 tablet, Rfl: 5    omeprazole (PRILOSEC) 20 MG capsule, Take 1 capsule (20 mg total) by mouth once daily., Disp: 90 capsule, Rfl: 3    ondansetron (ZOFRAN) 4 MG tablet, Take 1 tablet (4 mg total) by mouth every 6 (six) hours., Disp: 12 tablet, Rfl: 1    ondansetron (ZOFRAN) 8 MG tablet, Take 1 tablet (8 mg total) by mouth every 8 (eight) hours as needed for Nausea., Disp: 30 tablet, Rfl: 0    sucralfate (CARAFATE) 1 gram tablet, Take 1 tablet (1 g total) by mouth 4 (four) times daily., Disp: 120 tablet, Rfl: 2    tiotropium (SPIRIVA WITH HANDIHALER) 18 mcg inhalation capsule, Inhale 1 capsule (18 mcg total) into the lungs once daily. Controller, Disp: 30 capsule, Rfl: 5    tiZANidine (ZANAFLEX) 4 MG tablet, Take 4 mg by mouth every 6 (six) hours as needed., Disp: , Rfl:     VENTOLIN HFA 90 mcg/actuation inhaler, inhale TWO puffs into lungs EVERY 6 HOURS AS NEEDED FOR WHEEZING THANK YOU, Disp: , Rfl: 6    vitamin D (VITAMIN D3) 1000 units Tab, Take 1,000 Units by mouth once daily., Disp: , Rfl:     vitamin E 1000 UNIT capsule, Take 1,000 Units by mouth once daily., Disp: , Rfl:      Current Facility-Administered Medications:     cyanocobalamin injection 100 mcg, 100 mcg, Intramuscular, Q30 Days, Israel Neely MD, 1,000 mcg at 06/02/18 0945    PMHx/PSHx Updates:  See patient's last visit with me on 9/30/2019  See H&P in Vol #1        Pathology:  See Vol #1          Objective:     Vitals:  Afebrile  130lbs    Physical Examination:   GEN: no apparent distress, comfortable; AAOx3  HEAD: atraumatic and normocephalic  EYES: no pallor, no icterus, PERRLA  ENT: OMM, no pharyngeal erythema, external ears WNL; no nasal discharge; no thrush  NECK: no masses, thyroid normal, trachea midline, no LAD/LN's, supple  CV: RRR with no murmur; normal pulse; normal S1 and S2; no pedal edema  CHEST: Normal respiratory effort; CTAB; normal breath sounds; no wheeze or crackles  ABDOM: nontender and nondistended; soft; normal bowel sounds; no rebound/guarding  MUSC/Skeletal: ROM normal; no crepitus; joints normal; no deformities or arthropathy  EXTREM: no clubbing, cyanosis, inflammation or swelling  SKIN: no rashes, lesions, ulcers, petechiae or subcutaneous nodules; tattoos  : no keen  NEURO: grossly intact; motor/sensory WNL; AAOx3; no tremors  PSYCH: normal mood, affect and behavior  LYMPH: normal cervical, supraclavicular, axillary and groin LN's  Breast: small nodule on right breast; bilateral mastectomies          Labs:   2/3/2020  Lab Results   Component Value Date    WBC 4.92 02/03/2020    HGB 11.6 (L) 02/03/2020    HCT 34.4 (L) 02/03/2020    MCV 88 02/03/2020     02/03/2020          BMP  Lab Results   Component Value Date     02/03/2020    K 4.0 02/03/2020     02/03/2020    CO2 24 02/03/2020    BUN 6 02/03/2020    CREATININE 1.0 02/03/2020    CALCIUM 8.5 (L) 02/03/2020    ANIONGAP 9 02/03/2020    ESTGFRAFRICA >60.0 02/03/2020    EGFRNONAA >60.0 02/03/2020     Lab Results   Component Value Date    ALT 50 (H) 02/03/2020    AST 69 (H) 02/03/2020    ALKPHOS 58 02/03/2020     BILITOT 0.4 02/03/2020         Radiology/Diagnostic Studies:    Mammo and bilat US 4/14/2020:    FINDINGS:  Breast Density: Amost entirely fat.     No suspicious mass, microcalcification, or architectural distortion.     Bilateral breast ultrasound     Sonographic evaluation of the palpable area concern in the 3 o'clock position the right breast shows no discrete solid or cystic mass.     Sonographic evaluation of the 10 o'clock position left breast shows no discrete solid or cystic mass.     Impression:     Bilateral mastectomies with tram reconstruction.  There is no mammographic or sonographic abnormality in either breast.     Recommendation: Annual screening mammography.     BI-RADS CATEGORY: 1  NEGATIVE        CXR 4/14/2020:    Impression       No acute pulmonary process             CT abdom/pelvis  1/31/2020:    Impression       Mild wall thickening in the right and transverse colon and in the distal small bowel, suggesting an enteritis/colitis.  Inflammatory bowel disease is a differential consideration.    No abscess, perforation, obstruction.    No evidence of metastatic malignancy.             PET/CT Fusion 6/4/2018:  IMPRESSION:    1. Negative for recurrent malignancy or metastatic disease.    2. Development of moderate hepatic steatosis.        CXR 5/31/2017 negative    I have reviewed all available lab results and radiology reports.    Assessment/Plan:     (1) 54 y.o. female with diagnosis of right breast cancer  - diagnosed in 2004  - Stage II  - s/p bilateral mastectomies  - followed by Dr Acosta with Gen Surgery  - s/p AC x4 and XRT  - triple negative  - CT scans in 3/2016  - PET on 6/4/2018 - negative for recurrent cancer  - repeat mammogram and US on 4/14/2020 negative and CXR was normal as well  - will refer her to see Dr Acosta for the area she has a concern for on the breast     (2) hx/of TIA's and PAD  - followed by Dr Whitlock with cardiology and now by Dr Gaytan     (3) COPD and former  smoker  - followed by Dr Wolff with pulm in past  - she is on two separate inhalers     (4) Right inferior auricular biopsy in Nov 2013 - atypical single unit junctional melanocytic hyperplasia with no evidence of malignancy  - Dr Acosta following       (5) Arthritic issues    (6) Skin cancer issues - followed by Dr Manley (Derm)    (7) Lower extremity issues - she is seeing Dr Gilman/René with Neurology at [place] and she had a nerve conduction study and she plans to see ortho in future    (8) Chronic pain syndrome - followed by Pain management - Dr Barajas    (9) Chronic dysphagia and hx/of hepatic steatosis - s/p prior colonoscopy with Dr Muro    (10) Recent bout of N/V in Jan 2020 - she had CT scans with some thickening of then colon wall  - she need to have a colonoscopy in near future     (11) Hearing issues - she plans to see Dr Barajas with ENT in near future in [facility]           1. Malignant neoplasm of upper-inner quadrant of right breast in female, estrogen receptor negative     2. Estrogen receptor negative tumor status     3. Personal history of tobacco use, presenting hazards to health             PLAN:  1. F/u with PCP, card, pulm, dermatology, neuro and Gen Surg  2. Check labs periodically  3. Recommend that she F/U with GI about the dysphagia and colon wall thickening on CT - she needs a colonoscopy in future  4. Refer to Dr Acosta to evaluate the breast finding  5. RTC in 4 weeks via telemed    Fax note to Chai Adamson (PCP), Bijan Acosta [place], Mishra; New/Bhaskar Shah         Total Time spent on patient:    I spent over 15 mins of time with the patient. Reviewed results of the recently ordered labs, tests, reports and studies; made directives with regards to the results. Over half of this time was spent couseling and coordinating care, making treatment and analytical decisions; ordering necessary labs, tests and studies; and discussing treatment options and setting up  treatment plan(s) if indicated.      Discussion:       COVID-19 Discussion:    I had long discussion with patient and any applicable family about the COVID-19 coronavirus epidemic and the recommended precautions with regard to cancer and/or hematology patients. I have re-iterated the CDC recommendations for adequate hand washing, use of hand -like products, and coughing into elbow, etc. In addition, especially for our patients who are on chemotherapy and/or our otherwise immunocompromised patients, I have recommended avoidance of crowds, including movie theaters, restaurants, churches, etc. I have recommended avoidance of any sick or symptomatic family members and/or friends. I have also recommended avoidance of any raw and unwashed food products, and general avoidance of food items that have not been prepared by themselves. The patient has been asked to call us immediately with any symptom developments, issues, questions or other general concerns.       Telemedicine Statement:    The patient acknowledged and agreed to the audio/video encounter and the patient who is being provided medical services by telemedicine is:  (1) informed of the relationship between the physician and patient and the respective role of any other health care provider with respect to management of the patient; and (2) notified that he or she may decline to receive medical services by telemedicine and may withdraw from such care at any time.    I have explained all of the above in detail and the patient understands all of the current recommendation(s). I have answered all of their questions to the best of my ability and to their complete satisfaction.   The patient is to continue with the current management plan.            Electronically signed by Israel Neely MD      Answers for HPI/ROS submitted by the patient on 4/15/2020   appetite change : No  unexpected weight change: No  visual disturbance: No  cough: No  shortness of  breath: No  chest pain: No  abdominal pain: No  diarrhea: No  frequency: Yes  back pain: No  rash: No  headaches: No  adenopathy: No  nervous/ anxious: No

## 2020-04-16 ENCOUNTER — OFFICE VISIT (OUTPATIENT)
Dept: HEMATOLOGY/ONCOLOGY | Facility: CLINIC | Age: 55
End: 2020-04-16
Payer: MEDICARE

## 2020-04-16 DIAGNOSIS — N64.4 BREAST TENDERNESS IN FEMALE: ICD-10-CM

## 2020-04-16 DIAGNOSIS — Z17.1 MALIGNANT NEOPLASM OF UPPER-INNER QUADRANT OF RIGHT BREAST IN FEMALE, ESTROGEN RECEPTOR NEGATIVE: Primary | ICD-10-CM

## 2020-04-16 DIAGNOSIS — C50.211 MALIGNANT NEOPLASM OF UPPER-INNER QUADRANT OF RIGHT BREAST IN FEMALE, ESTROGEN RECEPTOR NEGATIVE: Primary | ICD-10-CM

## 2020-04-16 DIAGNOSIS — Z87.891 PERSONAL HISTORY OF TOBACCO USE, PRESENTING HAZARDS TO HEALTH: ICD-10-CM

## 2020-04-16 DIAGNOSIS — Z17.1 ESTROGEN RECEPTOR NEGATIVE TUMOR STATUS: ICD-10-CM

## 2020-04-16 PROCEDURE — 99213 PR OFFICE/OUTPT VISIT, EST, LEVL III, 20-29 MIN: ICD-10-PCS | Mod: GT,,, | Performed by: INTERNAL MEDICINE

## 2020-04-16 PROCEDURE — 99213 OFFICE O/P EST LOW 20 MIN: CPT | Mod: GT,,, | Performed by: INTERNAL MEDICINE

## 2020-04-17 ENCOUNTER — PATIENT OUTREACH (OUTPATIENT)
Dept: ADMINISTRATIVE | Facility: OTHER | Age: 55
End: 2020-04-17

## 2020-04-17 ENCOUNTER — OFFICE VISIT (OUTPATIENT)
Dept: FAMILY MEDICINE | Facility: CLINIC | Age: 55
End: 2020-04-17
Payer: MEDICARE

## 2020-04-17 DIAGNOSIS — H92.09 OTALGIA, UNSPECIFIED LATERALITY: ICD-10-CM

## 2020-04-17 DIAGNOSIS — L97.509 ULCER OF FOOT, UNSPECIFIED LATERALITY, UNSPECIFIED ULCER STAGE: Primary | ICD-10-CM

## 2020-04-17 PROCEDURE — 99214 PR OFFICE/OUTPT VISIT, EST, LEVL IV, 30-39 MIN: ICD-10-PCS | Mod: GT,,, | Performed by: FAMILY MEDICINE

## 2020-04-17 PROCEDURE — 99214 OFFICE O/P EST MOD 30 MIN: CPT | Mod: GT,,, | Performed by: FAMILY MEDICINE

## 2020-04-17 RX ORDER — AZITHROMYCIN 250 MG/1
TABLET, FILM COATED ORAL
Qty: 6 TABLET | Refills: 0 | Status: SHIPPED | OUTPATIENT
Start: 2020-04-17 | End: 2022-04-29 | Stop reason: ALTCHOICE

## 2020-04-17 RX ORDER — MUPIROCIN 20 MG/G
OINTMENT TOPICAL 3 TIMES DAILY
Qty: 30 G | Refills: 1 | Status: SHIPPED | OUTPATIENT
Start: 2020-04-17 | End: 2023-01-31 | Stop reason: SDUPTHER

## 2020-04-17 NOTE — PROGRESS NOTES
Patient ID: Clara Dotson is a 54 y.o. female.    Chief Complaint: follow up visit/med check  Ear pain  Last few weeks  R worse than left  No fever   no sinus pressure    Foot ulcer  Stepped on tack  Not zac  Otherwise feels well    Review of Systems   Constitutional: Negative for activity change, appetite change, chills, fatigue and fever.   HENT: Positive for ear pain. Negative for congestion, dental problem, facial swelling, nosebleeds, postnasal drip, sinus pain, sore throat, trouble swallowing and voice change.    Eyes: Negative for pain, discharge and visual disturbance.   Respiratory: Negative for apnea, cough, chest tightness and shortness of breath.    Cardiovascular: Negative for chest pain and palpitations.   Gastrointestinal: Negative for abdominal pain, blood in stool, constipation and nausea.   Endocrine: Negative for cold intolerance, polydipsia and polyuria.   Genitourinary: Negative for difficulty urinating, enuresis and flank pain.   Musculoskeletal: Negative for arthralgias and back pain.   Skin: Negative for color change.   Allergic/Immunologic: Negative for environmental allergies and immunocompromised state.   Neurological: Negative for dizziness and light-headedness.   Hematological: Negative for adenopathy.   Psychiatric/Behavioral: Negative for agitation, behavioral problems, decreased concentration and dysphoric mood. The patient is not nervous/anxious.    All other systems reviewed and are negative.        Reviewed family, medical, surgical, and social history.    Objective:        patient is speaking full sentences  mood and behavior appropriate  no signs of distress  no wheezing heard  No audible congestion in voice  No coughing on the phone  patient doesnt see pus on the tonsils  can move neck in all directions without pain  Reports able to walk normally  able to move all extremities without weakness  reports facial muscles look symmetrical  Patient has no tenderness over the  abdomen with pressure  no CVA tenderness according to patient  Assessment:       1. Ulcer of foot, unspecified laterality, unspecified ulcer stage    2. Otalgia, unspecified laterality        Plan:       Ulcer of foot, unspecified laterality, unspecified ulcer stage  -     mupirocin (BACTROBAN) 2 % ointment; Apply topically 3 (three) times daily.  Dispense: 30 g; Refill: 1    Otalgia, unspecified laterality  -     azithromycin (Z-BOY) 250 MG tablet; Take two tablets on day 1, then 1 tablet on days 2-5.  Dispense: 6 tablet; Refill: 0            Risks, benefits, and side effects were discussed with the patient. All questions were answered to the fullest satisfaction of the patient, and pt verbalized understanding and agreement to treatment plan. Pt was to call with any new or worsening symptoms, or present to the ER.    The patient location is:  Patient Home   Visit type: Virtual visit with synchronous audio and video  Each patient to whom he or she provides medical services by telemedicine is:  (1) informed of the relationship between the physician and patient and the respective role of any other health care provider with respect to management of the patient; and (2) notified that he or she may decline to receive medical services by telemedicine and may withdraw from such care at any time.

## 2020-04-21 ENCOUNTER — HOSPITAL ENCOUNTER (OUTPATIENT)
Dept: RADIOLOGY | Facility: HOSPITAL | Age: 55
Discharge: HOME | End: 2020-04-21
Attending: INTERNAL MEDICINE
Payer: MEDICARE

## 2020-04-21 DIAGNOSIS — N63.0 SUBCUTANEOUS NODULE OF BREAST: ICD-10-CM

## 2020-04-21 PROCEDURE — 25500020 PHARM REV CODE 255: Mod: PO | Performed by: INTERNAL MEDICINE

## 2020-04-21 PROCEDURE — A9585 GADOBUTROL INJECTION: HCPCS | Mod: PO | Performed by: INTERNAL MEDICINE

## 2020-04-21 PROCEDURE — C8937 CAD BREAST MRI: HCPCS | Mod: TC,PO

## 2020-04-21 RX ORDER — GADOBUTROL 604.72 MG/ML
6 INJECTION INTRAVENOUS
Status: COMPLETED | OUTPATIENT
Start: 2020-04-21 | End: 2020-04-21

## 2020-04-21 RX ADMIN — GADOBUTROL 6 ML: 604.72 INJECTION INTRAVENOUS at 11:04

## 2020-04-22 RX ORDER — ONDANSETRON 4 MG/1
TABLET, FILM COATED ORAL
Qty: 12 TABLET | Refills: 1 | Status: SHIPPED | OUTPATIENT
Start: 2020-04-22 | End: 2020-07-22

## 2020-04-23 ENCOUNTER — TELEPHONE (OUTPATIENT)
Dept: HEMATOLOGY/ONCOLOGY | Facility: CLINIC | Age: 55
End: 2020-04-23

## 2020-04-23 NOTE — TELEPHONE ENCOUNTER
----- Message from Israel Neely MD sent at 4/23/2020 11:05 AM CDT -----  Call her with good report

## 2020-04-24 LAB
CREAT SERPL-MCNC: 1 MG/DL (ref 0.5–1.4)
SAMPLE: NORMAL

## 2020-04-28 DIAGNOSIS — K43.9 VENTRAL HERNIA WITHOUT OBSTRUCTION OR GANGRENE: Primary | ICD-10-CM

## 2020-04-29 DIAGNOSIS — E78.5 HYPERLIPIDEMIA, UNSPECIFIED HYPERLIPIDEMIA TYPE: ICD-10-CM

## 2020-04-29 DIAGNOSIS — I10 HYPERTENSION, UNSPECIFIED TYPE: ICD-10-CM

## 2020-04-29 RX ORDER — NITROGLYCERIN 0.4 MG/1
TABLET SUBLINGUAL
Qty: 25 TABLET | Refills: 0 | Status: SHIPPED | OUTPATIENT
Start: 2020-04-29 | End: 2020-05-26

## 2020-04-29 RX ORDER — EZETIMIBE 10 MG/1
TABLET ORAL
Qty: 30 TABLET | Refills: 0 | Status: SHIPPED | OUTPATIENT
Start: 2020-04-29 | End: 2020-05-26

## 2020-04-29 RX ORDER — ISOSORBIDE MONONITRATE 60 MG/1
TABLET, EXTENDED RELEASE ORAL
Qty: 30 TABLET | Refills: 0 | Status: SHIPPED | OUTPATIENT
Start: 2020-04-29 | End: 2020-05-26

## 2020-04-29 RX ORDER — ENALAPRIL MALEATE 5 MG/1
TABLET ORAL
Qty: 30 TABLET | Refills: 0 | Status: SHIPPED | OUTPATIENT
Start: 2020-04-29 | End: 2020-05-26

## 2020-04-30 ENCOUNTER — HOSPITAL ENCOUNTER (OUTPATIENT)
Dept: RADIOLOGY | Facility: HOSPITAL | Age: 55
Discharge: HOME | End: 2020-04-30
Attending: SURGERY
Payer: MEDICARE

## 2020-04-30 DIAGNOSIS — K43.9 VENTRAL HERNIA WITHOUT OBSTRUCTION OR GANGRENE: ICD-10-CM

## 2020-04-30 LAB
CREAT SERPL-MCNC: 0.9 MG/DL (ref 0.5–1.4)
SAMPLE: NORMAL

## 2020-04-30 PROCEDURE — 25500020 PHARM REV CODE 255: Mod: PO | Performed by: SURGERY

## 2020-04-30 PROCEDURE — 74177 CT ABD & PELVIS W/CONTRAST: CPT | Mod: TC,PO

## 2020-04-30 RX ADMIN — IOHEXOL 100 ML: 350 INJECTION, SOLUTION INTRAVENOUS at 10:04

## 2020-05-20 ENCOUNTER — PATIENT MESSAGE (OUTPATIENT)
Dept: ADMINISTRATIVE | Facility: HOSPITAL | Age: 55
End: 2020-05-20

## 2020-05-26 DIAGNOSIS — E78.5 HYPERLIPIDEMIA, UNSPECIFIED HYPERLIPIDEMIA TYPE: ICD-10-CM

## 2020-05-26 DIAGNOSIS — I10 HYPERTENSION, UNSPECIFIED TYPE: ICD-10-CM

## 2020-05-26 RX ORDER — EZETIMIBE 10 MG/1
TABLET ORAL
Qty: 30 TABLET | Refills: 0 | Status: SHIPPED | OUTPATIENT
Start: 2020-05-26 | End: 2020-06-05

## 2020-05-26 RX ORDER — ISOSORBIDE MONONITRATE 60 MG/1
TABLET, EXTENDED RELEASE ORAL
Qty: 30 TABLET | Refills: 0 | Status: SHIPPED | OUTPATIENT
Start: 2020-05-26 | End: 2020-06-05

## 2020-05-26 RX ORDER — ENALAPRIL MALEATE 5 MG/1
TABLET ORAL
Qty: 30 TABLET | Refills: 0 | Status: SHIPPED | OUTPATIENT
Start: 2020-05-26 | End: 2020-06-05

## 2020-05-26 RX ORDER — NITROGLYCERIN 0.4 MG/1
TABLET SUBLINGUAL
Qty: 25 TABLET | Refills: 0 | Status: SHIPPED | OUTPATIENT
Start: 2020-05-26 | End: 2020-06-05

## 2020-06-22 DIAGNOSIS — R13.10 DYSPHAGIA, UNSPECIFIED TYPE: Primary | ICD-10-CM

## 2020-06-28 NOTE — PROGRESS NOTES
[facility] Hematology/Oncology  PROGRESS NOTE - Telemedicine Visit      Subjective:       Patient ID:   NAME: Clara Dotson : 1965     55 y.o. female    Referring Doc: Dave  Other Physicians: Lucila (new [facility]), Juan Diego (PCP-Jaquelin); Kalina (Neuro); Bhaskar    Chief Complaint:  breast ca  f/u    History of Present Illness:     Patient is being seen today in person. She had recent hernia repair with Dr Acosta and has healed up well.     The patient is her by herself. She is doing ok.  She denies any current CP, SOB,HA's or N/V. She has chronic SOB and LYNCH and is now on two different inhalers. Breathing stable.      She noticed a small bean-sized nodule on the right breast about a month ago. We ordered mammogram and US were normal. Dr Acosta plans to continue to watch this.     She has some hearing issues and plans to see Dr Barajas with ENT in [place]    She plans to have EGD and colonoscopy in near future at [facility] in [place]    I discussed COVID19 precautions      ROS:   GEN: normal without any fever, night sweats or weight loss  HEENT: normal with no HA's, sore throat, stiff neck, changes in vision; hearing issues  CV: normal with no CP, SOB, PND, LYNCH or orthopnea  PULM: chronic stable SOB  GI: normal with no abdominal pain, nausea, vomiting, constipation, diarrhea, melanotic stools,  BRBPR, or hematemesis;    : normal with no hematuria, dysuria  BREAST: small nodule on right breast stable  SKIN: normal with no rash, erythema, bruising, or swelling    Allergies:  Review of patient's allergies indicates:   Allergen Reactions    Pcn [penicillins] Hives       Medications:    Current Outpatient Medications:     ascorbic acid, vitamin C, (VITAMIN C) 100 MG tablet, Take 100 mg by mouth once daily., Disp: , Rfl:     aspirin 325 MG tablet, Take 325 mg by mouth once daily., Disp: , Rfl:     azithromycin (Z-BOY) 250 MG tablet, Take two tablets on day 1, then 1 tablet on days 2-5., Disp: 6 tablet, Rfl:  0    chlordiazepoxide-clidinium 5-2.5 mg (LIBRAX) 5-2.5 mg Cap, Take 1 capsule by mouth 3 (three) times daily with meals., Disp: 90 capsule, Rfl: 2    enalapril (VASOTEC) 5 MG tablet, TAKE ONE TABLET BY MOUTH EVERY DAY **THANK YOU**, Disp: 30 tablet, Rfl: 0    ezetimibe (ZETIA) 10 mg tablet, TAKE ONE TABLET BY MOUTH EVERY DAY **THANK YOU**, Disp: 30 tablet, Rfl: 0    gabapentin (NEURONTIN) 300 MG capsule, TAKE ONE CAPSULE BY MOUTH THREE TIMES DAILY **THANK YOU**, Disp: 90 capsule, Rfl: 3    hydrocodone-acetaminophen 10-325mg (NORCO)  mg Tab, every 6 (six) hours as needed. , Disp: , Rfl:     isosorbide mononitrate (IMDUR) 60 MG 24 hr tablet, TAKE ONE TABLET BY MOUTH EVERY DAY **THANK YOU**, Disp: 30 tablet, Rfl: 0    mupirocin (BACTROBAN) 2 % ointment, Apply topically 3 (three) times daily., Disp: 30 g, Rfl: 1    nitroGLYCERIN (NITROSTAT) 0.4 MG SL tablet, DISSOLVE 1 TABLET UNDER THE TONGUE FOR CHEST PAIN MAY REPEAT EVERY FIVE MINUTES FOR NO MORE THAN 3 DOSES **THANK YOU**, Disp: 25 tablet, Rfl: 0    omeprazole (PRILOSEC) 20 MG capsule, Take 1 capsule (20 mg total) by mouth once daily., Disp: 90 capsule, Rfl: 3    ondansetron (ZOFRAN) 4 MG tablet, TAKE ONE TABLET BY MOUTH EVERY 6 HOURS **THANK YOU**, Disp: 12 tablet, Rfl: 1    ondansetron (ZOFRAN) 8 MG tablet, Take 1 tablet (8 mg total) by mouth every 8 (eight) hours as needed for Nausea., Disp: 30 tablet, Rfl: 0    sucralfate (CARAFATE) 1 gram tablet, Take 1 tablet (1 g total) by mouth 4 (four) times daily., Disp: 120 tablet, Rfl: 2    tiotropium (SPIRIVA WITH HANDIHALER) 18 mcg inhalation capsule, Inhale 1 capsule (18 mcg total) into the lungs once daily. Controller, Disp: 30 capsule, Rfl: 5    tiZANidine (ZANAFLEX) 4 MG tablet, Take 4 mg by mouth every 6 (six) hours as needed., Disp: , Rfl:     VENTOLIN HFA 90 mcg/actuation inhaler, inhale TWO puffs into lungs EVERY 6 HOURS AS NEEDED FOR WHEEZING THANK YOU, Disp: , Rfl: 6    vitamin D  (VITAMIN D3) 1000 units Tab, Take 1,000 Units by mouth once daily., Disp: , Rfl:     vitamin E 1000 UNIT capsule, Take 1,000 Units by mouth once daily., Disp: , Rfl:     Current Facility-Administered Medications:     cyanocobalamin injection 100 mcg, 100 mcg, Intramuscular, Q30 Days, Israel Neely MD, 1,000 mcg at 06/02/18 0945    PMHx/PSHx Updates:  See patient's last visit with me on 4/16/2020  See H&P in Vol #1        Pathology:  See Vol #1          Objective:     Vitals:  Blood pressure (!) 149/81, pulse 70, temperature 98.4 °F (36.9 °C), resp. rate 18, weight 61.5 kg (135 lb 9.6 oz).        Physical Examination:   GEN: no apparent distress, comfortable; AAOx3  HEAD: atraumatic and normocephalic  EYES: no pallor, no icterus, PERRLA  ENT: OMM, no pharyngeal erythema, external ears WNL; no nasal discharge; no thrush  NECK: no masses, thyroid normal, trachea midline, no LAD/LN's, supple  CV: RRR with no murmur; normal pulse; normal S1 and S2; no pedal edema  CHEST: Normal respiratory effort; CTAB; normal breath sounds; no wheeze or crackles  ABDOM: nontender and nondistended; soft; normal bowel sounds; no rebound/guarding  MUSC/Skeletal: ROM normal; no crepitus; joints normal; no deformities or arthropathy  EXTREM: no clubbing, cyanosis, inflammation or swelling  SKIN: no rashes, lesions, ulcers, petechiae or subcutaneous nodules; tattoos  : no keen  NEURO: grossly intact; motor/sensory WNL; AAOx3; no tremors  PSYCH: normal mood, affect and behavior  LYMPH: normal cervical, supraclavicular, axillary and groin LN's  Breast: small nodule on right breast; bilateral mastectomies          Labs:   2/3/2020  Lab Results   Component Value Date    WBC 4.92 02/03/2020    HGB 11.6 (L) 02/03/2020    HCT 34.4 (L) 02/03/2020    MCV 88 02/03/2020     02/03/2020          BMP  Lab Results   Component Value Date     02/03/2020    K 4.0 02/03/2020     02/03/2020    CO2 24 02/03/2020    BUN 6 02/03/2020     CREATININE 1.0 02/03/2020    CALCIUM 8.5 (L) 02/03/2020    ANIONGAP 9 02/03/2020    ESTGFRAFRICA >60.0 02/03/2020    EGFRNONAA >60.0 02/03/2020     Lab Results   Component Value Date    ALT 50 (H) 02/03/2020    AST 69 (H) 02/03/2020    ALKPHOS 58 02/03/2020    BILITOT 0.4 02/03/2020         Radiology/Diagnostic Studies:    Mammo and bilat US 4/14/2020:    FINDINGS:  Breast Density: Amost entirely fat.     No suspicious mass, microcalcification, or architectural distortion.     Bilateral breast ultrasound     Sonographic evaluation of the palpable area concern in the 3 o'clock position the right breast shows no discrete solid or cystic mass.     Sonographic evaluation of the 10 o'clock position left breast shows no discrete solid or cystic mass.     Impression:     Bilateral mastectomies with tram reconstruction.  There is no mammographic or sonographic abnormality in either breast.     Recommendation: Annual screening mammography.     BI-RADS CATEGORY: 1  NEGATIVE        CXR 4/14/2020:    Impression       No acute pulmonary process             CT abdom/pelvis  1/31/2020:    Impression       Mild wall thickening in the right and transverse colon and in the distal small bowel, suggesting an enteritis/colitis.  Inflammatory bowel disease is a differential consideration.    No abscess, perforation, obstruction.    No evidence of metastatic malignancy.             PET/CT Fusion 6/4/2018:  IMPRESSION:    1. Negative for recurrent malignancy or metastatic disease.    2. Development of moderate hepatic steatosis.        CXR 5/31/2017 negative    I have reviewed all available lab results and radiology reports.    Assessment/Plan:     (1) 55 y.o. female with diagnosis of right breast cancer  - diagnosed in 2004  - Stage II  - s/p bilateral mastectomies  - followed by Dr Acosta with Gen Surgery  - s/p AC x4 and XRT  - triple negative  - CT scans in 3/2016  - PET on 6/4/2018 - negative for recurrent cancer  - repeat mammogram  and US on 4/14/2020 negative and CXR was normal as well  - will refer her to see Dr Acosta for the area she has a concern for on the breast     (2) hx/of TIA's and PAD  - followed by Dr Whitlock with cardiology and now by Dr Gaytan     (3) COPD and former smoker  - followed by Dr Wolff with pulm in past  - she is on two separate inhalers     (4) Right inferior auricular biopsy in Nov 2013 - atypical single unit junctional melanocytic hyperplasia with no evidence of malignancy  - Dr Acosta following       (5) Arthritic issues    (6) Skin cancer issues - followed by Dr Manley (Derm)    (7) Lower extremity issues - she is seeing Dr Gilman/René with Neurology at [place] and she had a nerve conduction study and she plans to see ortho in future    (8) Chronic pain syndrome - followed by Pain management - Dr Barajas    (9) Chronic dysphagia and hx/of hepatic steatosis - s/p prior colonoscopy with Dr Muro  - she plans to see GI with [facility] in [place] in near future    (10) Recent bout of N/V in Jan 2020 - she had CT scans with some thickening of then colon wall  - she need to have a colonoscopy in near future     (11) Hearing issues - she plans to see Dr Barajas with ENT in near future in [facility]           1. Malignant neoplasm of upper-inner quadrant of right breast in female, estrogen receptor negative     2. Estrogen receptor negative tumor status     3. Personal history of tobacco use, presenting hazards to health             PLAN:  1. F/u with PCP, card, pulm, dermatology, neuro and Gen Surg  2. Check up to date labs  3. Recommend that she F/U with GI about the dysphagia and colon wall thickening on CT - she plans to see GI with [facility] in [place] in the near  future  4. F/u with Dr Acosta for the stable breast finding  5. RTC in 4 months    Fax note to Chai Adamson (PCP), Bijan Acosta [place], Mishra; New/Bhaskar Shah         Total Time spent on patient:    I spent over 15 mins of  time with the patient. Reviewed results of the recently ordered labs, tests, reports and studies; made directives with regards to the results. Over half of this time was spent couseling and coordinating care, making treatment and analytical decisions; ordering necessary labs, tests and studies; and discussing treatment options and setting up treatment plan(s) if indicated.      Discussion:       COVID-19 Discussion:    I had long discussion with patient and any applicable family about the COVID-19 coronavirus epidemic and the recommended precautions with regard to cancer and/or hematology patients. I have re-iterated the CDC recommendations for adequate hand washing, use of hand -like products, and coughing into elbow, etc. In addition, especially for our patients who are on chemotherapy and/or our otherwise immunocompromised patients, I have recommended avoidance of crowds, including movie theaters, restaurants, churches, etc. I have recommended avoidance of any sick or symptomatic family members and/or friends. I have also recommended avoidance of any raw and unwashed food products, and general avoidance of food items that have not been prepared by themselves. The patient has been asked to call us immediately with any symptom developments, issues, questions or other general concerns.          I have explained all of the above in detail and the patient understands all of the current recommendation(s). I have answered all of their questions to the best of my ability and to their complete satisfaction.   The patient is to continue with the current management plan.            Electronically signed by Israel Neely MD

## 2020-06-29 ENCOUNTER — LAB VISIT (OUTPATIENT)
Dept: PRIMARY CARE CLINIC | Facility: CLINIC | Age: 55
End: 2020-06-29
Payer: MEDICARE

## 2020-06-29 ENCOUNTER — OFFICE VISIT (OUTPATIENT)
Dept: HEMATOLOGY/ONCOLOGY | Facility: CLINIC | Age: 55
End: 2020-06-29
Payer: MEDICARE

## 2020-06-29 ENCOUNTER — LAB VISIT (OUTPATIENT)
Dept: LAB | Facility: HOSPITAL | Age: 55
End: 2020-06-29
Attending: INTERNAL MEDICINE
Payer: MEDICARE

## 2020-06-29 VITALS
SYSTOLIC BLOOD PRESSURE: 149 MMHG | HEART RATE: 70 BPM | DIASTOLIC BLOOD PRESSURE: 81 MMHG | BODY MASS INDEX: 23.28 KG/M2 | TEMPERATURE: 98 F | RESPIRATION RATE: 18 BRPM | WEIGHT: 135.63 LBS

## 2020-06-29 DIAGNOSIS — Z17.1 MALIGNANT NEOPLASM OF UPPER-INNER QUADRANT OF RIGHT BREAST IN FEMALE, ESTROGEN RECEPTOR NEGATIVE: Primary | ICD-10-CM

## 2020-06-29 DIAGNOSIS — C50.211 MALIGNANT NEOPLASM OF UPPER-INNER QUADRANT OF RIGHT BREAST IN FEMALE, ESTROGEN RECEPTOR NEGATIVE: ICD-10-CM

## 2020-06-29 DIAGNOSIS — Z17.1 ESTROGEN RECEPTOR NEGATIVE TUMOR STATUS: ICD-10-CM

## 2020-06-29 DIAGNOSIS — C50.211 MALIGNANT NEOPLASM OF UPPER-INNER QUADRANT OF RIGHT BREAST IN FEMALE, ESTROGEN RECEPTOR NEGATIVE: Primary | ICD-10-CM

## 2020-06-29 DIAGNOSIS — Z17.1 MALIGNANT NEOPLASM OF UPPER-INNER QUADRANT OF RIGHT BREAST IN FEMALE, ESTROGEN RECEPTOR NEGATIVE: ICD-10-CM

## 2020-06-29 DIAGNOSIS — R13.10 DYSPHAGIA, UNSPECIFIED TYPE: ICD-10-CM

## 2020-06-29 DIAGNOSIS — Z87.891 PERSONAL HISTORY OF TOBACCO USE, PRESENTING HAZARDS TO HEALTH: ICD-10-CM

## 2020-06-29 LAB
ALBUMIN SERPL BCP-MCNC: 4.2 G/DL (ref 3.5–5.2)
ALP SERPL-CCNC: 86 U/L (ref 55–135)
ALT SERPL W/O P-5'-P-CCNC: 71 U/L (ref 10–44)
ANION GAP SERPL CALC-SCNC: 13 MMOL/L (ref 8–16)
AST SERPL-CCNC: 41 U/L (ref 10–40)
BASOPHILS # BLD AUTO: 0.03 K/UL (ref 0–0.2)
BASOPHILS NFR BLD: 0.8 % (ref 0–1.9)
BILIRUB SERPL-MCNC: 0.8 MG/DL (ref 0.1–1)
BUN SERPL-MCNC: 12 MG/DL (ref 6–20)
CALCIUM SERPL-MCNC: 9.4 MG/DL (ref 8.7–10.5)
CHLORIDE SERPL-SCNC: 101 MMOL/L (ref 95–110)
CO2 SERPL-SCNC: 26 MMOL/L (ref 23–29)
CREAT SERPL-MCNC: 0.9 MG/DL (ref 0.5–1.4)
DIFFERENTIAL METHOD: ABNORMAL
EOSINOPHIL # BLD AUTO: 0.1 K/UL (ref 0–0.5)
EOSINOPHIL NFR BLD: 2 % (ref 0–8)
ERYTHROCYTE [DISTWIDTH] IN BLOOD BY AUTOMATED COUNT: 12.8 % (ref 11.5–14.5)
EST. GFR  (AFRICAN AMERICAN): >60 ML/MIN/1.73 M^2
EST. GFR  (NON AFRICAN AMERICAN): >60 ML/MIN/1.73 M^2
FERRITIN SERPL-MCNC: 160 NG/ML (ref 20–300)
GLUCOSE SERPL-MCNC: 72 MG/DL (ref 70–110)
HCT VFR BLD AUTO: 34 % (ref 37–48.5)
HGB BLD-MCNC: 11.7 G/DL (ref 12–16)
IMM GRANULOCYTES # BLD AUTO: 0.01 K/UL (ref 0–0.04)
IMM GRANULOCYTES NFR BLD AUTO: 0.3 % (ref 0–0.5)
IRON SERPL-MCNC: 97 UG/DL (ref 30–160)
LYMPHOCYTES # BLD AUTO: 1.6 K/UL (ref 1–4.8)
LYMPHOCYTES NFR BLD: 40.8 % (ref 18–48)
MCH RBC QN AUTO: 29.8 PG (ref 27–31)
MCHC RBC AUTO-ENTMCNC: 34.4 G/DL (ref 32–36)
MCV RBC AUTO: 87 FL (ref 82–98)
MONOCYTES # BLD AUTO: 0.4 K/UL (ref 0.3–1)
MONOCYTES NFR BLD: 9.5 % (ref 4–15)
NEUTROPHILS # BLD AUTO: 1.9 K/UL (ref 1.8–7.7)
NEUTROPHILS NFR BLD: 46.6 % (ref 38–73)
NRBC BLD-RTO: 0 /100 WBC
PLATELET # BLD AUTO: 209 K/UL (ref 150–350)
PMV BLD AUTO: 9.5 FL (ref 9.2–12.9)
POTASSIUM SERPL-SCNC: 3.6 MMOL/L (ref 3.5–5.1)
PROT SERPL-MCNC: 6.8 G/DL (ref 6–8.4)
RBC # BLD AUTO: 3.92 M/UL (ref 4–5.4)
SATURATED IRON: 27 % (ref 20–50)
SODIUM SERPL-SCNC: 140 MMOL/L (ref 136–145)
TOTAL IRON BINDING CAPACITY: 365 UG/DL (ref 250–450)
TRANSFERRIN SERPL-MCNC: 261 MG/DL (ref 200–375)
WBC # BLD AUTO: 4 K/UL (ref 3.9–12.7)

## 2020-06-29 PROCEDURE — 99214 PR OFFICE/OUTPT VISIT, EST, LEVL IV, 30-39 MIN: ICD-10-PCS | Mod: S$GLB,,, | Performed by: INTERNAL MEDICINE

## 2020-06-29 PROCEDURE — 83540 ASSAY OF IRON: CPT

## 2020-06-29 PROCEDURE — 99214 OFFICE O/P EST MOD 30 MIN: CPT | Mod: S$GLB,,, | Performed by: INTERNAL MEDICINE

## 2020-06-29 PROCEDURE — U0003 INFECTIOUS AGENT DETECTION BY NUCLEIC ACID (DNA OR RNA); SEVERE ACUTE RESPIRATORY SYNDROME CORONAVIRUS 2 (SARS-COV-2) (CORONAVIRUS DISEASE [COVID-19]), AMPLIFIED PROBE TECHNIQUE, MAKING USE OF HIGH THROUGHPUT TECHNOLOGIES AS DESCRIBED BY CMS-2020-01-R: HCPCS

## 2020-06-29 PROCEDURE — 36415 COLL VENOUS BLD VENIPUNCTURE: CPT

## 2020-06-29 PROCEDURE — 82728 ASSAY OF FERRITIN: CPT

## 2020-06-29 PROCEDURE — 85025 COMPLETE CBC W/AUTO DIFF WBC: CPT

## 2020-06-29 PROCEDURE — 80053 COMPREHEN METABOLIC PANEL: CPT

## 2020-06-30 LAB — SARS-COV-2 RNA RESP QL NAA+PROBE: NOT DETECTED

## 2020-07-06 DIAGNOSIS — M48.02 SPINAL STENOSIS IN CERVICAL REGION: Primary | ICD-10-CM

## 2020-07-06 DIAGNOSIS — M47.27 LUMBOSACRAL RADICULOPATHY DUE TO DEGENERATIVE JOINT DISEASE OF SPINE: ICD-10-CM

## 2020-07-06 DIAGNOSIS — M47.27 LUMBOSACRAL RADICULOPATHY DUE TO DEGENERATIVE JOINT DISEASE OF SPINE: Primary | ICD-10-CM

## 2020-07-06 DIAGNOSIS — M47.23 OTHER SPONDYLOSIS WITH RADICULOPATHY, CERVICOTHORACIC REGION: ICD-10-CM

## 2020-07-15 ENCOUNTER — NURSE TRIAGE (OUTPATIENT)
Dept: ADMINISTRATIVE | Facility: CLINIC | Age: 55
End: 2020-07-15

## 2020-07-15 NOTE — TELEPHONE ENCOUNTER
Attempted to contact pt on behalf of [facility]. No answer. Reason for Disposition   No answer.  First attempt to contact caller.  Follow-up call scheduled within 15 minutes.    Protocols used: NO CONTACT OR DUPLICATE CONTACT CALL-A-AH

## 2020-07-16 ENCOUNTER — HOSPITAL ENCOUNTER (OUTPATIENT)
Dept: RADIOLOGY | Facility: HOSPITAL | Age: 55
Discharge: HOME | End: 2020-07-16
Attending: PAIN MEDICINE
Payer: MEDICARE

## 2020-07-16 DIAGNOSIS — M47.23 OTHER SPONDYLOSIS WITH RADICULOPATHY, CERVICOTHORACIC REGION: ICD-10-CM

## 2020-07-16 DIAGNOSIS — M48.02 SPINAL STENOSIS IN CERVICAL REGION: ICD-10-CM

## 2020-07-16 DIAGNOSIS — M47.27 LUMBOSACRAL RADICULOPATHY DUE TO DEGENERATIVE JOINT DISEASE OF SPINE: ICD-10-CM

## 2020-07-16 PROCEDURE — 72120 X-RAY BEND ONLY L-S SPINE: CPT | Mod: TC,PO

## 2020-07-16 PROCEDURE — 72040 X-RAY EXAM NECK SPINE 2-3 VW: CPT | Mod: TC,PO

## 2020-07-16 PROCEDURE — 72148 MRI LUMBAR SPINE W/O DYE: CPT | Mod: TC,PO

## 2020-07-16 PROCEDURE — 72141 MRI NECK SPINE W/O DYE: CPT | Mod: TC,PO

## 2021-01-19 DIAGNOSIS — I10 HYPERTENSION, UNSPECIFIED TYPE: ICD-10-CM

## 2021-01-19 DIAGNOSIS — K21.9 GASTROESOPHAGEAL REFLUX DISEASE: ICD-10-CM

## 2021-01-19 DIAGNOSIS — E78.5 HYPERLIPIDEMIA, UNSPECIFIED HYPERLIPIDEMIA TYPE: ICD-10-CM

## 2021-01-19 RX ORDER — EZETIMIBE 10 MG/1
TABLET ORAL
Qty: 30 TABLET | Refills: 11 | Status: SHIPPED | OUTPATIENT
Start: 2021-01-19 | End: 2022-01-18

## 2021-01-19 RX ORDER — ISOSORBIDE MONONITRATE 60 MG/1
TABLET, EXTENDED RELEASE ORAL
Qty: 30 TABLET | Refills: 11 | Status: SHIPPED | OUTPATIENT
Start: 2021-01-19 | End: 2022-01-18

## 2021-01-19 RX ORDER — ENALAPRIL MALEATE 5 MG/1
TABLET ORAL
Qty: 30 TABLET | Refills: 11 | Status: SHIPPED | OUTPATIENT
Start: 2021-01-19 | End: 2022-01-18

## 2021-01-19 RX ORDER — OMEPRAZOLE 20 MG/1
CAPSULE, DELAYED RELEASE ORAL
Qty: 90 CAPSULE | Refills: 3 | Status: SHIPPED | OUTPATIENT
Start: 2021-01-19 | End: 2021-03-01 | Stop reason: SDUPTHER

## 2021-01-21 ENCOUNTER — LAB VISIT (OUTPATIENT)
Dept: LAB | Facility: HOSPITAL | Age: 56
End: 2021-01-21
Attending: INTERNAL MEDICINE
Payer: MEDICARE

## 2021-01-21 ENCOUNTER — OFFICE VISIT (OUTPATIENT)
Dept: HEMATOLOGY/ONCOLOGY | Facility: CLINIC | Age: 56
End: 2021-01-21
Payer: MEDICARE

## 2021-01-21 VITALS
BODY MASS INDEX: 23.17 KG/M2 | RESPIRATION RATE: 19 BRPM | SYSTOLIC BLOOD PRESSURE: 130 MMHG | HEART RATE: 71 BPM | TEMPERATURE: 98 F | DIASTOLIC BLOOD PRESSURE: 76 MMHG | WEIGHT: 135 LBS

## 2021-01-21 DIAGNOSIS — R92.8 ABNORMAL MAMMOGRAM: ICD-10-CM

## 2021-01-21 DIAGNOSIS — Z17.1 MALIGNANT NEOPLASM OF UPPER-INNER QUADRANT OF RIGHT BREAST IN FEMALE, ESTROGEN RECEPTOR NEGATIVE: Primary | ICD-10-CM

## 2021-01-21 DIAGNOSIS — C50.211 MALIGNANT NEOPLASM OF UPPER-INNER QUADRANT OF RIGHT BREAST IN FEMALE, ESTROGEN RECEPTOR NEGATIVE: Primary | ICD-10-CM

## 2021-01-21 DIAGNOSIS — C50.211 MALIGNANT NEOPLASM OF UPPER-INNER QUADRANT OF RIGHT BREAST IN FEMALE, ESTROGEN RECEPTOR NEGATIVE: ICD-10-CM

## 2021-01-21 DIAGNOSIS — Z17.1 MALIGNANT NEOPLASM OF UPPER-INNER QUADRANT OF RIGHT BREAST IN FEMALE, ESTROGEN RECEPTOR NEGATIVE: ICD-10-CM

## 2021-01-21 DIAGNOSIS — Z12.31 SCREENING MAMMOGRAM FOR HIGH-RISK PATIENT: ICD-10-CM

## 2021-01-21 DIAGNOSIS — Z87.891 PERSONAL HISTORY OF TOBACCO USE, PRESENTING HAZARDS TO HEALTH: ICD-10-CM

## 2021-01-21 DIAGNOSIS — Z17.1 ESTROGEN RECEPTOR NEGATIVE TUMOR STATUS: ICD-10-CM

## 2021-01-21 LAB
ALBUMIN SERPL BCP-MCNC: 4.1 G/DL (ref 3.5–5.2)
ALP SERPL-CCNC: 104 U/L (ref 55–135)
ALT SERPL W/O P-5'-P-CCNC: 26 U/L (ref 10–44)
ANION GAP SERPL CALC-SCNC: 9 MMOL/L (ref 8–16)
AST SERPL-CCNC: 21 U/L (ref 10–40)
BASOPHILS # BLD AUTO: 0.04 K/UL (ref 0–0.2)
BASOPHILS NFR BLD: 1 % (ref 0–1.9)
BILIRUB SERPL-MCNC: 0.5 MG/DL (ref 0.1–1)
BUN SERPL-MCNC: 14 MG/DL (ref 6–20)
CALCIUM SERPL-MCNC: 9.3 MG/DL (ref 8.7–10.5)
CHLORIDE SERPL-SCNC: 103 MMOL/L (ref 95–110)
CO2 SERPL-SCNC: 28 MMOL/L (ref 23–29)
CREAT SERPL-MCNC: 0.9 MG/DL (ref 0.5–1.4)
DIFFERENTIAL METHOD: NORMAL
EOSINOPHIL # BLD AUTO: 0.1 K/UL (ref 0–0.5)
EOSINOPHIL NFR BLD: 2.3 % (ref 0–8)
ERYTHROCYTE [DISTWIDTH] IN BLOOD BY AUTOMATED COUNT: 12.7 % (ref 11.5–14.5)
EST. GFR  (AFRICAN AMERICAN): >60 ML/MIN/1.73 M^2
EST. GFR  (NON AFRICAN AMERICAN): >60 ML/MIN/1.73 M^2
GLUCOSE SERPL-MCNC: 103 MG/DL (ref 70–110)
HCT VFR BLD AUTO: 37.3 % (ref 37–48.5)
HGB BLD-MCNC: 12.8 G/DL (ref 12–16)
IMM GRANULOCYTES # BLD AUTO: 0.02 K/UL (ref 0–0.04)
IMM GRANULOCYTES NFR BLD AUTO: 0.5 % (ref 0–0.5)
LYMPHOCYTES # BLD AUTO: 1.5 K/UL (ref 1–4.8)
LYMPHOCYTES NFR BLD: 36.3 % (ref 18–48)
MCH RBC QN AUTO: 29.6 PG (ref 27–31)
MCHC RBC AUTO-ENTMCNC: 34.3 G/DL (ref 32–36)
MCV RBC AUTO: 86 FL (ref 82–98)
MONOCYTES # BLD AUTO: 0.4 K/UL (ref 0.3–1)
MONOCYTES NFR BLD: 9.5 % (ref 4–15)
NEUTROPHILS # BLD AUTO: 2 K/UL (ref 1.8–7.7)
NEUTROPHILS NFR BLD: 50.4 % (ref 38–73)
NRBC BLD-RTO: 0 /100 WBC
PLATELET # BLD AUTO: 247 K/UL (ref 150–350)
PMV BLD AUTO: 9.2 FL (ref 9.2–12.9)
POTASSIUM SERPL-SCNC: 4.2 MMOL/L (ref 3.5–5.1)
PROT SERPL-MCNC: 7.3 G/DL (ref 6–8.4)
RBC # BLD AUTO: 4.33 M/UL (ref 4–5.4)
SODIUM SERPL-SCNC: 140 MMOL/L (ref 136–145)
WBC # BLD AUTO: 3.99 K/UL (ref 3.9–12.7)

## 2021-01-21 PROCEDURE — 36415 COLL VENOUS BLD VENIPUNCTURE: CPT

## 2021-01-21 PROCEDURE — 85025 COMPLETE CBC W/AUTO DIFF WBC: CPT

## 2021-01-21 PROCEDURE — 99214 PR OFFICE/OUTPT VISIT, EST, LEVL IV, 30-39 MIN: ICD-10-PCS | Mod: S$GLB,,, | Performed by: INTERNAL MEDICINE

## 2021-01-21 PROCEDURE — 99214 OFFICE O/P EST MOD 30 MIN: CPT | Mod: S$GLB,,, | Performed by: INTERNAL MEDICINE

## 2021-01-21 PROCEDURE — 80053 COMPREHEN METABOLIC PANEL: CPT

## 2021-02-09 NOTE — TELEPHONE ENCOUNTER
----- Message from Israel Neely MD sent at 4/14/2020  2:01 PM CDT -----  Call her with reports - it all looks stable - see if she wants to go see her Gen Surgery about her breasts   Carac Counseling:  I discussed with the patient the risks of Carac including but not limited to erythema, scaling, itching, weeping, crusting, and pain.

## 2021-02-28 ENCOUNTER — PATIENT OUTREACH (OUTPATIENT)
Dept: ADMINISTRATIVE | Facility: OTHER | Age: 56
End: 2021-02-28

## 2021-03-01 ENCOUNTER — DOCUMENTATION ONLY (OUTPATIENT)
Dept: GASTROENTEROLOGY | Facility: CLINIC | Age: 56
End: 2021-03-01

## 2021-03-01 ENCOUNTER — OFFICE VISIT (OUTPATIENT)
Dept: GASTROENTEROLOGY | Facility: CLINIC | Age: 56
End: 2021-03-01
Payer: MEDICARE

## 2021-03-01 VITALS
RESPIRATION RATE: 14 BRPM | OXYGEN SATURATION: 97 % | HEIGHT: 64 IN | DIASTOLIC BLOOD PRESSURE: 73 MMHG | SYSTOLIC BLOOD PRESSURE: 130 MMHG | HEART RATE: 68 BPM | BODY MASS INDEX: 23.05 KG/M2 | WEIGHT: 135 LBS | TEMPERATURE: 99 F

## 2021-03-01 DIAGNOSIS — Z90.49 HISTORY OF CHOLECYSTECTOMY: ICD-10-CM

## 2021-03-01 DIAGNOSIS — R13.10 DYSPHAGIA, UNSPECIFIED TYPE: Primary | ICD-10-CM

## 2021-03-01 DIAGNOSIS — K76.0 FATTY LIVER: ICD-10-CM

## 2021-03-01 DIAGNOSIS — K21.9 GASTROESOPHAGEAL REFLUX DISEASE: ICD-10-CM

## 2021-03-01 DIAGNOSIS — Z86.010 HISTORY OF COLON POLYPS: ICD-10-CM

## 2021-03-01 DIAGNOSIS — K57.90 DIVERTICULOSIS: ICD-10-CM

## 2021-03-01 DIAGNOSIS — K44.9 HIATAL HERNIA: ICD-10-CM

## 2021-03-01 PROBLEM — Z86.0100 HISTORY OF COLON POLYPS: Status: ACTIVE | Noted: 2021-03-01

## 2021-03-01 PROCEDURE — 99999 PR PBB SHADOW E&M-EST. PATIENT-LVL V: ICD-10-PCS | Mod: PBBFAC,,, | Performed by: INTERNAL MEDICINE

## 2021-03-01 PROCEDURE — 99215 OFFICE O/P EST HI 40 MIN: CPT | Mod: PBBFAC,PN | Performed by: INTERNAL MEDICINE

## 2021-03-01 PROCEDURE — 99214 PR OFFICE/OUTPT VISIT, EST, LEVL IV, 30-39 MIN: ICD-10-PCS | Mod: S$PBB,,, | Performed by: INTERNAL MEDICINE

## 2021-03-01 PROCEDURE — 99214 OFFICE O/P EST MOD 30 MIN: CPT | Mod: S$PBB,,, | Performed by: INTERNAL MEDICINE

## 2021-03-01 PROCEDURE — 99999 PR PBB SHADOW E&M-EST. PATIENT-LVL V: CPT | Mod: PBBFAC,,, | Performed by: INTERNAL MEDICINE

## 2021-03-01 RX ORDER — OMEPRAZOLE 20 MG/1
20 CAPSULE, DELAYED RELEASE ORAL 2 TIMES DAILY
Qty: 60 CAPSULE | Refills: 11 | Status: SHIPPED | OUTPATIENT
Start: 2021-03-01 | End: 2022-03-16

## 2021-03-04 DIAGNOSIS — Z11.59 NEED FOR HEPATITIS C SCREENING TEST: ICD-10-CM

## 2021-03-09 DIAGNOSIS — R92.8 ABNORMAL MAMMOGRAM: ICD-10-CM

## 2021-03-09 DIAGNOSIS — C50.211 MALIGNANT NEOPLASM OF UPPER-INNER QUADRANT OF RIGHT FEMALE BREAST: Primary | ICD-10-CM

## 2021-04-07 ENCOUNTER — PATIENT OUTREACH (OUTPATIENT)
Dept: ADMINISTRATIVE | Facility: OTHER | Age: 56
End: 2021-04-07

## 2021-04-09 ENCOUNTER — TELEPHONE (OUTPATIENT)
Dept: GASTROENTEROLOGY | Facility: CLINIC | Age: 56
End: 2021-04-09

## 2021-04-21 ENCOUNTER — HOSPITAL ENCOUNTER (OUTPATIENT)
Dept: RADIOLOGY | Facility: HOSPITAL | Age: 56
Discharge: HOME | End: 2021-04-21
Attending: INTERNAL MEDICINE
Payer: MEDICARE

## 2021-04-21 DIAGNOSIS — Z12.31 SCREENING MAMMOGRAM FOR HIGH-RISK PATIENT: ICD-10-CM

## 2021-04-21 PROCEDURE — 77067 SCR MAMMO BI INCL CAD: CPT | Mod: TC,PO

## 2021-04-22 ENCOUNTER — TELEPHONE (OUTPATIENT)
Dept: HEMATOLOGY/ONCOLOGY | Facility: CLINIC | Age: 56
End: 2021-04-22

## 2021-05-19 ENCOUNTER — TELEPHONE (OUTPATIENT)
Dept: GASTROENTEROLOGY | Facility: CLINIC | Age: 56
End: 2021-05-19

## 2021-06-23 ENCOUNTER — TELEPHONE (OUTPATIENT)
Dept: GASTROENTEROLOGY | Facility: CLINIC | Age: 56
End: 2021-06-23

## 2021-07-01 ENCOUNTER — PATIENT OUTREACH (OUTPATIENT)
Dept: ADMINISTRATIVE | Facility: OTHER | Age: 56
End: 2021-07-01

## 2021-07-02 ENCOUNTER — OFFICE VISIT (OUTPATIENT)
Dept: GASTROENTEROLOGY | Facility: CLINIC | Age: 56
End: 2021-07-02
Payer: MEDICARE

## 2021-07-02 VITALS
OXYGEN SATURATION: 98 % | BODY MASS INDEX: 22.88 KG/M2 | WEIGHT: 134 LBS | HEIGHT: 64 IN | SYSTOLIC BLOOD PRESSURE: 116 MMHG | RESPIRATION RATE: 15 BRPM | HEART RATE: 65 BPM | DIASTOLIC BLOOD PRESSURE: 68 MMHG

## 2021-07-02 DIAGNOSIS — Z86.010 HISTORY OF COLON POLYPS: ICD-10-CM

## 2021-07-02 DIAGNOSIS — K21.9 GASTROESOPHAGEAL REFLUX DISEASE, UNSPECIFIED WHETHER ESOPHAGITIS PRESENT: ICD-10-CM

## 2021-07-02 DIAGNOSIS — R13.10 DYSPHAGIA, UNSPECIFIED TYPE: Primary | ICD-10-CM

## 2021-07-02 DIAGNOSIS — K57.90 DIVERTICULAR DISEASE: ICD-10-CM

## 2021-07-02 PROCEDURE — 1125F AMNT PAIN NOTED PAIN PRSNT: CPT | Mod: S$GLB,,, | Performed by: INTERNAL MEDICINE

## 2021-07-02 PROCEDURE — 99999 PR PBB SHADOW E&M-EST. PATIENT-LVL V: CPT | Mod: PBBFAC,,, | Performed by: INTERNAL MEDICINE

## 2021-07-02 PROCEDURE — 99214 PR OFFICE/OUTPT VISIT, EST, LEVL IV, 30-39 MIN: ICD-10-PCS | Mod: S$GLB,,, | Performed by: INTERNAL MEDICINE

## 2021-07-02 PROCEDURE — 99999 PR PBB SHADOW E&M-EST. PATIENT-LVL V: ICD-10-PCS | Mod: PBBFAC,,, | Performed by: INTERNAL MEDICINE

## 2021-07-02 PROCEDURE — 3008F BODY MASS INDEX DOCD: CPT | Mod: CPTII,S$GLB,, | Performed by: INTERNAL MEDICINE

## 2021-07-02 PROCEDURE — 3008F PR BODY MASS INDEX (BMI) DOCUMENTED: ICD-10-PCS | Mod: CPTII,S$GLB,, | Performed by: INTERNAL MEDICINE

## 2021-07-02 PROCEDURE — 99214 OFFICE O/P EST MOD 30 MIN: CPT | Mod: S$GLB,,, | Performed by: INTERNAL MEDICINE

## 2021-07-02 PROCEDURE — 1125F PR PAIN SEVERITY QUANTIFIED, PAIN PRESENT: ICD-10-PCS | Mod: S$GLB,,, | Performed by: INTERNAL MEDICINE

## 2021-07-27 ENCOUNTER — TELEPHONE (OUTPATIENT)
Dept: GASTROENTEROLOGY | Facility: CLINIC | Age: 56
End: 2021-07-27

## 2021-10-06 ENCOUNTER — OFFICE VISIT (OUTPATIENT)
Dept: HEMATOLOGY/ONCOLOGY | Facility: CLINIC | Age: 56
End: 2021-10-06
Payer: MEDICARE

## 2021-10-06 ENCOUNTER — LAB VISIT (OUTPATIENT)
Dept: LAB | Facility: HOSPITAL | Age: 56
End: 2021-10-06
Attending: INTERNAL MEDICINE
Payer: MEDICARE

## 2021-10-06 VITALS
HEIGHT: 64 IN | HEART RATE: 69 BPM | WEIGHT: 136.19 LBS | DIASTOLIC BLOOD PRESSURE: 79 MMHG | SYSTOLIC BLOOD PRESSURE: 128 MMHG | RESPIRATION RATE: 16 BRPM | BODY MASS INDEX: 23.25 KG/M2

## 2021-10-06 DIAGNOSIS — Z17.1 ESTROGEN RECEPTOR NEGATIVE TUMOR STATUS: ICD-10-CM

## 2021-10-06 DIAGNOSIS — Z17.1 MALIGNANT NEOPLASM OF UPPER-INNER QUADRANT OF RIGHT BREAST IN FEMALE, ESTROGEN RECEPTOR NEGATIVE: Primary | ICD-10-CM

## 2021-10-06 DIAGNOSIS — C50.211 MALIGNANT NEOPLASM OF UPPER-INNER QUADRANT OF RIGHT BREAST IN FEMALE, ESTROGEN RECEPTOR NEGATIVE: ICD-10-CM

## 2021-10-06 DIAGNOSIS — Z17.1 MALIGNANT NEOPLASM OF UPPER-INNER QUADRANT OF RIGHT BREAST IN FEMALE, ESTROGEN RECEPTOR NEGATIVE: ICD-10-CM

## 2021-10-06 DIAGNOSIS — Z87.891 PERSONAL HISTORY OF TOBACCO USE, PRESENTING HAZARDS TO HEALTH: ICD-10-CM

## 2021-10-06 DIAGNOSIS — K76.0 FATTY LIVER: ICD-10-CM

## 2021-10-06 DIAGNOSIS — C50.211 MALIGNANT NEOPLASM OF UPPER-INNER QUADRANT OF RIGHT BREAST IN FEMALE, ESTROGEN RECEPTOR NEGATIVE: Primary | ICD-10-CM

## 2021-10-06 LAB
ALBUMIN SERPL BCP-MCNC: 4.4 G/DL (ref 3.5–5.2)
ALP SERPL-CCNC: 92 U/L (ref 55–135)
ALT SERPL W/O P-5'-P-CCNC: 40 U/L (ref 10–44)
ANION GAP SERPL CALC-SCNC: 9 MMOL/L (ref 8–16)
AST SERPL-CCNC: 31 U/L (ref 10–40)
BASOPHILS # BLD AUTO: 0.04 K/UL (ref 0–0.2)
BASOPHILS NFR BLD: 1.1 % (ref 0–1.9)
BILIRUB SERPL-MCNC: 0.5 MG/DL (ref 0.1–1)
BUN SERPL-MCNC: 12 MG/DL (ref 6–20)
CALCIUM SERPL-MCNC: 9.4 MG/DL (ref 8.7–10.5)
CHLORIDE SERPL-SCNC: 105 MMOL/L (ref 95–110)
CO2 SERPL-SCNC: 27 MMOL/L (ref 23–29)
CREAT SERPL-MCNC: 0.8 MG/DL (ref 0.5–1.4)
DIFFERENTIAL METHOD: ABNORMAL
EOSINOPHIL # BLD AUTO: 0.1 K/UL (ref 0–0.5)
EOSINOPHIL NFR BLD: 2.1 % (ref 0–8)
ERYTHROCYTE [DISTWIDTH] IN BLOOD BY AUTOMATED COUNT: 13.2 % (ref 11.5–14.5)
EST. GFR  (AFRICAN AMERICAN): >60 ML/MIN/1.73 M^2
EST. GFR  (NON AFRICAN AMERICAN): >60 ML/MIN/1.73 M^2
GLUCOSE SERPL-MCNC: 87 MG/DL (ref 70–110)
HCT VFR BLD AUTO: 34.6 % (ref 37–48.5)
HGB BLD-MCNC: 11.6 G/DL (ref 12–16)
IMM GRANULOCYTES # BLD AUTO: 0.01 K/UL (ref 0–0.04)
IMM GRANULOCYTES NFR BLD AUTO: 0.3 % (ref 0–0.5)
LYMPHOCYTES # BLD AUTO: 1.7 K/UL (ref 1–4.8)
LYMPHOCYTES NFR BLD: 45.2 % (ref 18–48)
MCH RBC QN AUTO: 29.2 PG (ref 27–31)
MCHC RBC AUTO-ENTMCNC: 33.5 G/DL (ref 32–36)
MCV RBC AUTO: 87 FL (ref 82–98)
MONOCYTES # BLD AUTO: 0.4 K/UL (ref 0.3–1)
MONOCYTES NFR BLD: 10.6 % (ref 4–15)
NEUTROPHILS # BLD AUTO: 1.5 K/UL (ref 1.8–7.7)
NEUTROPHILS NFR BLD: 40.7 % (ref 38–73)
NRBC BLD-RTO: 0 /100 WBC
PLATELET # BLD AUTO: 245 K/UL (ref 150–450)
PMV BLD AUTO: 9.5 FL (ref 9.2–12.9)
POTASSIUM SERPL-SCNC: 3.6 MMOL/L (ref 3.5–5.1)
PROT SERPL-MCNC: 7.1 G/DL (ref 6–8.4)
RBC # BLD AUTO: 3.97 M/UL (ref 4–5.4)
SODIUM SERPL-SCNC: 141 MMOL/L (ref 136–145)
WBC # BLD AUTO: 3.78 K/UL (ref 3.9–12.7)

## 2021-10-06 PROCEDURE — 36415 COLL VENOUS BLD VENIPUNCTURE: CPT | Performed by: INTERNAL MEDICINE

## 2021-10-06 PROCEDURE — 4010F ACE/ARB THERAPY RXD/TAKEN: CPT | Mod: S$GLB,,, | Performed by: INTERNAL MEDICINE

## 2021-10-06 PROCEDURE — 80053 COMPREHEN METABOLIC PANEL: CPT | Performed by: INTERNAL MEDICINE

## 2021-10-06 PROCEDURE — 1160F PR REVIEW ALL MEDS BY PRESCRIBER/CLIN PHARMACIST DOCUMENTED: ICD-10-PCS | Mod: S$GLB,,, | Performed by: INTERNAL MEDICINE

## 2021-10-06 PROCEDURE — 3074F SYST BP LT 130 MM HG: CPT | Mod: S$GLB,,, | Performed by: INTERNAL MEDICINE

## 2021-10-06 PROCEDURE — 85025 COMPLETE CBC W/AUTO DIFF WBC: CPT | Performed by: INTERNAL MEDICINE

## 2021-10-06 PROCEDURE — 3008F PR BODY MASS INDEX (BMI) DOCUMENTED: ICD-10-PCS | Mod: S$GLB,,, | Performed by: INTERNAL MEDICINE

## 2021-10-06 PROCEDURE — 1160F RVW MEDS BY RX/DR IN RCRD: CPT | Mod: S$GLB,,, | Performed by: INTERNAL MEDICINE

## 2021-10-06 PROCEDURE — 3078F DIAST BP <80 MM HG: CPT | Mod: S$GLB,,, | Performed by: INTERNAL MEDICINE

## 2021-10-06 PROCEDURE — 99214 PR OFFICE/OUTPT VISIT, EST, LEVL IV, 30-39 MIN: ICD-10-PCS | Mod: S$GLB,,, | Performed by: INTERNAL MEDICINE

## 2021-10-06 PROCEDURE — 4010F PR ACE/ARB THEARPY RXD/TAKEN: ICD-10-PCS | Mod: S$GLB,,, | Performed by: INTERNAL MEDICINE

## 2021-10-06 PROCEDURE — 3008F BODY MASS INDEX DOCD: CPT | Mod: S$GLB,,, | Performed by: INTERNAL MEDICINE

## 2021-10-06 PROCEDURE — 3074F PR MOST RECENT SYSTOLIC BLOOD PRESSURE < 130 MM HG: ICD-10-PCS | Mod: S$GLB,,, | Performed by: INTERNAL MEDICINE

## 2021-10-06 PROCEDURE — 99214 OFFICE O/P EST MOD 30 MIN: CPT | Mod: S$GLB,,, | Performed by: INTERNAL MEDICINE

## 2021-10-06 PROCEDURE — 3078F PR MOST RECENT DIASTOLIC BLOOD PRESSURE < 80 MM HG: ICD-10-PCS | Mod: S$GLB,,, | Performed by: INTERNAL MEDICINE

## 2021-10-06 RX ORDER — DOXEPIN HYDROCHLORIDE 10 MG/1
CAPSULE ORAL
COMMUNITY
Start: 2021-09-08

## 2021-11-10 ENCOUNTER — TELEPHONE (OUTPATIENT)
Dept: GASTROENTEROLOGY | Facility: CLINIC | Age: 56
End: 2021-11-10
Payer: MEDICARE

## 2022-01-11 ENCOUNTER — PATIENT MESSAGE (OUTPATIENT)
Dept: ADMINISTRATIVE | Facility: HOSPITAL | Age: 57
End: 2022-01-11
Payer: MEDICARE

## 2022-01-18 DIAGNOSIS — I10 HYPERTENSION, UNSPECIFIED TYPE: ICD-10-CM

## 2022-01-18 DIAGNOSIS — E78.5 HYPERLIPIDEMIA, UNSPECIFIED HYPERLIPIDEMIA TYPE: ICD-10-CM

## 2022-01-18 RX ORDER — ISOSORBIDE MONONITRATE 60 MG/1
TABLET, EXTENDED RELEASE ORAL
Qty: 30 TABLET | Refills: 11 | Status: SHIPPED | OUTPATIENT
Start: 2022-01-18 | End: 2023-02-03

## 2022-01-18 RX ORDER — ENALAPRIL MALEATE 5 MG/1
TABLET ORAL
Qty: 30 TABLET | Refills: 11 | Status: SHIPPED | OUTPATIENT
Start: 2022-01-18 | End: 2023-02-03

## 2022-01-18 RX ORDER — EZETIMIBE 10 MG/1
TABLET ORAL
Qty: 30 TABLET | Refills: 11 | Status: SHIPPED | OUTPATIENT
Start: 2022-01-18 | End: 2023-02-03

## 2022-01-19 DIAGNOSIS — K57.90 DIVERTICULOSIS: ICD-10-CM

## 2022-01-19 DIAGNOSIS — K57.92 DIVERTICULITIS: Primary | ICD-10-CM

## 2022-01-19 RX ORDER — NITROGLYCERIN 0.4 MG/1
TABLET SUBLINGUAL
Qty: 25 TABLET | Refills: 0 | Status: SHIPPED | OUTPATIENT
Start: 2022-01-19 | End: 2023-03-02

## 2022-01-20 ENCOUNTER — TELEPHONE (OUTPATIENT)
Dept: GASTROENTEROLOGY | Facility: CLINIC | Age: 57
End: 2022-01-20
Payer: MEDICARE

## 2022-01-20 DIAGNOSIS — Z86.010 HISTORY OF COLON POLYPS: Primary | ICD-10-CM

## 2022-01-20 NOTE — TELEPHONE ENCOUNTER
----- Message from Judith Vidales MA sent at 1/19/2022  4:00 PM CST -----  Contact: MATTHEW SAMS [[id number]]  Getting this out the inbox.     ----- Message -----  From: Bayron Adamson MD  Sent: 1/19/2022   1:52 PM CST  To: [facility] Staff    Refer her to the surgeon Dr. Martha Calvert with history of colon polyps and diverticulosis and for colonoscopy.  ----- Message -----  From: Filomena Monaco MA  Sent: 1/19/2022   1:47 PM CST  To: Bayron Adamson MD    Pt last seen 7/2021.Would you like PT to ask PCP for order?  ----- Message -----  From: Margo Khanna  Sent: 1/19/2022  11:25 AM CST  To: [facility] Staff    Type: Patient Call Back    Who called:MATTHEW SAMS [[id number]]    What is the request in detail: The patient would like orders for a colonoscopy.     Can the clinic reply by [facility]?    Would the patient rather a call back or a response via [facility]?     Best call back number: [phone] (mobile)    Additional Information:

## 2022-02-16 ENCOUNTER — TELEPHONE (OUTPATIENT)
Dept: GASTROENTEROLOGY | Facility: CLINIC | Age: 57
End: 2022-02-16
Payer: MEDICARE

## 2022-02-16 NOTE — TELEPHONE ENCOUNTER
----- Message from Elvi Savagene sent at 2/16/2022 11:54 AM CST -----  Contact: pt at [phone]  Type: Needs Medical Advice  Who Called:  pt  Best Call Back Number: [phone]  Additional Information: pt is calling the office to get a colonoscopy scheduled. The pt states that she has let a few messages before and has not heard back as of yet. Please call back and advise.

## 2022-02-16 NOTE — TELEPHONE ENCOUNTER
Spoke with patient explained to patient Dr. Verma doesn't do colonoscopies that he placed a referral for General Surgery. Assisted patient and made the appointment with General Surgery. Patient Voiced and understood.

## 2022-03-16 DIAGNOSIS — K21.9 GASTROESOPHAGEAL REFLUX DISEASE: ICD-10-CM

## 2022-03-16 RX ORDER — OMEPRAZOLE 20 MG/1
CAPSULE, DELAYED RELEASE ORAL
Qty: 60 CAPSULE | Refills: 11 | Status: SHIPPED | OUTPATIENT
Start: 2022-03-16 | End: 2022-05-16

## 2022-03-24 DIAGNOSIS — C50.211 MALIGNANT NEOPLASM OF UPPER-INNER QUADRANT OF RIGHT BREAST IN FEMALE, ESTROGEN RECEPTOR NEGATIVE: Primary | ICD-10-CM

## 2022-03-24 DIAGNOSIS — Z17.1 MALIGNANT NEOPLASM OF UPPER-INNER QUADRANT OF RIGHT BREAST IN FEMALE, ESTROGEN RECEPTOR NEGATIVE: Primary | ICD-10-CM

## 2022-03-24 DIAGNOSIS — Z87.891 PERSONAL HISTORY OF TOBACCO USE, PRESENTING HAZARDS TO HEALTH: ICD-10-CM

## 2022-04-05 ENCOUNTER — OFFICE VISIT (OUTPATIENT)
Dept: SURGERY | Facility: CLINIC | Age: 57
End: 2022-04-05
Payer: MEDICARE

## 2022-04-05 VITALS
RESPIRATION RATE: 17 BRPM | OXYGEN SATURATION: 98 % | HEART RATE: 68 BPM | DIASTOLIC BLOOD PRESSURE: 81 MMHG | BODY MASS INDEX: 23.56 KG/M2 | WEIGHT: 138 LBS | SYSTOLIC BLOOD PRESSURE: 125 MMHG | HEIGHT: 64 IN

## 2022-04-05 DIAGNOSIS — Z01.818 PRE-OP TESTING: ICD-10-CM

## 2022-04-05 DIAGNOSIS — K29.60 REFLUX GASTRITIS: ICD-10-CM

## 2022-04-05 DIAGNOSIS — R13.10 DYSPHAGIA, UNSPECIFIED TYPE: ICD-10-CM

## 2022-04-05 DIAGNOSIS — K57.90 DIVERTICULOSIS: ICD-10-CM

## 2022-04-05 DIAGNOSIS — K30 INDIGESTION: ICD-10-CM

## 2022-04-05 DIAGNOSIS — Z86.010 PERSONAL HISTORY OF COLONIC POLYPS: Primary | ICD-10-CM

## 2022-04-05 PROCEDURE — 99214 OFFICE O/P EST MOD 30 MIN: CPT | Mod: S$GLB,,, | Performed by: SURGERY

## 2022-04-05 PROCEDURE — 3008F PR BODY MASS INDEX (BMI) DOCUMENTED: ICD-10-PCS | Mod: CPTII,S$GLB,, | Performed by: SURGERY

## 2022-04-05 PROCEDURE — 99999 PR PBB SHADOW E&M-EST. PATIENT-LVL V: ICD-10-PCS | Mod: PBBFAC,,, | Performed by: SURGERY

## 2022-04-05 PROCEDURE — 99999 PR PBB SHADOW E&M-EST. PATIENT-LVL V: CPT | Mod: PBBFAC,,, | Performed by: SURGERY

## 2022-04-05 PROCEDURE — 1159F PR MEDICATION LIST DOCUMENTED IN MEDICAL RECORD: ICD-10-PCS | Mod: CPTII,S$GLB,, | Performed by: SURGERY

## 2022-04-05 PROCEDURE — 99214 PR OFFICE/OUTPT VISIT, EST, LEVL IV, 30-39 MIN: ICD-10-PCS | Mod: S$GLB,,, | Performed by: SURGERY

## 2022-04-05 PROCEDURE — 3008F BODY MASS INDEX DOCD: CPT | Mod: CPTII,S$GLB,, | Performed by: SURGERY

## 2022-04-05 PROCEDURE — 4010F PR ACE/ARB THEARPY RXD/TAKEN: ICD-10-PCS | Mod: CPTII,S$GLB,, | Performed by: SURGERY

## 2022-04-05 PROCEDURE — 4010F ACE/ARB THERAPY RXD/TAKEN: CPT | Mod: CPTII,S$GLB,, | Performed by: SURGERY

## 2022-04-05 PROCEDURE — 3079F DIAST BP 80-89 MM HG: CPT | Mod: CPTII,S$GLB,, | Performed by: SURGERY

## 2022-04-05 PROCEDURE — 3079F PR MOST RECENT DIASTOLIC BLOOD PRESSURE 80-89 MM HG: ICD-10-PCS | Mod: CPTII,S$GLB,, | Performed by: SURGERY

## 2022-04-05 PROCEDURE — 1159F MED LIST DOCD IN RCRD: CPT | Mod: CPTII,S$GLB,, | Performed by: SURGERY

## 2022-04-05 PROCEDURE — 3074F PR MOST RECENT SYSTOLIC BLOOD PRESSURE < 130 MM HG: ICD-10-PCS | Mod: CPTII,S$GLB,, | Performed by: SURGERY

## 2022-04-05 PROCEDURE — 3074F SYST BP LT 130 MM HG: CPT | Mod: CPTII,S$GLB,, | Performed by: SURGERY

## 2022-04-05 RX ORDER — SODIUM CHLORIDE 9 MG/ML
INJECTION, SOLUTION INTRAVENOUS CONTINUOUS
Status: CANCELLED | OUTPATIENT
Start: 2022-04-05

## 2022-04-05 NOTE — NURSING
Patient, Clara Dotson, was instructed on Magnesium Citrate bowel prep. Patient was given instructions on pre-op, shonda-op, and post-op scheduled appointments.  Patient received blue folder containing all written instructions.      Tracey Castillo LPN

## 2022-04-05 NOTE — H&P
[facility] Surgery H&P Note    Subjective:       Patient ID: Clara Dotson is a 56 y.o. female.    Chief Complaint:  Doc I need a colonoscopy.  Swallowing troubles as well.  HPI:  Clara Dotson is a 56 y.o. female anemia brac positive previous breast cancer status post bilateral mastectomy previous hysterectomy presents today as a new patient referral from Dr. Adamson for evaluation of personal history of colon polyps, dysphagia, gastroesophageal reflux disease, indigestion.  Patient has had a cholecystectomy previously.  Patient's last EGD was 2 years ago.  Gastritis identified.  Patient's last colonoscopy was numerous years ago, numerous colon polyps identified.  She is now passed due for EGD colonoscopy.  No blood in stool.  No unexplained weight loss.  No bowel habit changes.  No known family history of colon or rectal cancers however other cancers have been present.  Patient in regards to her swallowing trouble states the proximal aspect of her esophagus is her trouble, back of her throat.  Reflux indigestion his present.  She desires to proceed with the EGD and colonoscopy.    Past Medical History:   Diagnosis Date    Anemia     Estrogen receptor negative status (ER-) 5/15/2017    Malignant neoplasm of right breast 5/15/2017    Skin cancer     TIA (transient ischemic attack) 2010     Past Surgical History:   Procedure Laterality Date    BILATERAL MASTECTOMY      BREAST RECONSTRUCTION      CHOLECYSTECTOMY      colon polyp removal      ESOPHAGEAL MANOMETRY WITH MEASUREMENT OF IMPEDANCE N/A 7/2/2020    Procedure: MANOMETRY, ESOPHAGUS, WITH IMPEDANCE MEASUREMENT;  Surgeon: Lane Hu MD;  Location: [address]);  Service: Endoscopy;  Laterality: N/A;  C[facility] on 6/29/20 at [facility]    ESOPHAGOGASTRODUODENOSCOPY N/A 1/14/2020    Procedure: EGD (ESOPHAGOGASTRODUODENOSCOPY);  Surgeon: Bayron Adamson MD;  Location: [facility];  Service: Endoscopy;  Laterality: N/A;    HYSTERECTOMY       LIPOSUCTION      MASTECTOMY      melanoma surgery      NECK SURGERY      reconstructive breast       tummy marcial       Family History   Problem Relation Age of Onset    Cancer Mother     Hypertension Mother     Diabetes Mother     Cancer Father     Hypertension Father     Breast cancer Sister         two sisters with breast cancer     Social History     Socioeconomic History    Marital status:    Tobacco Use    Smoking status: Former Smoker     Quit date:      Years since quittin.2    Smokeless tobacco: Never Used   Substance and Sexual Activity    Alcohol use: No    Drug use: No       Current Outpatient Medications   Medication Sig Dispense Refill    ascorbic acid, vitamin C, (VITAMIN C) 100 MG tablet Take 100 mg by mouth once daily.      aspirin 325 MG tablet Take 325 mg by mouth once daily.      doxepin (SINEQUAN) 10 MG capsule Take 1-3 capsule by mouth every night as needed THANK YOU      enalapril (VASOTEC) 5 MG tablet TAKE ONE TABLET BY MOUTH EVERY DAY **THANK YOU** 30 tablet 11    ezetimibe (ZETIA) 10 mg tablet TAKE ONE TABLET BY MOUTH EVERY DAY **THANK YOU** 30 tablet 11    gabapentin (NEURONTIN) 300 MG capsule TAKE ONE CAPSULE BY MOUTH THREE TIMES DAILY **THANK YOU** 90 capsule 3    hydrocodone-acetaminophen 10-325mg (NORCO)  mg Tab every 6 (six) hours as needed.       isosorbide mononitrate (IMDUR) 60 MG 24 hr tablet TAKE ONE TABLET BY MOUTH EVERY DAY **THANK YOU** 30 tablet 11    mupirocin (BACTROBAN) 2 % ointment Apply topically 3 (three) times daily. 30 g 1    nitroGLYCERIN (NITROSTAT) 0.4 MG SL tablet DISSOLVE 1 TABLET UNDER THE TONGUE FOR CHEST PAIN MAY REPEAT EVERY FIVE MINUTES FOR NO MORE THAN 3 DOSES **THANK YOU** 25 tablet 0    omeprazole (PRILOSEC) 20 MG capsule TAKE ONE CAPSULE BY MOUTH TWICE DAILY **THANK YOU** 60 capsule 11    ondansetron (ZOFRAN) 8 MG tablet Take 1 tablet (8 mg total) by mouth every 8 (eight) hours as needed for Nausea. 30  tablet 0    tiotropium (SPIRIVA WITH HANDIHALER) 18 mcg inhalation capsule Inhale 1 capsule (18 mcg total) into the lungs once daily. Controller 30 capsule 5    VENTOLIN HFA 90 mcg/actuation inhaler inhale TWO puffs into lungs EVERY 6 HOURS AS NEEDED FOR WHEEZING THANK YOU  6    vitamin D (VITAMIN D3) 1000 units Tab Take 1,000 Units by mouth once daily.      ZINC ORAL Take by mouth.      azithromycin (Z-BOY) 250 MG tablet Take two tablets on day 1, then 1 tablet on days 2-5. (Patient not taking: No sig reported) 6 tablet 0    chlordiazepoxide-clidinium 5-2.5 mg (LIBRAX) 5-2.5 mg Cap Take 1 capsule by mouth 3 (three) times daily with meals. (Patient not taking: Reported on 4/5/2022) 90 capsule 2    sucralfate (CARAFATE) 1 gram tablet Take 1 tablet (1 g total) by mouth 4 (four) times daily. (Patient not taking: No sig reported) 120 tablet 2    tiZANidine (ZANAFLEX) 4 MG tablet Take 4 mg by mouth every 6 (six) hours as needed.      vitamin E 1000 UNIT capsule Take 1,000 Units by mouth once daily.       Current Facility-Administered Medications   Medication Dose Route Frequency Provider Last Rate Last Admin    cyanocobalamin injection 100 mcg  100 mcg Intramuscular Q30 Days Israel Neely MD   1,000 mcg at 06/02/18 0945     Review of patient's allergies indicates:   Allergen Reactions    Adhesive      Silk tape (white tape)     Pcn [penicillins] Hives    Penicillin        Review of Systems   Constitutional: Negative for activity change, appetite change, chills and fever.   HENT: Negative for congestion, dental problem and ear discharge.    Eyes: Negative for discharge and itching.   Respiratory: Negative for apnea, choking and chest tightness.    Cardiovascular: Negative for chest pain and leg swelling.   Gastrointestinal: Negative for abdominal distention, abdominal pain, anal bleeding, constipation, diarrhea and nausea.   Endocrine: Negative for cold intolerance and heat intolerance.   Genitourinary:  "Negative for difficulty urinating and dyspareunia.   Musculoskeletal: Negative for arthralgias and back pain.   Skin: Negative for color change and pallor.   Neurological: Negative for dizziness and facial asymmetry.   Hematological: Negative for adenopathy. Does not bruise/bleed easily.   Psychiatric/Behavioral: Negative for agitation and behavioral problems.       Objective:      Vitals:    04/05/22 1352   BP: 125/81   Pulse: 68   Resp: 17   SpO2: 98%   Weight: 62.6 kg (138 lb)   Height: 5' 4" (1.626 m)     Physical Exam  Constitutional:       General: She is not in acute distress.     Appearance: She is well-developed.   HENT:      Head: Normocephalic and atraumatic.   Eyes:      Pupils: Pupils are equal, round, and reactive to light.   Neck:      Thyroid: No thyromegaly.   Cardiovascular:      Rate and Rhythm: Normal rate and regular rhythm.   Pulmonary:      Effort: Pulmonary effort is normal.      Breath sounds: Normal breath sounds.   Abdominal:      General: Bowel sounds are normal. There is no distension.      Palpations: Abdomen is soft.      Tenderness: There is no abdominal tenderness.   Musculoskeletal:         General: No deformity. Normal range of motion.      Cervical back: Normal range of motion and neck supple.   Skin:     General: Skin is warm.      Capillary Refill: Capillary refill takes less than 2 seconds.      Findings: No erythema.   Neurological:      Mental Status: She is alert and oriented to person, place, and time.      Cranial Nerves: No cranial nerve deficit.   Psychiatric:         Behavior: Behavior normal.       Previous EGD colonoscopy record reviewed.     Assessment:       1. Personal history of colonic polyps    2. Pre-op testing    3. Diverticulosis    4. Reflux gastritis    5. Indigestion    6. Dysphagia, unspecified type        Plan:   Personal history of colonic polyps  -     Case Request Operating Room: COLONOSCOPY, ESOPHAGOGASTRODUODENOSCOPY (EGD)  -     Full code; " Standing  -     Insert peripheral IV; Standing    Pre-op testing  -     CBC Auto Differential; Future; Expected date: 07/04/2022  -     Comprehensive Metabolic Panel; Future; Expected date: 07/04/2022  -     EKG 12-lead; Future; Expected date: 07/04/2022    Diverticulosis  -     Ambulatory referral/consult to General Surgery  -     Case Request Operating Room: COLONOSCOPY, ESOPHAGOGASTRODUODENOSCOPY (EGD)  -     Full code; Standing  -     Insert peripheral IV; Standing    Reflux gastritis  -     Case Request Operating Room: COLONOSCOPY, ESOPHAGOGASTRODUODENOSCOPY (EGD)  -     Full code; Standing  -     Insert peripheral IV; Standing    Indigestion  -     Case Request Operating Room: COLONOSCOPY, ESOPHAGOGASTRODUODENOSCOPY (EGD)  -     Full code; Standing  -     Insert peripheral IV; Standing    Dysphagia, unspecified type  -     Case Request Operating Room: COLONOSCOPY, ESOPHAGOGASTRODUODENOSCOPY (EGD)  -     Full code; Standing  -     Insert peripheral IV; Standing        Medical Decision Making/Counseling:  Personal history of colon polyps, diverticulosis, dysphagia, gastritis, indigestion, next best steps for this patient's workup will be for high risk screening colonoscopy as well as EGD.  Risk benefits of both options were discussed in detail with patient clinic today.  After risk benefits discussion, patient voiced understanding and desires to proceed the near future.  All questions were answered patient satisfaction.    Will obtain preoperative lab test to include CBC, CMP and EKG for review by the Anesthesiologist the day of the procedure prior to induction of the anesthetic agent of choice.    Risk and benefits of EGD were discussed in clinic in depth.  Risk of EGD were discussed to include bleeding as well as perforation.  Patient understands that if the above procedural risks were to occur, he could need further intervention to include but not exclude a blood transfusion, repeat procedure, admission to  the hospital, or even surgery which would likely require transfer to a higher level of care.     Risks and benefits of Colonoscopy were discussed in depth in clinic as well.  From a procedural standpoint, we discuss the benefits of colonoscopy to be finding colon cancers at early stages, including polyps which can be endoscopically resectable, to finding early stage colon cancers which can be better treated with current medical and surgical therapies in order to give patients a longer survival, if found in these early stages.  From a standpoint of risks, the risk of bleeding and perforation of the colon were discussed.  I personally discussed that if complications of bleeding or perforation were to occur, the patient could need as little as a blood transfusion and as much as possible hospital admission, repeat procedure, or even surgery.  During today's discussion of the procedure of colonoscopy with the patient, I personally selma the patient a picture to assist with counseling.  Total clinic time spent today 45 minutes face-to-face, with greater than half of the time spent in face to face counseling.    Patient instructed that best way to communicate with my office staff is for patient to get on the [facility] patient portal to expedite communication and communication issues that may occur.  Patient was given instructions on how to get on the portal.  I encouraged patient to obtain portal access as well.  Ultimately it is up to the patient to obtain access.  Patient voiced understanding.

## 2022-04-19 ENCOUNTER — TELEPHONE (OUTPATIENT)
Dept: HEMATOLOGY/ONCOLOGY | Facility: CLINIC | Age: 57
End: 2022-04-19

## 2022-04-19 DIAGNOSIS — Z12.31 SCREENING MAMMOGRAM FOR HIGH-RISK PATIENT: Primary | ICD-10-CM

## 2022-04-28 ENCOUNTER — HOSPITAL ENCOUNTER (OUTPATIENT)
Dept: RADIOLOGY | Facility: HOSPITAL | Age: 57
Discharge: HOME | End: 2022-04-28
Attending: INTERNAL MEDICINE
Payer: MEDICARE

## 2022-04-28 DIAGNOSIS — Z12.31 SCREENING MAMMOGRAM FOR HIGH-RISK PATIENT: ICD-10-CM

## 2022-04-28 PROCEDURE — 77067 SCR MAMMO BI INCL CAD: CPT | Mod: TC,PO

## 2022-04-28 PROCEDURE — 77063 BREAST TOMOSYNTHESIS BI: CPT | Mod: TC,PO

## 2022-04-29 ENCOUNTER — HOSPITAL ENCOUNTER (EMERGENCY)
Facility: HOSPITAL | Age: 57
Discharge: HOME | End: 2022-04-29
Attending: FAMILY MEDICINE
Payer: MEDICARE

## 2022-04-29 VITALS
TEMPERATURE: 98 F | RESPIRATION RATE: 17 BRPM | HEIGHT: 64 IN | OXYGEN SATURATION: 98 % | BODY MASS INDEX: 23.05 KG/M2 | SYSTOLIC BLOOD PRESSURE: 132 MMHG | WEIGHT: 135 LBS | HEART RATE: 70 BPM | DIASTOLIC BLOOD PRESSURE: 85 MMHG

## 2022-04-29 DIAGNOSIS — R07.9 CHEST PAIN, UNSPECIFIED TYPE: Primary | ICD-10-CM

## 2022-04-29 DIAGNOSIS — R07.89 CHEST TIGHTNESS: ICD-10-CM

## 2022-04-29 LAB
ALBUMIN SERPL BCP-MCNC: 4.1 G/DL (ref 3.5–5.2)
ALP SERPL-CCNC: 123 U/L (ref 55–135)
ALT SERPL W/O P-5'-P-CCNC: 34 U/L (ref 10–44)
ANION GAP SERPL CALC-SCNC: 11 MMOL/L (ref 8–16)
AST SERPL-CCNC: 23 U/L (ref 10–40)
BASOPHILS # BLD AUTO: 0.03 K/UL (ref 0–0.2)
BASOPHILS NFR BLD: 0.7 % (ref 0–1.9)
BILIRUB SERPL-MCNC: 0.3 MG/DL (ref 0.1–1)
BUN SERPL-MCNC: 12 MG/DL (ref 6–20)
CALCIUM SERPL-MCNC: 9.2 MG/DL (ref 8.7–10.5)
CHLORIDE SERPL-SCNC: 107 MMOL/L (ref 95–110)
CO2 SERPL-SCNC: 24 MMOL/L (ref 23–29)
CREAT SERPL-MCNC: 0.9 MG/DL (ref 0.5–1.4)
D DIMER PPP IA.FEU-MCNC: 0.21 MG/L FEU
DIFFERENTIAL METHOD: NORMAL
EOSINOPHIL # BLD AUTO: 0.1 K/UL (ref 0–0.5)
EOSINOPHIL NFR BLD: 2.4 % (ref 0–8)
ERYTHROCYTE [DISTWIDTH] IN BLOOD BY AUTOMATED COUNT: 12.4 % (ref 11.5–14.5)
EST. GFR  (AFRICAN AMERICAN): >60 ML/MIN/1.73 M^2
EST. GFR  (NON AFRICAN AMERICAN): >60 ML/MIN/1.73 M^2
GLUCOSE SERPL-MCNC: 91 MG/DL (ref 70–110)
HCT VFR BLD AUTO: 37.8 % (ref 37–48.5)
HGB BLD-MCNC: 13.2 G/DL (ref 12–16)
IMM GRANULOCYTES # BLD AUTO: 0.02 K/UL (ref 0–0.04)
IMM GRANULOCYTES NFR BLD AUTO: 0.5 % (ref 0–0.5)
LYMPHOCYTES # BLD AUTO: 1.7 K/UL (ref 1–4.8)
LYMPHOCYTES NFR BLD: 41.5 % (ref 18–48)
MCH RBC QN AUTO: 29.8 PG (ref 27–31)
MCHC RBC AUTO-ENTMCNC: 34.9 G/DL (ref 32–36)
MCV RBC AUTO: 85 FL (ref 82–98)
MONOCYTES # BLD AUTO: 0.4 K/UL (ref 0.3–1)
MONOCYTES NFR BLD: 10.2 % (ref 4–15)
NEUTROPHILS # BLD AUTO: 1.8 K/UL (ref 1.8–7.7)
NEUTROPHILS NFR BLD: 44.7 % (ref 38–73)
NRBC BLD-RTO: 0 /100 WBC
PLATELET # BLD AUTO: 263 K/UL (ref 150–450)
PMV BLD AUTO: 9.6 FL (ref 9.2–12.9)
POTASSIUM SERPL-SCNC: 3.9 MMOL/L (ref 3.5–5.1)
PROT SERPL-MCNC: 7.2 G/DL (ref 6–8.4)
RBC # BLD AUTO: 4.43 M/UL (ref 4–5.4)
SODIUM SERPL-SCNC: 142 MMOL/L (ref 136–145)
TROPONIN I SERPL DL<=0.01 NG/ML-MCNC: <0.006 NG/ML (ref 0–0.03)
WBC # BLD AUTO: 4.1 K/UL (ref 3.9–12.7)

## 2022-04-29 PROCEDURE — 71045 X-RAY EXAM CHEST 1 VIEW: CPT | Mod: 26,,, | Performed by: RADIOLOGY

## 2022-04-29 PROCEDURE — 93005 ELECTROCARDIOGRAM TRACING: CPT

## 2022-04-29 PROCEDURE — 80053 COMPREHEN METABOLIC PANEL: CPT | Performed by: FAMILY MEDICINE

## 2022-04-29 PROCEDURE — 93010 ELECTROCARDIOGRAM REPORT: CPT | Mod: ,,, | Performed by: INTERNAL MEDICINE

## 2022-04-29 PROCEDURE — 85379 FIBRIN DEGRADATION QUANT: CPT | Performed by: FAMILY MEDICINE

## 2022-04-29 PROCEDURE — 85025 COMPLETE CBC W/AUTO DIFF WBC: CPT | Performed by: FAMILY MEDICINE

## 2022-04-29 PROCEDURE — 71045 X-RAY EXAM CHEST 1 VIEW: CPT | Mod: TC,FY

## 2022-04-29 PROCEDURE — 99284 EMERGENCY DEPT VISIT MOD MDM: CPT | Mod: 25

## 2022-04-29 PROCEDURE — 71045 XR CHEST AP PORTABLE: ICD-10-PCS | Mod: 26,,, | Performed by: RADIOLOGY

## 2022-04-29 PROCEDURE — 84484 ASSAY OF TROPONIN QUANT: CPT | Performed by: FAMILY MEDICINE

## 2022-04-29 PROCEDURE — 93010 EKG 12-LEAD: ICD-10-PCS | Mod: ,,, | Performed by: INTERNAL MEDICINE

## 2022-04-29 NOTE — ED TRIAGE NOTES
Patient presents to ED POV with c/o heaviness to her left chest that began upon awakening this morning. She reports that she has blockages in her carotids but has never had a heart attack. Patient reports mild SOB and nausea. Patient is AAOx4. Skin warm, dry to touch. Respirations even, nonlabored. Ambulatory unassisted from wheelchair to stretcher.

## 2022-04-30 NOTE — ED PROVIDER NOTES
Encounter Date: 2022       History     Chief Complaint   Patient presents with    Chest Pain     57-year-old patient complaining of pain to the left pectoralis area pain worsens with movement she denies any shortness of breath cough pain is been present for the past 4-5 hours she has a prior history of anemia and breast cancer no known coronary artery disease she has had a bilateral mastectomy        Review of patient's allergies indicates:   Allergen Reactions    Adhesive      Silk tape (white tape)     Pcn [penicillins] Hives    Penicillin      Past Medical History:   Diagnosis Date    Anemia     Estrogen receptor negative status (ER-) 5/15/2017    Malignant neoplasm of right breast 5/15/2017    Skin cancer     TIA (transient ischemic attack)      Past Surgical History:   Procedure Laterality Date    BILATERAL MASTECTOMY      BREAST RECONSTRUCTION      CHOLECYSTECTOMY      colon polyp removal      ESOPHAGEAL MANOMETRY WITH MEASUREMENT OF IMPEDANCE N/A 2020    Procedure: MANOMETRY, ESOPHAGUS, WITH IMPEDANCE MEASUREMENT;  Surgeon: Lane Hu MD;  Location: [facility] ([address] [place] [address]);  Service: Endoscopy;  Laterality: N/A;  COVID on 20 at [facility]    ESOPHAGOGASTRODUODENOSCOPY N/A 2020    Procedure: EGD (ESOPHAGOGASTRODUODENOSCOPY);  Surgeon: Bayron Adamson MD;  Location: [facility] ENDO;  Service: Endoscopy;  Laterality: N/A;    HYSTERECTOMY      LIPOSUCTION      MASTECTOMY      melanoma surgery      NECK SURGERY      reconstructive breast       tummy tuck       Family History   Problem Relation Age of Onset    Cancer Mother     Hypertension Mother     Diabetes Mother     Breast cancer Father     Cancer Father     Hypertension Father     Breast cancer Sister         two sisters with breast cancer     Social History     Tobacco Use    Smoking status: Former Smoker     Quit date:      Years since quittin.3    Smokeless tobacco: Never Used   Substance Use Topics     Alcohol use: No    Drug use: No     Review of Systems   Constitutional: Negative for fever.   HENT: Negative for sore throat.    Respiratory: Negative for shortness of breath.    Cardiovascular: Negative for chest pain.   Gastrointestinal: Negative for nausea.   Genitourinary: Negative for dysuria.   Musculoskeletal: Negative for back pain.   Skin: Negative for rash.   Neurological: Negative for weakness.   Hematological: Does not bruise/bleed easily.       Physical Exam     Initial Vitals   BP Pulse Resp Temp SpO2   04/29/22 1319 04/29/22 1317 04/29/22 1317 04/29/22 1317 04/29/22 1317   (!) 141/77 74 18 97.6 °F (36.4 °C) 97 %      MAP       --                Physical Exam    Nursing note and vitals reviewed.  Constitutional: She appears well-developed and well-nourished. She is not diaphoretic. No distress.   HENT:   Head: Normocephalic and atraumatic.   Right Ear: External ear normal.   Left Ear: External ear normal.   Eyes: Pupils are equal, round, and reactive to light. Right eye exhibits no discharge. Left eye exhibits no discharge.   Neck: No tracheal deviation present. No JVD present.   Cardiovascular: Exam reveals no friction rub.    No murmur heard.  Pulmonary/Chest: No stridor. No respiratory distress. She has no wheezes. She has no rales.   Abdominal: Bowel sounds are normal. She exhibits no distension.   Musculoskeletal:         General: Normal range of motion.     Neurological: She is alert.   Skin: Skin is warm.   Psychiatric: She has a normal mood and affect.         ED Course   Procedures  Labs Reviewed   CBC W/ AUTO DIFFERENTIAL   COMPREHENSIVE METABOLIC PANEL   D DIMER, QUANTITATIVE   TROPONIN I     EKG Readings: (Independently Interpreted)   Initial Reading: No STEMI. Rhythm: Normal Sinus Rhythm. Heart Rate: 74. Conduction: Normal. ST Segments: Normal ST Segments. T Waves: Normal.       Imaging Results          X-Ray Chest AP Portable (Final result)  Result time 04/29/22 14:05:22    Final result  by Kirill Moran MD (04/29/22 14:05:22)                 Impression:      Acute chest disease.      Electronically signed by: Kirill Moran  Date:    04/29/2022  Time:    14:05             Narrative:    EXAMINATION:  XR CHEST AP PORTABLE    CLINICAL HISTORY:  pain;    TECHNIQUE:  Single frontal view of the chest was performed.    COMPARISON:  04/14/2020.    FINDINGS:  Patient rotated to the RPO position.  Mild chronic interstitial change.  No focal consolidation.    Heart size top normal.  Mediastinal contours unremarkable.  Trachea slightly rotated to the right.    Bony thorax intact.                                 Medications - No data to display                       Clinical Impression:   Final diagnoses:  [R07.89] Chest tightness  [R07.9] Chest pain, unspecified type (Primary)          ED Disposition Condition    Discharge Stable        ED Prescriptions     None        Follow-up Information    None          Jluis Johnson MD  04/30/22 [id number]

## 2022-05-13 ENCOUNTER — NURSE TRIAGE (OUTPATIENT)
Dept: ADMINISTRATIVE | Facility: CLINIC | Age: 57
End: 2022-05-13
Payer: MEDICARE

## 2022-05-13 ENCOUNTER — HOSPITAL ENCOUNTER (OUTPATIENT)
Dept: CARDIOLOGY | Facility: HOSPITAL | Age: 57
Discharge: HOME | End: 2022-05-13
Attending: SURGERY
Payer: MEDICARE

## 2022-05-13 DIAGNOSIS — Z01.818 PRE-OP TESTING: ICD-10-CM

## 2022-05-13 PROCEDURE — 93010 ELECTROCARDIOGRAM REPORT: CPT | Mod: ,,, | Performed by: INTERNAL MEDICINE

## 2022-05-13 PROCEDURE — 93010 EKG 12-LEAD: ICD-10-PCS | Mod: ,,, | Performed by: INTERNAL MEDICINE

## 2022-05-13 PROCEDURE — 93005 ELECTROCARDIOGRAM TRACING: CPT

## 2022-05-16 ENCOUNTER — ANESTHESIA (OUTPATIENT)
Dept: SURGERY | Facility: HOSPITAL | Age: 57
End: 2022-05-16
Payer: MEDICARE

## 2022-05-16 ENCOUNTER — ANESTHESIA EVENT (OUTPATIENT)
Dept: SURGERY | Facility: HOSPITAL | Age: 57
End: 2022-05-16
Payer: MEDICARE

## 2022-05-16 ENCOUNTER — HOSPITAL ENCOUNTER (OUTPATIENT)
Facility: HOSPITAL | Age: 57
Discharge: HOME | End: 2022-05-16
Attending: SURGERY | Admitting: SURGERY
Payer: MEDICARE

## 2022-05-16 VITALS
SYSTOLIC BLOOD PRESSURE: 127 MMHG | RESPIRATION RATE: 14 BRPM | TEMPERATURE: 98 F | HEART RATE: 56 BPM | WEIGHT: 138 LBS | OXYGEN SATURATION: 99 % | DIASTOLIC BLOOD PRESSURE: 81 MMHG | HEIGHT: 64 IN | BODY MASS INDEX: 23.56 KG/M2

## 2022-05-16 DIAGNOSIS — K29.60 REFLUX GASTRITIS: ICD-10-CM

## 2022-05-16 DIAGNOSIS — K30 INDIGESTION: ICD-10-CM

## 2022-05-16 DIAGNOSIS — R13.10 DYSPHAGIA, UNSPECIFIED TYPE: ICD-10-CM

## 2022-05-16 DIAGNOSIS — Z86.010 PERSONAL HISTORY OF COLONIC POLYPS: ICD-10-CM

## 2022-05-16 DIAGNOSIS — K57.90 DIVERTICULOSIS: ICD-10-CM

## 2022-05-16 PROCEDURE — 43239 EGD BIOPSY SINGLE/MULTIPLE: CPT | Mod: 59 | Performed by: SURGERY

## 2022-05-16 PROCEDURE — 43239 PR EGD, FLEX, W/BIOPSY, SGL/MULTI: ICD-10-PCS | Mod: 59,,, | Performed by: SURGERY

## 2022-05-16 PROCEDURE — 45384 PR COLONOSCOPY,REMV LESN,FORCEP/CAUTERY: ICD-10-PCS | Mod: PT,59,, | Performed by: SURGERY

## 2022-05-16 PROCEDURE — 43239 EGD BIOPSY SINGLE/MULTIPLE: CPT | Mod: 59,,, | Performed by: SURGERY

## 2022-05-16 PROCEDURE — 63600175 PHARM REV CODE 636 W HCPCS: Performed by: ANESTHESIOLOGY

## 2022-05-16 PROCEDURE — 88305 TISSUE EXAM BY PATHOLOGIST: CPT | Mod: 59 | Performed by: STUDENT IN AN ORGANIZED HEALTH CARE EDUCATION/TRAINING PROGRAM

## 2022-05-16 PROCEDURE — D9220A PRA ANESTHESIA: Mod: CRNA,,, | Performed by: NURSE ANESTHETIST, CERTIFIED REGISTERED

## 2022-05-16 PROCEDURE — 88342 IMHCHEM/IMCYTCHM 1ST ANTB: CPT | Mod: 26,,, | Performed by: STUDENT IN AN ORGANIZED HEALTH CARE EDUCATION/TRAINING PROGRAM

## 2022-05-16 PROCEDURE — 45384 COLONOSCOPY W/LESION REMOVAL: CPT | Mod: PT,59,, | Performed by: SURGERY

## 2022-05-16 PROCEDURE — 88305 TISSUE EXAM BY PATHOLOGIST: ICD-10-PCS | Mod: 26,,, | Performed by: STUDENT IN AN ORGANIZED HEALTH CARE EDUCATION/TRAINING PROGRAM

## 2022-05-16 PROCEDURE — 88342 CHG IMMUNOCYTOCHEMISTRY: ICD-10-PCS | Mod: 26,,, | Performed by: STUDENT IN AN ORGANIZED HEALTH CARE EDUCATION/TRAINING PROGRAM

## 2022-05-16 PROCEDURE — 45385 COLONOSCOPY W/LESION REMOVAL: CPT | Mod: PT | Performed by: SURGERY

## 2022-05-16 PROCEDURE — 43450 DILATE ESOPHAGUS 1/MULT PASS: CPT | Mod: 51,,, | Performed by: SURGERY

## 2022-05-16 PROCEDURE — 37000009 HC ANESTHESIA EA ADD 15 MINS: Performed by: SURGERY

## 2022-05-16 PROCEDURE — 43250 EGD CAUTERY TUMOR POLYP: CPT | Performed by: SURGERY

## 2022-05-16 PROCEDURE — 37000008 HC ANESTHESIA 1ST 15 MINUTES: Performed by: SURGERY

## 2022-05-16 PROCEDURE — D9220A PRA ANESTHESIA: ICD-10-PCS | Mod: CRNA,,, | Performed by: NURSE ANESTHETIST, CERTIFIED REGISTERED

## 2022-05-16 PROCEDURE — 88342 IMHCHEM/IMCYTCHM 1ST ANTB: CPT | Performed by: STUDENT IN AN ORGANIZED HEALTH CARE EDUCATION/TRAINING PROGRAM

## 2022-05-16 PROCEDURE — 43250 PR EGD, FLEX, W/REMOVAL, TUMOR/POLYP/LESION(S), HOT BIOPSY FORCEPS: ICD-10-PCS | Mod: 51,,, | Performed by: SURGERY

## 2022-05-16 PROCEDURE — 45384 COLONOSCOPY W/LESION REMOVAL: CPT | Mod: PT,59 | Performed by: SURGERY

## 2022-05-16 PROCEDURE — 43450 PR DILATE ESOPHAGUS: ICD-10-PCS | Mod: 51,,, | Performed by: SURGERY

## 2022-05-16 PROCEDURE — 43250 EGD CAUTERY TUMOR POLYP: CPT | Mod: 51,,, | Performed by: SURGERY

## 2022-05-16 PROCEDURE — 25000003 PHARM REV CODE 250: Performed by: NURSE ANESTHETIST, CERTIFIED REGISTERED

## 2022-05-16 PROCEDURE — D9220A PRA ANESTHESIA: Mod: ANES,,, | Performed by: ANESTHESIOLOGY

## 2022-05-16 PROCEDURE — 43450 DILATE ESOPHAGUS 1/MULT PASS: CPT | Performed by: SURGERY

## 2022-05-16 PROCEDURE — 63600175 PHARM REV CODE 636 W HCPCS: Performed by: NURSE ANESTHETIST, CERTIFIED REGISTERED

## 2022-05-16 PROCEDURE — 27201423 OPTIME MED/SURG SUP & DEVICES STERILE SUPPLY: Performed by: SURGERY

## 2022-05-16 PROCEDURE — 45385 COLONOSCOPY W/LESION REMOVAL: CPT | Mod: PT,,, | Performed by: SURGERY

## 2022-05-16 PROCEDURE — G0378 HOSPITAL OBSERVATION PER HR: HCPCS

## 2022-05-16 PROCEDURE — D9220A PRA ANESTHESIA: ICD-10-PCS | Mod: ANES,,, | Performed by: ANESTHESIOLOGY

## 2022-05-16 PROCEDURE — 45385 PR COLONOSCOPY,REMV LESN,SNARE: ICD-10-PCS | Mod: PT,,, | Performed by: SURGERY

## 2022-05-16 PROCEDURE — G0379 DIRECT REFER HOSPITAL OBSERV: HCPCS

## 2022-05-16 PROCEDURE — 88305 TISSUE EXAM BY PATHOLOGIST: CPT | Mod: 26,,, | Performed by: STUDENT IN AN ORGANIZED HEALTH CARE EDUCATION/TRAINING PROGRAM

## 2022-05-16 RX ORDER — PROPOFOL 10 MG/ML
VIAL (ML) INTRAVENOUS
Status: DISCONTINUED | OUTPATIENT
Start: 2022-05-16 | End: 2022-05-16

## 2022-05-16 RX ORDER — LIDOCAINE HYDROCHLORIDE 10 MG/ML
1 INJECTION, SOLUTION EPIDURAL; INFILTRATION; INTRACAUDAL; PERINEURAL ONCE
Status: DISCONTINUED | OUTPATIENT
Start: 2022-05-16 | End: 2022-05-16 | Stop reason: HOSPADM

## 2022-05-16 RX ORDER — SODIUM CHLORIDE, SODIUM LACTATE, POTASSIUM CHLORIDE, CALCIUM CHLORIDE 600; 310; 30; 20 MG/100ML; MG/100ML; MG/100ML; MG/100ML
INJECTION, SOLUTION INTRAVENOUS CONTINUOUS
Status: DISCONTINUED | OUTPATIENT
Start: 2022-05-16 | End: 2022-05-16 | Stop reason: HOSPADM

## 2022-05-16 RX ORDER — PANTOPRAZOLE SODIUM 40 MG/1
40 TABLET, DELAYED RELEASE ORAL DAILY
Qty: 30 TABLET | Refills: 11 | Status: SHIPPED | OUTPATIENT
Start: 2022-05-16 | End: 2023-07-27 | Stop reason: SDUPTHER

## 2022-05-16 RX ORDER — SODIUM CHLORIDE, SODIUM LACTATE, POTASSIUM CHLORIDE, CALCIUM CHLORIDE 600; 310; 30; 20 MG/100ML; MG/100ML; MG/100ML; MG/100ML
125 INJECTION, SOLUTION INTRAVENOUS CONTINUOUS
Status: DISCONTINUED | OUTPATIENT
Start: 2022-05-16 | End: 2022-05-16 | Stop reason: HOSPADM

## 2022-05-16 RX ORDER — LIDOCAINE HYDROCHLORIDE 20 MG/ML
INJECTION, SOLUTION EPIDURAL; INFILTRATION; INTRACAUDAL; PERINEURAL
Status: DISCONTINUED | OUTPATIENT
Start: 2022-05-16 | End: 2022-05-16

## 2022-05-16 RX ORDER — ONDANSETRON 2 MG/ML
4 INJECTION INTRAMUSCULAR; INTRAVENOUS DAILY PRN
Status: DISCONTINUED | OUTPATIENT
Start: 2022-05-16 | End: 2022-05-16 | Stop reason: HOSPADM

## 2022-05-16 RX ORDER — SODIUM CHLORIDE 9 MG/ML
INJECTION, SOLUTION INTRAVENOUS CONTINUOUS
Status: DISCONTINUED | OUTPATIENT
Start: 2022-05-16 | End: 2022-05-16 | Stop reason: HOSPADM

## 2022-05-16 RX ADMIN — PROPOFOL 50 MG: 10 INJECTION, EMULSION INTRAVENOUS at 09:05

## 2022-05-16 RX ADMIN — PROPOFOL 50 MG: 10 INJECTION, EMULSION INTRAVENOUS at 08:05

## 2022-05-16 RX ADMIN — PROPOFOL 100 MG: 10 INJECTION, EMULSION INTRAVENOUS at 08:05

## 2022-05-16 RX ADMIN — SODIUM CHLORIDE, POTASSIUM CHLORIDE, SODIUM LACTATE AND CALCIUM CHLORIDE: 600; 310; 30; 20 INJECTION, SOLUTION INTRAVENOUS at 08:05

## 2022-05-16 RX ADMIN — LIDOCAINE HYDROCHLORIDE 100 MG: 20 INJECTION, SOLUTION EPIDURAL; INFILTRATION; INTRACAUDAL; PERINEURAL at 08:05

## 2022-05-16 NOTE — DISCHARGE SUMMARY
Discharge Note        SUMMARY     Admit Date: 5/16/2022    Attending Physician: Orestes Alvarado MD     Discharge Physician: Orestes Alvarado MD    Discharge Date: 5/16/2022 9:28 AM      Hospital Course: Patient tolerated procedure well.     Disposition: Home or Self Care    Patient Instructions:   Current Discharge Medication List      START taking these medications    Details   pantoprazole (PROTONIX) 40 MG tablet Take 1 tablet (40 mg total) by mouth once daily. Take in the morning before breakfast.  Wait 30 minutes before eating or drinking anything  Qty: 30 tablet, Refills: 11         CONTINUE these medications which have NOT CHANGED    Details   ascorbic acid, vitamin C, (VITAMIN C) 100 MG tablet Take 100 mg by mouth once daily.      doxepin (SINEQUAN) 10 MG capsule Take 1-3 capsule by mouth every night as needed THANK YOU      enalapril (VASOTEC) 5 MG tablet TAKE ONE TABLET BY MOUTH EVERY DAY **THANK YOU**  Qty: 30 tablet, Refills: 11    Associated Diagnoses: Hypertension, unspecified type; Hyperlipidemia, unspecified hyperlipidemia type      ezetimibe (ZETIA) 10 mg tablet TAKE ONE TABLET BY MOUTH EVERY DAY **THANK YOU**  Qty: 30 tablet, Refills: 11    Associated Diagnoses: Hypertension, unspecified type; Hyperlipidemia, unspecified hyperlipidemia type      gabapentin (NEURONTIN) 300 MG capsule TAKE ONE CAPSULE BY MOUTH THREE TIMES DAILY **THANK YOU**  Qty: 90 capsule, Refills: 3      isosorbide mononitrate (IMDUR) 60 MG 24 hr tablet TAKE ONE TABLET BY MOUTH EVERY DAY **THANK YOU**  Qty: 30 tablet, Refills: 11    Associated Diagnoses: Hypertension, unspecified type; Hyperlipidemia, unspecified hyperlipidemia type      vitamin D (VITAMIN D3) 1000 units Tab Take 1,000 Units by mouth once daily.      ZINC ORAL Take by mouth.      aspirin 325 MG tablet Take 325 mg by mouth once daily.      hydrocodone-acetaminophen 10-325mg (NORCO)  mg Tab every 6 (six) hours as needed.       mupirocin  (BACTROBAN) 2 % ointment Apply topically 3 (three) times daily.  Qty: 30 g, Refills: 1    Associated Diagnoses: Ulcer of foot, unspecified laterality, unspecified ulcer stage      nitroGLYCERIN (NITROSTAT) 0.4 MG SL tablet DISSOLVE 1 TABLET UNDER THE TONGUE FOR CHEST PAIN MAY REPEAT EVERY FIVE MINUTES FOR NO MORE THAN 3 DOSES **THANK YOU**  Qty: 25 tablet, Refills: 0      ondansetron (ZOFRAN) 8 MG tablet Take 1 tablet (8 mg total) by mouth every 8 (eight) hours as needed for Nausea.  Qty: 30 tablet, Refills: 0    Associated Diagnoses: Generalized abdominal pain; Intractable vomiting with nausea, unspecified vomiting type      tiotropium (SPIRIVA WITH HANDIHALER) 18 mcg inhalation capsule Inhale 1 capsule (18 mcg total) into the lungs once daily. Controller  Qty: 30 capsule, Refills: 5      VENTOLIN HFA 90 mcg/actuation inhaler inhale TWO puffs into lungs EVERY 6 HOURS AS NEEDED FOR WHEEZING THANK YOU  Refills: 6         STOP taking these medications       omeprazole (PRILOSEC) 20 MG capsule Comments:   Reason for Stopping:               Discharge Procedure Orders (must include Diet, Follow-up, Activity):   Discharge Procedure Orders (must include Diet, Follow-up, Activity)   Diet general     Call MD for:  temperature >100.4     Call MD for:  persistent nausea and vomiting     Call MD for:  severe uncontrolled pain     Call MD for:  difficulty breathing, headache or visual disturbances     Call MD for:  redness, tenderness, or signs of infection (pain, swelling, redness, odor or green/yellow discharge around incision site)     Call MD for:  persistent dizziness or light-headedness        Follow Up:  Follow up as scheduled.  Resume routine diet.  Activity as tolerated.    No driving day of procedure.

## 2022-05-16 NOTE — TRANSFER OF CARE
"Anesthesia Transfer of Care Note    Patient: Clara Dotson    Procedure(s) Performed: Procedure(s) (LRB):  COLONOSCOPY (N/A)  ESOPHAGOGASTRODUODENOSCOPY (EGD) (N/A)    Patient location: PACU    Anesthesia Type: general    Transport from OR: Transported from OR on room air with adequate spontaneous ventilation    Post pain: adequate analgesia    Post assessment: no apparent anesthetic complications and tolerated procedure well    Post vital signs: stable    Level of consciousness: awake, alert and oriented    Nausea/Vomiting: no nausea/vomiting    Complications: none    Transfer of care protocol was followed      Last vitals:   Visit Vitals  /74 (BP Location: Right arm, Patient Position: Lying)   Pulse 66   Temp 36.6 °C (97.9 °F) (Oral)   Resp 18   Ht 5' 4" (1.626 m)   Wt 62.6 kg (138 lb)   SpO2 98%   Breastfeeding No   BMI 23.69 kg/m²     "

## 2022-05-16 NOTE — H&P
[facility] Surgery H&P Note    Subjective:       Patient ID: Clara Dotson is a 57 y.o. female.    Chief Complaint:  Doc I need a colonoscopy.  Swallowing troubles as well.  HPI:  Clara Dotson is a 57 y.o. female anemia brac positive previous breast cancer status post bilateral mastectomy previous hysterectomy presents today as a new patient referral from Dr. Adamson for evaluation of personal history of colon polyps, dysphagia, gastroesophageal reflux disease, indigestion.  Patient has had a cholecystectomy previously.  Patient's last EGD was 2 years ago.  Gastritis identified.  Patient's last colonoscopy was numerous years ago, numerous colon polyps identified.  She is now passed due for EGD colonoscopy.  No blood in stool.  No unexplained weight loss.  No bowel habit changes.  No known family history of colon or rectal cancers however other cancers have been present.  Patient in regards to her swallowing trouble states the proximal aspect of her esophagus is her trouble, back of her throat.  Reflux indigestion his present.  She desires to proceed with the EGD and colonoscopy.    Past Medical History:   Diagnosis Date    Anemia     Estrogen receptor negative status (ER-) 5/15/2017    Malignant neoplasm of right breast 5/15/2017    Skin cancer     TIA (transient ischemic attack) 2010     Past Surgical History:   Procedure Laterality Date    BILATERAL MASTECTOMY      BREAST RECONSTRUCTION      CHOLECYSTECTOMY      colon polyp removal      ESOPHAGEAL MANOMETRY WITH MEASUREMENT OF IMPEDANCE N/A 7/2/2020    Procedure: MANOMETRY, ESOPHAGUS, WITH IMPEDANCE MEASUREMENT;  Surgeon: Lane Hu MD;  Location: [address]);  Service: Endoscopy;  Laterality: N/A;  [facility] 6/29/20 [facility]    ESOPHAGOGASTRODUODENOSCOPY N/A 1/14/2020    Procedure: EGD (ESOPHAGOGASTRODUODENOSCOPY);  Surgeon: Bayron Adamson MD;  Location: [facility];  Service: Endoscopy;  Laterality: N/A;    HYSTERECTOMY       LIPOSUCTION      MASTECTOMY      melanoma surgery      NECK SURGERY      reconstructive breast       tummy marcial       Family History   Problem Relation Age of Onset    Cancer Mother     Hypertension Mother     Diabetes Mother     Breast cancer Father     Cancer Father     Hypertension Father     Breast cancer Sister         two sisters with breast cancer     Social History     Socioeconomic History    Marital status:    Tobacco Use    Smoking status: Former Smoker     Quit date:      Years since quittin.3    Smokeless tobacco: Never Used   Substance and Sexual Activity    Alcohol use: No    Drug use: No       Current Facility-Administered Medications   Medication Dose Route Frequency Provider Last Rate Last Admin    0.9%  NaCl infusion   Intravenous Continuous Orestes Alvarado MD        lactated ringers infusion   Intravenous Continuous Alejandro Rosenberg MD        LIDOcaine (PF) 10 mg/ml (1%) injection 10 mg  1 mL Intradermal Once Alejandro Rosenberg MD         Review of patient's allergies indicates:   Allergen Reactions    Adhesive      Silk tape (white tape)     Pcn [penicillins] Hives    Penicillin        Review of Systems   Constitutional: Negative for activity change, appetite change, chills and fever.   HENT: Negative for congestion, dental problem and ear discharge.    Eyes: Negative for discharge and itching.   Respiratory: Negative for apnea, choking and chest tightness.    Cardiovascular: Negative for chest pain and leg swelling.   Gastrointestinal: Negative for abdominal distention, abdominal pain, anal bleeding, constipation, diarrhea and nausea.   Endocrine: Negative for cold intolerance and heat intolerance.   Genitourinary: Negative for difficulty urinating and dyspareunia.   Musculoskeletal: Negative for arthralgias and back pain.   Skin: Negative for color change and pallor.   Neurological: Negative for dizziness and facial asymmetry.   Hematological: Negative  "for adenopathy. Does not bruise/bleed easily.   Psychiatric/Behavioral: Negative for agitation and behavioral problems.       Objective:      Vitals:    05/16/22 0756   BP: 129/74   BP Location: Right arm   Patient Position: Lying   Pulse: 66   Resp: 18   Temp: 97.9 °F (36.6 °C)   TempSrc: Oral   SpO2: 98%   Weight: 62.6 kg (138 lb)   Height: 5' 4" (1.626 m)     Physical Exam  Constitutional:       General: She is not in acute distress.     Appearance: She is well-developed.   HENT:      Head: Normocephalic and atraumatic.   Eyes:      Pupils: Pupils are equal, round, and reactive to light.   Neck:      Thyroid: No thyromegaly.   Cardiovascular:      Rate and Rhythm: Normal rate and regular rhythm.   Pulmonary:      Effort: Pulmonary effort is normal.      Breath sounds: Normal breath sounds.   Abdominal:      General: Bowel sounds are normal. There is no distension.      Palpations: Abdomen is soft.      Tenderness: There is no abdominal tenderness.   Musculoskeletal:         General: No deformity. Normal range of motion.      Cervical back: Normal range of motion and neck supple.   Skin:     General: Skin is warm.      Capillary Refill: Capillary refill takes less than 2 seconds.      Findings: No erythema.   Neurological:      Mental Status: She is alert and oriented to person, place, and time.      Cranial Nerves: No cranial nerve deficit.   Psychiatric:         Behavior: Behavior normal.       Previous EGD colonoscopy record reviewed.     Assessment:       1. Personal history of colonic polyps    2. Diverticulosis    3. Indigestion    4. Reflux gastritis    5. Dysphagia, unspecified type        Plan:   Personal history of colonic polyps  -     Case Request Operating Room: COLONOSCOPY, ESOPHAGOGASTRODUODENOSCOPY (EGD)  -     Place in Outpatient; Standing  -     Vital signs; Standing  -     Cancel: Diet NPO; Standing  -     Place CESAR hose; Standing  -     Place sequential compression device; " Standing    Diverticulosis  -     Case Request Operating Room: COLONOSCOPY, ESOPHAGOGASTRODUODENOSCOPY (EGD)  -     Place in Outpatient; Standing  -     Vital signs; Standing  -     Cancel: Diet NPO; Standing  -     Place CESAR hose; Standing  -     Place sequential compression device; Standing    Indigestion  -     Case Request Operating Room: COLONOSCOPY, ESOPHAGOGASTRODUODENOSCOPY (EGD)  -     Place in Outpatient; Standing  -     Vital signs; Standing  -     Cancel: Diet NPO; Standing  -     Place CESAR hose; Standing  -     Place sequential compression device; Standing    Reflux gastritis  -     Case Request Operating Room: COLONOSCOPY, ESOPHAGOGASTRODUODENOSCOPY (EGD)  -     Place in Outpatient; Standing  -     Vital signs; Standing  -     Cancel: Diet NPO; Standing  -     Place CESAR hose; Standing  -     Place sequential compression device; Standing    Dysphagia, unspecified type  -     Case Request Operating Room: COLONOSCOPY, ESOPHAGOGASTRODUODENOSCOPY (EGD)  -     Place in Outpatient; Standing  -     Vital signs; Standing  -     Cancel: Diet NPO; Standing  -     Place CESAR hose; Standing  -     Place sequential compression device; Standing    Other orders  -     0.9%  NaCl infusion  -     IP VTE LOW RISK PATIENT; Standing  -     SARS Coronavirus 2 Antigen, POCT Manual Read; Standing  -     Vital signs; Standing  -     Insert peripheral IV; Standing  -     Use 1% lidocaine at IV site; Standing  -     Nursing to confirm consent for anesthesia services; Standing  -     Diet NPO Except for: Medication; Standing  -     lactated ringers infusion  -     Notify Anesthesiologist if Patient on Home Insulin Pump; Standing  -     LIDOcaine (PF) 10 mg/ml (1%) injection 10 mg  -     POCT glucose; Standing  -     Pregnancy, urine rapid; Standing  -     Admit to Phase 1 PACU, transfer to Phase 2 per protocol when indicated ; Standing  -     Vital signs; Standing  -     ondansetron injection 4 mg  -     Intake and output Per  protocol; Standing  -     Apply warming blanket; Standing  -     lactated ringers infusion  -     POCT glucose; Standing        Medical Decision Making/Counseling:  Personal history of colon polyps, diverticulosis, dysphagia, gastritis, indigestion, next best steps for this patient's workup will be for high risk screening colonoscopy as well as EGD.  Risk benefits of both options were discussed in detail with patient clinic today.  After risk benefits discussion, patient voiced understanding and desires to proceed the near future.  All questions were answered patient satisfaction.    Will obtain preoperative lab test to include CBC, CMP and EKG for review by the Anesthesiologist the day of the procedure prior to induction of the anesthetic agent of choice.    Risk and benefits of EGD were discussed in clinic in depth.  Risk of EGD were discussed to include bleeding as well as perforation.  Patient understands that if the above procedural risks were to occur, he could need further intervention to include but not exclude a blood transfusion, repeat procedure, admission to the hospital, or even surgery which would likely require transfer to a higher level of care.     Risks and benefits of Colonoscopy were discussed in depth in clinic as well.  From a procedural standpoint, we discuss the benefits of colonoscopy to be finding colon cancers at early stages, including polyps which can be endoscopically resectable, to finding early stage colon cancers which can be better treated with current medical and surgical therapies in order to give patients a longer survival, if found in these early stages.  From a standpoint of risks, the risk of bleeding and perforation of the colon were discussed.  I personally discussed that if complications of bleeding or perforation were to occur, the patient could need as little as a blood transfusion and as much as possible hospital admission, repeat procedure, or even surgery.  During  today's discussion of the procedure of colonoscopy with the patient, I personally selma the patient a picture to assist with counseling.  Total clinic time spent today 45 minutes face-to-face, with greater than half of the time spent in face to face counseling.    Patient instructed that best way to communicate with my office staff is for patient to get on the [facility] [employer] patient portal to expedite communication and communication issues that may occur.  Patient was given instructions on how to get on the portal.  I encouraged patient to obtain portal access as well.  Ultimately it is up to the patient to obtain access.  Patient voiced understanding.

## 2022-05-16 NOTE — ANESTHESIA POSTPROCEDURE EVALUATION
Anesthesia Post Evaluation    Patient: Clara Dotson    Procedure(s) Performed: Procedure(s) (LRB):  COLONOSCOPY (N/A)  ESOPHAGOGASTRODUODENOSCOPY (EGD) (N/A)    Final Anesthesia Type: general      Patient location during evaluation: PACU  Patient participation: Yes- Able to Participate  Level of consciousness: awake and awake and alert  Post-procedure vital signs: reviewed and stable  Pain management: adequate  Airway patency: patent    PONV status at discharge: No PONV  Anesthetic complications: no      Cardiovascular status: blood pressure returned to baseline  Respiratory status: unassisted and spontaneous ventilation  Hydration status: euvolemic  Follow-up not needed.          Vitals Value Taken Time   /87 05/16/22 1002   Temp 36.4 °C (97.5 °F) 05/16/22 0927   Pulse 54 05/16/22 1004   Resp 11 05/16/22 1004   SpO2 100 % 05/16/22 1003   Vitals shown include unvalidated device data.      Event Time   Out of Recovery 10:01:21         Pain/Dixon Score: Dixon Score: 9 (5/16/2022  9:45 AM)  Modified Dixon Score: 20 (5/16/2022 10:00 AM)

## 2022-05-16 NOTE — [employer] PATIENT INSTRUCTIONS
Discharge Summary/Instructions after an Endoscopic Procedure  Patient Name: Clara Dotson  Patient MRN: [id number]  Patient YOB: 1965  Monday, May 16, 2022  Orestes Alvarado MD  RESTRICTIONS:  During your procedure today, you received medications for sedation.  These   medications may affect your judgment, balance and coordination.  Therefore,   for 24 hours, you have the following restrictions:   - DO NOT drive a car, operate machinery, make legal/financial decisions,   sign important papers or drink alcohol.    ACTIVITY:  Today: no heavy lifting, straining or running due to procedural   sedation/anesthesia.  The following day: return to full activity including work.  DIET:  Eat and drink normally unless instructed otherwise.     TREATMENT FOR COMMON SIDE EFFECTS:  - Mild abdominal pain, nausea, belching, bloating or excessive gas:  rest,   eat lightly and use a heating pad.  - Sore Throat: treat with throat lozenges and/or gargle with warm salt   water.  - Because air was used during the procedure, expelling large amounts of air   from your rectum or belching is normal.  - If a bowel prep was taken, you may not have a bowel movement for 1-3 days.    This is normal.  SYMPTOMS TO WATCH FOR AND REPORT TO YOUR PHYSICIAN:  1. Abdominal pain or bloating, other than gas cramps.  2. Chest pain.  3. Back pain.  4. Signs of infection such as: chills or fever occurring within 24 hours   after the procedure.  5. Rectal bleeding, which would show as bright red, maroon, or black stools.   (A tablespoon of blood from the rectum is not serious, especially if   hemorrhoids are present.)  6. Vomiting.  7. Weakness or dizziness.  GO DIRECTLY TO THE NEAREST EMERGENCY ROOM IF YOU HAVE ANY OF THE FOLLOWING:      Difficulty breathing              Chills and/or fever over 101 F   Persistent vomiting and/or vomiting blood   Severe abdominal pain   Severe chest pain   Black, tarry stools   Bleeding- more than one  tablespoon   Any other symptom or condition that you feel may need urgent attention  Your doctor recommends these additional instructions:  If any biopsies were taken, your doctors clinic will contact you in 1 to 2   weeks with any results.  - Discharge patient to home (ambulatory).   - Resume previous diet.   - Continue present medications.   - Await pathology results.   - Repeat colonoscopy in 3 years for surveillance.   - Return to my office in 2 weeks.  For questions, problems or results please call your physician - Orestes Alvarado MD at Work:  [phone].  [facility] EMERGENCY ROOM PHONE NUMBER: [phone]  IF A COMPLICATION OR EMERGENCY SITUATION ARISES AND YOU ARE UNABLE TO REACH   YOUR PHYSICIAN - GO DIRECTLY TO THE EMERGENCY ROOM.  MD Orestes Lira MD  5/16/2022 9:24:44 AM  This report has been verified and signed electronically.  Dear patient,  As a result of recent federal legislation (The Federal Cures Act), you may   receive lab or pathology results from your procedure in your [facility]   account before your physician is able to contact you. Your physician or   their representative will relay the results to you with their   recommendations at their soonest availability.  Thank you,  [employer]

## 2022-05-16 NOTE — [employer]ATION PATIENT INSTRUCTIONS
Discharge Summary/Instructions after an Endoscopic Procedure  Patient Name: Clara Dotson  Patient MRN: [id number]  Patient YOB: 1965  Monday, May 16, 2022  Orestes Alvarado MD  RESTRICTIONS:  During your procedure today, you received medications for sedation.  These   medications may affect your judgment, balance and coordination.  Therefore,   for 24 hours, you have the following restrictions:   - DO NOT drive a car, operate machinery, make legal/financial decisions,   sign important papers or drink alcohol.    ACTIVITY:  Today: no heavy lifting, straining or running due to procedural   sedation/anesthesia.  The following day: return to full activity including work.  DIET:  Eat and drink normally unless instructed otherwise.     TREATMENT FOR COMMON SIDE EFFECTS:  - Mild abdominal pain, nausea, belching, bloating or excessive gas:  rest,   eat lightly and use a heating pad.  - Sore Throat: treat with throat lozenges and/or gargle with warm salt   water.  - Because air was used during the procedure, expelling large amounts of air   from your rectum or belching is normal.  - If a bowel prep was taken, you may not have a bowel movement for 1-3 days.    This is normal.  SYMPTOMS TO WATCH FOR AND REPORT TO YOUR PHYSICIAN:  1. Abdominal pain or bloating, other than gas cramps.  2. Chest pain.  3. Back pain.  4. Signs of infection such as: chills or fever occurring within 24 hours   after the procedure.  5. Rectal bleeding, which would show as bright red, maroon, or black stools.   (A tablespoon of blood from the rectum is not serious, especially if   hemorrhoids are present.)  6. Vomiting.  7. Weakness or dizziness.  GO DIRECTLY TO THE NEAREST EMERGENCY ROOM IF YOU HAVE ANY OF THE FOLLOWING:      Difficulty breathing              Chills and/or fever over 101 F   Persistent vomiting and/or vomiting blood   Severe abdominal pain   Severe chest pain   Black, tarry stools   Bleeding- more than one  tablespoon   Any other symptom or condition that you feel may need urgent attention  Your doctor recommends these additional instructions:  If any biopsies were taken, your doctors clinic will contact you in 1 to 2   weeks with any results.  - Discharge patient to home (ambulatory).   - Resume previous diet.   - Use Protonix (pantoprazole) 40 mg PO daily.   - Await pathology results.   - Return to my office in 2 weeks.  For questions, problems or results please call your physician - Orestes Alvarado MD at Work:  [phone].  [facility] EMERGENCY ROOM PHONE NUMBER: [phone]  IF A COMPLICATION OR EMERGENCY SITUATION ARISES AND YOU ARE UNABLE TO REACH   YOUR PHYSICIAN - GO DIRECTLY TO THE EMERGENCY ROOM.  MD Orestes Lira MD  5/16/2022 9:28:26 AM  This report has been verified and signed electronically.  Dear patient,  As a result of recent federal legislation (The Federal Cures Act), you may   receive lab or pathology results from your procedure in your [facility]   account before your physician is able to contact you. Your physician or   their representative will relay the results to you with their   recommendations at their soonest availability.  Thank you,  [employer]ATION

## 2022-05-16 NOTE — ANESTHESIA PREPROCEDURE EVALUATION
05/16/2022  Clara Dotson is a 57 y.o., female.      Pre-op Assessment    I have reviewed the Patient Summary Reports.     I have reviewed the Nursing Notes.    I have reviewed the Medications.     Review of Systems  Anesthesia Hx:  No problems with previous Anesthesia  Neg history of prior surgery. Denies Family Hx of Anesthesia complications.   Denies Personal Hx of Anesthesia complications.   Social:  Non-Smoker    Hematology/Oncology:  Hematology Normal   Oncology Normal     EENT/Dental:EENT/Dental Normal   Cardiovascular:  Cardiovascular Normal     Pulmonary:   COPD, mild    Renal/:   Chronic Renal Disease    Hepatic/GI:   Hiatal Hernia, GERD Liver Disease,    Musculoskeletal:  Musculoskeletal Normal    Neurological:   TIA,    Endocrine:  Endocrine Normal    Dermatological:  Skin Normal    Psych:  Psychiatric Normal           Physical Exam  General: Alert    Airway:  Mallampati: II   Mouth Opening: Normal  TM Distance: Normal  Neck ROM: Normal ROM    Dental:  Intact    Chest/Lungs:  Clear to auscultation, Normal Respiratory Rate    Heart:  Rate: Normal  Rhythm: Regular Rhythm    Abdomen:  Normal        Anesthesia Plan  Type of Anesthesia, risks & benefits discussed:    Anesthesia Type: Gen ETT  Post Op Pain Control Plan: multimodal analgesia  Airway Plan: Direct, Post-Induction  Informed Consent: Informed consent signed with the Patient and all parties understand the risks and agree with anesthesia plan.  All questions answered. Patient consented to blood products? No  ASA Score: 2  Day of Surgery Review of History & Physical: H&P Update referred to the surgeon/provider.    Ready For Surgery From Anesthesia Perspective.     .

## 2022-05-23 LAB
FINAL PATHOLOGIC DIAGNOSIS: NORMAL
GROSS: NORMAL
Lab: NORMAL
MICROSCOPIC EXAM: NORMAL

## 2022-05-25 NOTE — PROGRESS NOTES
[facility] Hematology/Oncology  PROGRESS NOTE - Follow-up Visit      Subjective:       Patient ID:   NAME: Clara Dotson : 1965     57 y.o. female    Referring Doc: Dave  Other Physicians: Lucila (new card), Juan Diego (PCP-Jaquelin); Kalina (Neuro); Bhaskar    Chief Complaint:  breast ca  f/u    History of Present Illness:     Patient is being seen today in person.The patient is her by herself.     She has some chronic back issues which are stable. She had recent EGD and colonoscopy on 2022 with Dr Orestes Alvarado with GI at [facility] and had two polyps removed.       She is otherwise doing ok.  She denies any current CP, SOB,HA's or N/V. Breathing stable.       She had recent mammogram and CXR on 2022    She has been seeing Dr Acosta and he has been monitoring the breast clip.     I discussed COVID19 precautions - she has not been vaccinated      ROS:   GEN: normal without any fever, night sweats or weight loss  HEENT: normal with no HA's, sore throat, stiff neck, changes in vision; hearing issues  CV: normal with no CP, SOB, PND, LYNCH or orthopnea  PULM: chronic stable SOB  GI: normal with no abdominal pain, nausea, vomiting, constipation, diarrhea, melanotic stools,  BRBPR, or hematemesis;    : normal with no hematuria, dysuria  BREAST: small nodule/clip on right breast stable  SKIN: normal with no rash, erythema, bruising, or swelling    Allergies:  Review of patient's allergies indicates:   Allergen Reactions    Pcn [penicillins] Hives       Medications:    Current Outpatient Medications:     ascorbic acid, vitamin C, (VITAMIN C) 100 MG tablet, Take 100 mg by mouth once daily., Disp: , Rfl:     aspirin 325 MG tablet, Take 325 mg by mouth once daily., Disp: , Rfl:     doxepin (SINEQUAN) 10 MG capsule, Take 1-3 capsule by mouth every night as needed THANK YOU, Disp: , Rfl:     enalapril (VASOTEC) 5 MG tablet, TAKE ONE TABLET BY MOUTH EVERY DAY **THANK YOU**, Disp: 30 tablet, Rfl: 11     ezetimibe (ZETIA) 10 mg tablet, TAKE ONE TABLET BY MOUTH EVERY DAY **THANK YOU**, Disp: 30 tablet, Rfl: 11    gabapentin (NEURONTIN) 300 MG capsule, TAKE ONE CAPSULE BY MOUTH THREE TIMES DAILY **THANK YOU**, Disp: 90 capsule, Rfl: 3    hydrocodone-acetaminophen 10-325mg (NORCO)  mg Tab, every 6 (six) hours as needed. , Disp: , Rfl:     isosorbide mononitrate (IMDUR) 60 MG 24 hr tablet, TAKE ONE TABLET BY MOUTH EVERY DAY **THANK YOU**, Disp: 30 tablet, Rfl: 11    mupirocin (BACTROBAN) 2 % ointment, Apply topically 3 (three) times daily., Disp: 30 g, Rfl: 1    nitroGLYCERIN (NITROSTAT) 0.4 MG SL tablet, DISSOLVE 1 TABLET UNDER THE TONGUE FOR CHEST PAIN MAY REPEAT EVERY FIVE MINUTES FOR NO MORE THAN 3 DOSES **THANK YOU**, Disp: 25 tablet, Rfl: 0    ondansetron (ZOFRAN) 8 MG tablet, Take 1 tablet (8 mg total) by mouth every 8 (eight) hours as needed for Nausea., Disp: 30 tablet, Rfl: 0    pantoprazole (PROTONIX) 40 MG tablet, Take 1 tablet (40 mg total) by mouth once daily. Take in the morning before breakfast.  Wait 30 minutes before eating or drinking anything, Disp: 30 tablet, Rfl: 11    potassium chloride SA (K-DUR,KLOR-CON) 20 MEQ tablet, Take 1 tablet (20 mEq total) by mouth 2 (two) times daily., Disp: 60 tablet, Rfl: 2    VENTOLIN HFA 90 mcg/actuation inhaler, inhale TWO puffs into lungs EVERY 6 HOURS AS NEEDED FOR WHEEZING THANK YOU, Disp: , Rfl: 6    vitamin D (VITAMIN D3) 1000 units Tab, Take 1,000 Units by mouth once daily., Disp: , Rfl:     ZINC ORAL, Take by mouth., Disp: , Rfl:     tiotropium (SPIRIVA WITH HANDIHALER) 18 mcg inhalation capsule, Inhale 1 capsule (18 mcg total) into the lungs once daily. Controller, Disp: 30 capsule, Rfl: 5    Current Facility-Administered Medications:     cyanocobalamin injection 100 mcg, 100 mcg, Intramuscular, Q30 Days, Israel Neely MD, 1,000 mcg at 06/02/18 0945    PMHx/PSHx Updates:  See patient's last visit with me on  10/6/2021  See H&P in  "Vol #1        Pathology:  See Vol #1    EGD and colonoscopy 5/16/2022:    Final Pathologic Diagnosis 1. Duodenum, biopsy:   - Duodenal mucosa without significant histopathologic alteration   - Negative for active duodenitis or celiac-like injury   - Negative for dysplasia or malignancy   2. Stomach, antrum,biopsy:   - Mild chronic inactive gastritis   - Negative for Helicobacter pylori on H&E stain and immunostain   - Negative for intestinal metaplasia, dysplasia or malignancy   NOTE: Positive control and internal negative control for Helicobacter pylori   immunostain were examined and were appropriate.   3. Stomach, polypectomy:   - Gastric hyperplastic polyp   - Negative for Helicobacter pylori on H&E stain   - Negative for intestinal metaplasia, dysplasia or malignancy   4. Stomach, biopsies:   - Gastric fundic-type mucosa without significant histopathologic alteration   - Negative for Helicobacter pylori on H&E stain   - Negative for intestinal metaplasia, dysplasia or malignancy   5. Ano-rectal verge, polypectomies:   - Hyperplastic polyp, multiple fragments   - Negative for dysplasia or malignancy            Objective:     Vitals:  Blood pressure (!) 159/76, pulse 67, resp. rate 18, height 5' 4" (1.626 m), weight 63.5 kg (140 lb).        Physical Examination:   GEN: no apparent distress, comfortable; AAOx3  HEAD: atraumatic and normocephalic  EYES: no pallor, no icterus, PERRLA  ENT: OMM, no pharyngeal erythema, external ears WNL; no nasal discharge; no thrush  NECK: no masses, thyroid normal, trachea midline, no LAD/LN's, supple  CV: RRR with no murmur; normal pulse; normal S1 and S2; no pedal edema  CHEST: Normal respiratory effort; CTAB; normal breath sounds; no wheeze or crackles  ABDOM: nontender and nondistended; soft; normal bowel sounds; no rebound/guarding; hernia  MUSC/Skeletal: ROM normal; no crepitus; joints normal; no deformities or arthropathy  EXTREM: no clubbing, cyanosis, inflammation or " swelling  SKIN: no rashes, lesions, ulcers, petechiae or subcutaneous nodules; tattoos  : no keen  NEURO: grossly intact; motor/sensory WNL; AAOx3; no tremors  PSYCH: normal mood, affect and behavior  LYMPH: normal cervical, supraclavicular, axillary and groin LN's  Breast: small nodule/clip on right breast stable; bilateral mastectomies          Labs:      Lab Results   Component Value Date    WBC 4.08 05/13/2022    HGB 12.3 05/13/2022    HCT 36.2 (L) 05/13/2022    MCV 87 05/13/2022     05/13/2022          BMP  Lab Results   Component Value Date     05/13/2022    K 3.6 05/13/2022     05/13/2022    CO2 23 05/13/2022    BUN 16 05/13/2022    CREATININE 1.0 05/13/2022    CALCIUM 9.2 05/13/2022    ANIONGAP 10 05/13/2022    ESTGFRAFRICA >60.0 05/13/2022    EGFRNONAA >60.0 05/13/2022     Lab Results   Component Value Date    ALT 35 05/13/2022    AST 21 05/13/2022    ALKPHOS 114 05/13/2022    BILITOT 0.2 05/13/2022         Radiology/Diagnostic Studies:    Mammo 4/28/2022:    Impression:     1. No mammographic evidence of malignancy and no detrimental change.  2. Yearly mammography is recommended.  BI-RADS CATEGORY 1: NEGATIVE.       CXR 4/29/2022:  FINDINGS:  Patient rotated to the RPO position.  Mild chronic interstitial change.  No focal consolidation.     Heart size top normal.  Mediastinal contours unremarkable.  Trachea slightly rotated to the right.     Bony thorax intact.      Mammo 4/21/2021:    Impression:     No mammographic evidence of malignancy     ASSESSMENT  ACR BI-RADS Category 1  Negative     RECOMMENDATION:     Annual screening mammography is recommended.    Mammo and bilat US 4/14/2020:    FINDINGS:  Breast Density: Amost entirely fat.     No suspicious mass, microcalcification, or architectural distortion.     Bilateral breast ultrasound     Sonographic evaluation of the palpable area concern in the 3 o'clock position the right breast shows no discrete solid or cystic  mass.     Sonographic evaluation of the 10 o'clock position left breast shows no discrete solid or cystic mass.     Impression:     Bilateral mastectomies with tram reconstruction.  There is no mammographic or sonographic abnormality in either breast.     Recommendation: Annual screening mammography.     BI-RADS CATEGORY: 1  NEGATIVE        CXR 4/14/2020:    Impression       No acute pulmonary process             CT abdom/pelvis  1/31/2020:    Impression       Mild wall thickening in the right and transverse colon and in the distal small bowel, suggesting an enteritis/colitis.  Inflammatory bowel disease is a differential consideration.    No abscess, perforation, obstruction.    No evidence of metastatic malignancy.             PET/CT Fusion 6/4/2018:  IMPRESSION:    1. Negative for recurrent malignancy or metastatic disease.    2. Development of moderate hepatic steatosis.        CXR 5/31/2017 negative    I have reviewed all available lab results and radiology reports.    Assessment/Plan:     (1) 57 y.o. female with diagnosis of right breast cancer  - diagnosed in 2004  - Stage II  - s/p bilateral mastectomies  - followed by Dr Acosta with Gen Surgery  - s/p AC x4 and XRT  - triple negative  - CT scans in 3/2016  - PET on 6/4/2018 - negative for recurrent cancer  - repeat mammogram and US on 4/14/2020 negative and CXR was normal as well  - will refer her to see Dr Acosta for the area she has a concern for on the breast    1/21/2021:  - she has been seeing Dr Acosta about the breast nodule and he is continuing to observe for now  - mammo due again in April 2021    10/6/2021:  - doing ok overall  - followed closely by dr Acosta  - next mammo due 4/22/2022 5/26/2022:  - mammo and CXR on 4/28/2022 appear to be stable  - repeat mammo in one year  - follow-up with Dr acosta as directed     (2) hx/of TIA's and PAD  - followed by Dr Whitlock with cardiology and now by Dr Gaytan     (3) COPD and former smoker  - followed by   New with pulm in past  - she is on two separate inhalers     (4) Right inferior auricular biopsy in Nov 2013 - atypical single unit junctional melanocytic hyperplasia with no evidence of malignancy  - Dr Acosta following       (5) Arthritic issues    (6) Skin cancer issues - followed by Dr Manley (Derm)    (7) Lower extremity issues - she is seeing Dr Gilman/René with Neurology at [place] and she had a nerve conduction study and she plans to see ortho in future    (8) Chronic pain syndrome - followed by Pain management - Dr Barajas    (9) Chronic dysphagia and hx/of hepatic steatosis - s/p prior colonoscopy with Dr Muro  - she plans to see GI with [facility] in [place] in near future    10/6/2021:  - she still has not followed up with GI for repeat endoscopies - I encouraged her to do so as soon as she can    5/26/2022:  - s/p recent EGD and colonoscopy with Dr Orestes Alvarado        (10) Hearing issues - she plans to see Dr Barajas with ENT in near future in [facility]    (11) Hx/of covid in Sept/oct 2021         1. Malignant neoplasm of upper-inner quadrant of right breast in female, estrogen receptor negative     2. Estrogen receptor negative tumor status     3. Personal history of tobacco use, presenting hazards to health             PLAN:  1. F/u with PCP, card, pulm, dermatology, neuro and Gen Surg  2. Check up to date labs as directed  3. F/u with GI as directed  4. F/u with Dr Acosta  And repeat mammo in April 2023  5. RTC in 12 months    Fax note to Chai Adamson (PCP), Bijan Acosta [place], Mishra; New/Bhaskar Shah/Christiano        Total Time spent on patient:    I spent over 15 mins of time with the patient. Reviewed results of the recently ordered labs, tests, reports and studies; made directives with regards to the results. Over half of this time was spent couseling and coordinating care, making treatment and analytical decisions; ordering necessary labs, tests and studies;  and discussing treatment options and setting up treatment plan(s) if indicated.      Discussion:       COVID-19 Discussion:    I had long discussion with patient and any applicable family about the COVID-19 coronavirus epidemic and the recommended precautions with regard to cancer and/or hematology patients. I have re-iterated the CDC recommendations for adequate hand washing, use of hand -like products, and coughing into elbow, etc. In addition, especially for our patients who are on chemotherapy and/or our otherwise immunocompromised patients, I have recommended avoidance of crowds, including movie theaters, restaurants, churches, etc. I have recommended avoidance of any sick or symptomatic family members and/or friends. I have also recommended avoidance of any raw and unwashed food products, and general avoidance of food items that have not been prepared by themselves. The patient has been asked to call us immediately with any symptom developments, issues, questions or other general concerns.          I have explained all of the above in detail and the patient understands all of the current recommendation(s). I have answered all of their questions to the best of my ability and to their complete satisfaction.   The patient is to continue with the current management plan.            Electronically signed by Israel Neely MD

## 2022-05-26 ENCOUNTER — OFFICE VISIT (OUTPATIENT)
Dept: HEMATOLOGY/ONCOLOGY | Facility: CLINIC | Age: 57
End: 2022-05-26
Payer: MEDICARE

## 2022-05-26 VITALS
BODY MASS INDEX: 23.9 KG/M2 | HEART RATE: 67 BPM | RESPIRATION RATE: 18 BRPM | WEIGHT: 140 LBS | SYSTOLIC BLOOD PRESSURE: 159 MMHG | HEIGHT: 64 IN | DIASTOLIC BLOOD PRESSURE: 76 MMHG

## 2022-05-26 DIAGNOSIS — Z17.1 MALIGNANT NEOPLASM OF UPPER-INNER QUADRANT OF RIGHT BREAST IN FEMALE, ESTROGEN RECEPTOR NEGATIVE: Primary | ICD-10-CM

## 2022-05-26 DIAGNOSIS — Z17.1 ESTROGEN RECEPTOR NEGATIVE TUMOR STATUS: ICD-10-CM

## 2022-05-26 DIAGNOSIS — Z87.891 PERSONAL HISTORY OF TOBACCO USE, PRESENTING HAZARDS TO HEALTH: ICD-10-CM

## 2022-05-26 DIAGNOSIS — C50.211 MALIGNANT NEOPLASM OF UPPER-INNER QUADRANT OF RIGHT BREAST IN FEMALE, ESTROGEN RECEPTOR NEGATIVE: Primary | ICD-10-CM

## 2022-05-26 PROCEDURE — 3077F PR MOST RECENT SYSTOLIC BLOOD PRESSURE >= 140 MM HG: ICD-10-PCS | Mod: CPTII,S$GLB,, | Performed by: INTERNAL MEDICINE

## 2022-05-26 PROCEDURE — 1159F PR MEDICATION LIST DOCUMENTED IN MEDICAL RECORD: ICD-10-PCS | Mod: CPTII,S$GLB,, | Performed by: INTERNAL MEDICINE

## 2022-05-26 PROCEDURE — 99214 PR OFFICE/OUTPT VISIT, EST, LEVL IV, 30-39 MIN: ICD-10-PCS | Mod: S$GLB,,, | Performed by: INTERNAL MEDICINE

## 2022-05-26 PROCEDURE — 1159F MED LIST DOCD IN RCRD: CPT | Mod: CPTII,S$GLB,, | Performed by: INTERNAL MEDICINE

## 2022-05-26 PROCEDURE — 3008F PR BODY MASS INDEX (BMI) DOCUMENTED: ICD-10-PCS | Mod: CPTII,S$GLB,, | Performed by: INTERNAL MEDICINE

## 2022-05-26 PROCEDURE — 3008F BODY MASS INDEX DOCD: CPT | Mod: CPTII,S$GLB,, | Performed by: INTERNAL MEDICINE

## 2022-05-26 PROCEDURE — 3078F DIAST BP <80 MM HG: CPT | Mod: CPTII,S$GLB,, | Performed by: INTERNAL MEDICINE

## 2022-05-26 PROCEDURE — 4010F ACE/ARB THERAPY RXD/TAKEN: CPT | Mod: CPTII,S$GLB,, | Performed by: INTERNAL MEDICINE

## 2022-05-26 PROCEDURE — 3078F PR MOST RECENT DIASTOLIC BLOOD PRESSURE < 80 MM HG: ICD-10-PCS | Mod: CPTII,S$GLB,, | Performed by: INTERNAL MEDICINE

## 2022-05-26 PROCEDURE — 3077F SYST BP >= 140 MM HG: CPT | Mod: CPTII,S$GLB,, | Performed by: INTERNAL MEDICINE

## 2022-05-26 PROCEDURE — 4010F PR ACE/ARB THEARPY RXD/TAKEN: ICD-10-PCS | Mod: CPTII,S$GLB,, | Performed by: INTERNAL MEDICINE

## 2022-05-26 PROCEDURE — 1160F RVW MEDS BY RX/DR IN RCRD: CPT | Mod: CPTII,S$GLB,, | Performed by: INTERNAL MEDICINE

## 2022-05-26 PROCEDURE — 99214 OFFICE O/P EST MOD 30 MIN: CPT | Mod: S$GLB,,, | Performed by: INTERNAL MEDICINE

## 2022-05-26 PROCEDURE — 1160F PR REVIEW ALL MEDS BY PRESCRIBER/CLIN PHARMACIST DOCUMENTED: ICD-10-PCS | Mod: CPTII,S$GLB,, | Performed by: INTERNAL MEDICINE

## 2022-05-31 ENCOUNTER — PATIENT MESSAGE (OUTPATIENT)
Dept: ADMINISTRATIVE | Facility: HOSPITAL | Age: 57
End: 2022-05-31
Payer: MEDICARE

## 2022-06-13 DIAGNOSIS — J44.9 CHRONIC OBSTRUCTIVE PULMONARY DISEASE, UNSPECIFIED COPD TYPE: Primary | ICD-10-CM

## 2022-06-17 RX ORDER — TIOTROPIUM BROMIDE 18 UG/1
1 CAPSULE ORAL; RESPIRATORY (INHALATION) DAILY
Qty: 30 CAPSULE | Refills: 2 | Status: SHIPPED | OUTPATIENT
Start: 2022-06-17 | End: 2023-01-30

## 2022-06-17 RX ORDER — ALBUTEROL SULFATE 90 UG/1
2 AEROSOL, METERED RESPIRATORY (INHALATION) EVERY 6 HOURS PRN
Qty: 18 G | Refills: 2 | Status: SHIPPED | OUTPATIENT
Start: 2022-06-17 | End: 2023-06-17

## 2022-10-03 DIAGNOSIS — J44.9 CHRONIC OBSTRUCTIVE PULMONARY DISEASE, UNSPECIFIED COPD TYPE: Primary | ICD-10-CM

## 2022-10-04 RX ORDER — TIOTROPIUM BROMIDE 18 UG/1
1 CAPSULE ORAL; RESPIRATORY (INHALATION) DAILY
Qty: 30 CAPSULE | Refills: 2 | OUTPATIENT
Start: 2022-10-04 | End: 2023-10-04

## 2022-10-07 DIAGNOSIS — J44.9 CHRONIC OBSTRUCTIVE PULMONARY DISEASE, UNSPECIFIED COPD TYPE: Primary | ICD-10-CM

## 2022-10-07 RX ORDER — ALBUTEROL SULFATE 90 UG/1
2 AEROSOL, METERED RESPIRATORY (INHALATION) EVERY 6 HOURS PRN
Qty: 18 G | Refills: 11 | Status: SHIPPED | OUTPATIENT
Start: 2022-10-07 | End: 2023-10-07

## 2022-10-10 ENCOUNTER — HOSPITAL ENCOUNTER (EMERGENCY)
Facility: HOSPITAL | Age: 57
Discharge: HOME | End: 2022-10-10
Payer: MEDICARE

## 2022-10-10 VITALS
HEIGHT: 64 IN | OXYGEN SATURATION: 99 % | HEART RATE: 60 BPM | BODY MASS INDEX: 23.9 KG/M2 | SYSTOLIC BLOOD PRESSURE: 128 MMHG | RESPIRATION RATE: 14 BRPM | TEMPERATURE: 98 F | WEIGHT: 140 LBS | DIASTOLIC BLOOD PRESSURE: 77 MMHG

## 2022-10-10 DIAGNOSIS — R10.30 PAIN RADIATING TO LOWER ABDOMEN: ICD-10-CM

## 2022-10-10 DIAGNOSIS — R52 PAIN: ICD-10-CM

## 2022-10-10 DIAGNOSIS — R10.9 ABDOMINAL PAIN: Primary | ICD-10-CM

## 2022-10-10 LAB
ALBUMIN SERPL BCP-MCNC: 4.1 G/DL (ref 3.5–5.2)
ALP SERPL-CCNC: 123 U/L (ref 55–135)
ALT SERPL W/O P-5'-P-CCNC: 37 U/L (ref 10–44)
ANION GAP SERPL CALC-SCNC: 14 MMOL/L (ref 8–16)
AST SERPL-CCNC: 23 U/L (ref 10–40)
BASOPHILS # BLD AUTO: 0.03 K/UL (ref 0–0.2)
BASOPHILS NFR BLD: 0.8 % (ref 0–1.9)
BILIRUB SERPL-MCNC: 0.5 MG/DL (ref 0.1–1)
BILIRUB UR QL STRIP: NEGATIVE
BUN SERPL-MCNC: 13 MG/DL (ref 6–20)
CALCIUM SERPL-MCNC: 9.2 MG/DL (ref 8.7–10.5)
CHLORIDE SERPL-SCNC: 105 MMOL/L (ref 95–110)
CLARITY UR: CLEAR
CO2 SERPL-SCNC: 25 MMOL/L (ref 23–29)
COLOR UR: YELLOW
CREAT SERPL-MCNC: 1 MG/DL (ref 0.5–1.4)
DIFFERENTIAL METHOD: ABNORMAL
EOSINOPHIL # BLD AUTO: 0.1 K/UL (ref 0–0.5)
EOSINOPHIL NFR BLD: 2.8 % (ref 0–8)
ERYTHROCYTE [DISTWIDTH] IN BLOOD BY AUTOMATED COUNT: 12.8 % (ref 11.5–14.5)
EST. GFR  (NO RACE VARIABLE): >60 ML/MIN/1.73 M^2
GLUCOSE SERPL-MCNC: 105 MG/DL (ref 70–110)
GLUCOSE UR QL STRIP: NEGATIVE
HCT VFR BLD AUTO: 35.6 % (ref 37–48.5)
HGB BLD-MCNC: 12.5 G/DL (ref 12–16)
HGB UR QL STRIP: NEGATIVE
IMM GRANULOCYTES # BLD AUTO: 0.01 K/UL (ref 0–0.04)
IMM GRANULOCYTES NFR BLD AUTO: 0.3 % (ref 0–0.5)
KETONES UR QL STRIP: NEGATIVE
LEUKOCYTE ESTERASE UR QL STRIP: NEGATIVE
LYMPHOCYTES # BLD AUTO: 1.6 K/UL (ref 1–4.8)
LYMPHOCYTES NFR BLD: 42.4 % (ref 18–48)
MAGNESIUM SERPL-MCNC: 2.1 MG/DL (ref 1.6–2.6)
MCH RBC QN AUTO: 29.8 PG (ref 27–31)
MCHC RBC AUTO-ENTMCNC: 35.1 G/DL (ref 32–36)
MCV RBC AUTO: 85 FL (ref 82–98)
MONOCYTES # BLD AUTO: 0.4 K/UL (ref 0.3–1)
MONOCYTES NFR BLD: 9.8 % (ref 4–15)
NEUTROPHILS # BLD AUTO: 1.7 K/UL (ref 1.8–7.7)
NEUTROPHILS NFR BLD: 43.9 % (ref 38–73)
NITRITE UR QL STRIP: NEGATIVE
NRBC BLD-RTO: 0 /100 WBC
PH UR STRIP: 7 [PH] (ref 5–8)
PLATELET # BLD AUTO: 248 K/UL (ref 150–450)
PMV BLD AUTO: 9.6 FL (ref 9.2–12.9)
POTASSIUM SERPL-SCNC: 3.6 MMOL/L (ref 3.5–5.1)
PROT SERPL-MCNC: 7.1 G/DL (ref 6–8.4)
PROT UR QL STRIP: NEGATIVE
RBC # BLD AUTO: 4.19 M/UL (ref 4–5.4)
SODIUM SERPL-SCNC: 144 MMOL/L (ref 136–145)
SP GR UR STRIP: <=1.005 (ref 1–1.03)
TROPONIN I SERPL DL<=0.01 NG/ML-MCNC: <0.006 NG/ML (ref 0–0.03)
URN SPEC COLLECT METH UR: NORMAL
UROBILINOGEN UR STRIP-ACNC: NEGATIVE EU/DL
WBC # BLD AUTO: 3.87 K/UL (ref 3.9–12.7)

## 2022-10-10 PROCEDURE — 74176 CT ABD & PELVIS W/O CONTRAST: CPT | Mod: 26,,, | Performed by: RADIOLOGY

## 2022-10-10 PROCEDURE — 93010 ELECTROCARDIOGRAM REPORT: CPT | Mod: ,,, | Performed by: INTERNAL MEDICINE

## 2022-10-10 PROCEDURE — 93010 EKG 12-LEAD: ICD-10-PCS | Mod: ,,, | Performed by: INTERNAL MEDICINE

## 2022-10-10 PROCEDURE — 80053 COMPREHEN METABOLIC PANEL: CPT | Performed by: NURSE PRACTITIONER

## 2022-10-10 PROCEDURE — 83735 ASSAY OF MAGNESIUM: CPT | Performed by: NURSE PRACTITIONER

## 2022-10-10 PROCEDURE — 74176 CT ABDOMEN PELVIS WITHOUT CONTRAST: ICD-10-PCS | Mod: 26,,, | Performed by: RADIOLOGY

## 2022-10-10 PROCEDURE — 85025 COMPLETE CBC W/AUTO DIFF WBC: CPT | Performed by: NURSE PRACTITIONER

## 2022-10-10 PROCEDURE — 99285 EMERGENCY DEPT VISIT HI MDM: CPT | Mod: 25

## 2022-10-10 PROCEDURE — 81003 URINALYSIS AUTO W/O SCOPE: CPT | Performed by: NURSE PRACTITIONER

## 2022-10-10 PROCEDURE — 84484 ASSAY OF TROPONIN QUANT: CPT | Performed by: NURSE PRACTITIONER

## 2022-10-10 PROCEDURE — 93005 ELECTROCARDIOGRAM TRACING: CPT

## 2022-10-10 PROCEDURE — 74176 CT ABD & PELVIS W/O CONTRAST: CPT | Mod: TC

## 2022-10-10 NOTE — ED PROVIDER NOTES
Encounter Date: 10/10/2022       History     Chief Complaint   Patient presents with    Abdominal Pain     R side abdominal pain since this morning. Denies n/v/d. Denies urinary complaints.     Patient 49 year female presents emergency room with right upper quadrant abdominal pain that started around 6:00 a.m. this morning.  Patient states she had gotten up, moved around, and had breakfast.  After breakfast, pain started, and she states she went had her morning bowel movement.  She denies any dark tarry or bloody stools, denies any diarrhea.  She denies having any fever, chills, chest pain, shortness of breath.  She does states she had some mid back pain yesterday, but states she feels that that was just her comment normal neck and back aches.  She states she has chronic disc disease and degenerative bones in her spinal column.  Patient denies any dysuria, substernal chest pain,.  She states she did feel a little short of breath this morning when pain started, but she does use inhalers, which she did not use this morning.  Patient denies any shortness of breath this time.  She denies any fever, chills, diaphoresis, low back pain, urinary urgency or frequency.  She denies any dysuria or discharge.  Patient states she has history of fatty liver, history of cholecystectomy as well.    Review of patient's allergies indicates:   Allergen Reactions    Adhesive      Silk tape (white tape)     Pcn [penicillins] Hives    Penicillin      Past Medical History:   Diagnosis Date    Anemia     Estrogen receptor negative status (ER-) 5/15/2017    Malignant neoplasm of right breast 5/15/2017    Skin cancer     TIA (transient ischemic attack) 2010     Past Surgical History:   Procedure Laterality Date    BILATERAL MASTECTOMY      BREAST RECONSTRUCTION      CHOLECYSTECTOMY      colon polyp removal      COLONOSCOPY N/A 5/16/2022    Procedure: COLONOSCOPY;  Surgeon: Orestes Alvarado MD;  Location: [facility];  Service: General;   Laterality: N/A;    ESOPHAGEAL MANOMETRY WITH MEASUREMENT OF IMPEDANCE N/A 2020    Procedure: MANOMETRY, ESOPHAGUS, WITH IMPEDANCE MEASUREMENT;  Surgeon: Lane Hu MD;  Location: [facility] ([address] [place] [address]);  Service: Endoscopy;  Laterality: N/A;  COVID on 20 at [facility]    ESOPHAGOGASTRODUODENOSCOPY N/A 2020    Procedure: EGD (ESOPHAGOGASTRODUODENOSCOPY);  Surgeon: Bayron Adamson MD;  Location: [facility];  Service: Endoscopy;  Laterality: N/A;    ESOPHAGOGASTRODUODENOSCOPY N/A 2022    Procedure: ESOPHAGOGASTRODUODENOSCOPY (EGD);  Surgeon: Orestes Alvarado MD;  Location: [facility];  Service: General;  Laterality: N/A;    HYSTERECTOMY      LIPOSUCTION      MASTECTOMY      melanoma surgery      NECK SURGERY      reconstructive breast       tummy ck       Family History   Problem Relation Age of Onset    Cancer Mother     Hypertension Mother     Diabetes Mother     Breast cancer Father     Cancer Father     Hypertension Father     Breast cancer Sister         two sisters with breast cancer     Social History     Tobacco Use    Smoking status: Former     Types: Cigarettes     Quit date:      Years since quittin.7    Smokeless tobacco: Never   Substance Use Topics    Alcohol use: No    Drug use: No     Review of Systems   Constitutional: Negative.    HENT: Negative.     Eyes: Negative.    Respiratory: Negative.     Cardiovascular: Negative.    Gastrointestinal:  Positive for abdominal pain. Negative for blood in stool, constipation, diarrhea, nausea and vomiting.   Endocrine: Negative.    Genitourinary: Negative.    Musculoskeletal: Negative.    Skin: Negative.    Allergic/Immunologic: Negative for food allergies.   Neurological: Negative.    Hematological: Negative.    Psychiatric/Behavioral: Negative.     All other systems reviewed and are negative.    Physical Exam     Initial Vitals   BP Pulse Resp Temp SpO2   10/10/22 1012 10/10/22 1010 10/10/22 1010 10/10/22 1010 10/10/22 1010   (!)  159/85 60 20 97.6 °F (36.4 °C) 99 %      MAP       --                Physical Exam    Nursing note and vitals reviewed.  Constitutional: She appears well-developed and well-nourished. She is not diaphoretic. No distress.   HENT:   Head: Normocephalic and atraumatic.   Mouth/Throat: Oropharynx is clear and moist.   Eyes: Conjunctivae and EOM are normal. Pupils are equal, round, and reactive to light. No scleral icterus.   Neck: No thyromegaly present.   Normal range of motion.  Cardiovascular:  Normal rate, regular rhythm and normal heart sounds.           No murmur heard.  Abdominal: Abdomen is soft. Bowel sounds are normal. She exhibits no distension and no mass. There is generalized abdominal tenderness (Right upper quadrant, worse with palpation.) and tenderness in the right upper quadrant. A hernia is present. Hernia confirmed positive in the umbilical area (History of). There is positive Espana's sign. There is no rebound, no guarding and no tenderness at McBurney's point. negative psoas sign  Musculoskeletal:         General: No tenderness or edema. Normal range of motion.      Cervical back: Normal range of motion.     Neurological: She is alert and oriented to person, place, and time. She has normal strength. No sensory deficit. GCS score is 15. GCS eye subscore is 4. GCS verbal subscore is 5. GCS motor subscore is 6.   Skin: Skin is warm and dry. Capillary refill takes 2 to 3 seconds.   Psychiatric: She has a normal mood and affect.       ED Course   Procedures  Labs Reviewed   CBC W/ AUTO DIFFERENTIAL - Abnormal; Notable for the following components:       Result Value    WBC 3.87 (*)     Hematocrit 35.6 (*)     Gran # (ANC) 1.7 (*)     All other components within normal limits   TROPONIN I   URINALYSIS, REFLEX TO URINE CULTURE    Narrative:     Preferred Collection Type->Urine, Clean Catch  Specimen Source->Urine   COMPREHENSIVE METABOLIC PANEL   MAGNESIUM     EKG Readings: (Independently Interpreted)    Initial Reading: No STEMI. Previous EKG Date: 05/13/22. Rhythm: Normal Sinus Rhythm. Ectopy: No Ectopy. Conduction: Normal. ST Segments: Normal ST Segments. Axis: Normal. Clinical Impression: Normal Sinus Rhythm   Sinus rhythm noted on EKG, heart rate 61, NV interval 1 weight, normal QRS, no ST elevation or depression noted inverted T-wave on V1 noted.   ECG Results              EKG 12-lead (Preliminary result)  Result time 10/10/22 10:47:55      ED Interpretation by Toby Sosa MD (10/10/22 10:47:55, [facility], Emergency Medicine)    Normal sinus rhythm with a rate of 61 and no acute ischemic changes                                  Imaging Results              CT Abdomen Pelvis  Without Contrast (Final result)  Result time 10/10/22 12:12:20   Procedure changed from CT Abdomen Without Contrast     Final result by Kirill Moran MD (10/10/22 12:12:20)                   Impression:      1. Heterogeneous decreased attenuation of the hepatic parenchyma most consistent with fatty infiltration.  Infiltrative mass, however, not completely excludable.  Further evaluation with either nonemergent multiphasic contrast enhanced CT or contrast enhanced MRI as deemed clinically necessary.  2. Prior hysterectomy.  3. Prior ventral hernia repair.      Electronically signed by: Kirill Moran  Date:    10/10/2022  Time:    12:12               Narrative:    EXAMINATION:  CT ABDOMEN PELVIS WITHOUT CONTRAST    CLINICAL HISTORY:  RUQ abdominal pain, US nondiagnostic;    TECHNIQUE:  Low dose axial images, sagittal and coronal reformations were obtained from the lung bases to the pubic symphysis.  Oral contrast was not administered.    COMPARISON:  CT 04/30/2020.    FINDINGS:  Liver is normal in size demonstrating heterogeneous decreased attenuation consistent with diffuse fatty infiltration.  Previous cholecystectomy.  The spleen, pancreas and adrenal glands are unremarkable.    Kidneys are normal in size and  attenuation.  No renal calculi.  No changes of hydronephrosis.  No perinephric inflammatory change.    Air and stool throughout the colon and rectum.  No mesenteric inflammatory change.  The appendix is not definitely identified.  No pericecal inflammatory change to suggest acute appendicitis.    The bladder is partially distended.  Prior hysterectomy.  Vaginal cuff and rectum unremarkable.    No significant mesenteric or retroperitoneal lymphadenopathy.  Postsurgical change from prior ventral hernia repair.  Surgical clips within the right groin.                                    X-Rays:   Independently Interpreted Readings:   Other Readings:  CT abdomen pelvis     FINDINGS:  Liver is normal in size demonstrating heterogeneous decreased attenuation consistent with diffuse fatty infiltration.  Previous cholecystectomy.  The spleen, pancreas and adrenal glands are unremarkable.     Kidneys are normal in size and attenuation.  No renal calculi.  No changes of hydronephrosis.  No perinephric inflammatory change.     Air and stool throughout the colon and rectum.  No mesenteric inflammatory change.  The appendix is not definitely identified.  No pericecal inflammatory change to suggest acute appendicitis.     The bladder is partially distended.  Prior hysterectomy.  Vaginal cuff and rectum unremarkable.     No significant mesenteric or retroperitoneal lymphadenopathy.  Postsurgical change from prior ventral hernia repair.  Surgical clips within the right groin.     Impression:     1. Heterogeneous decreased attenuation of the hepatic parenchyma most consistent with fatty infiltration.  Infiltrative mass, however, not completely excludable.  Further evaluation with either nonemergent multiphasic contrast enhanced CT or contrast enhanced MRI as deemed clinically necessary.  2. Prior hysterectomy.  3. Prior ventral hernia repair.  Medications - No data to display  Medical Decision Making:   Initial Assessment:   Patient  seen examined emergency room.  Assessment as noted above.  Differential Diagnosis:   Constipation, diverticulitis, diverticulosis, gastritis, gastroparesis, transaminitis, mi, AAA  Clinical Tests:   Lab Tests: Ordered and Reviewed  The following lab test(s) were unremarkable: CBC, CMP, Urinalysis and Troponin  Radiological Study: Ordered and Reviewed  Medical Tests: Ordered and Reviewed  ED Management:  Patient seen examined emergency room.  Labs, x-ray, EKG all reviewed.  CT abdomen pelvis shows Heterogeneous decreased attenuation of the hepatic parenchyma most consistent with fatty infiltration.  Infiltrative mass, however, not completely excludable.  Further evaluation with either nonemergent multiphasic contrast enhanced CT or contrast enhanced MRI as deemed clinically necessary.  Discussed findings with patient.  Patient will follow-up primary care provider and her oncologist for further evaluation and testing.                          Clinical Impression:   Final diagnoses:  [R10.9] Abdominal pain (Primary)  [R52] Pain  [R10.30] Pain radiating to lower abdomen        ED Disposition Condition    Discharge Stable          ED Prescriptions    None       Follow-up Information       Follow up With Specialties Details Why Contact Info    Chai Adamson MD Family Medicine In 3 days If symptoms worsen, As needed [address] [facility] [address]  [facility] [address]  [phone]               Brian Khan NP  10/10/22 1470

## 2023-01-18 ENCOUNTER — TELEPHONE (OUTPATIENT)
Dept: FAMILY MEDICINE | Facility: CLINIC | Age: 58
End: 2023-01-18
Payer: MEDICAID

## 2023-01-18 NOTE — TELEPHONE ENCOUNTER
Attempted to contact Clara Dotson to discuss Scheduling an appointment.    Unable to leave message on phone - no voicemail or mailbox full on [phone] (home).    Antonia Del Cid LPN

## 2023-01-18 NOTE — TELEPHONE ENCOUNTER
----- Message from Susan Yepez sent at 1/18/2023  1:28 PM CST -----  Type: Patient Call Back         Who called: Patient         What is the request in detail: calling top sched annual; prefers latest in evening appt; prefers Thursdays if possible; please advise        Best call back number: [phone]         Additional Information:            Thank You

## 2023-01-30 ENCOUNTER — OFFICE VISIT (OUTPATIENT)
Dept: PULMONOLOGY | Facility: CLINIC | Age: 58
End: 2023-01-30
Payer: MEDICARE

## 2023-01-30 VITALS
BODY MASS INDEX: 23.89 KG/M2 | HEART RATE: 75 BPM | SYSTOLIC BLOOD PRESSURE: 110 MMHG | OXYGEN SATURATION: 98 % | WEIGHT: 139.19 LBS | DIASTOLIC BLOOD PRESSURE: 72 MMHG

## 2023-01-30 DIAGNOSIS — J44.9 CHRONIC OBSTRUCTIVE PULMONARY DISEASE, UNSPECIFIED COPD TYPE: Primary | ICD-10-CM

## 2023-01-30 PROCEDURE — 3074F SYST BP LT 130 MM HG: CPT | Mod: CPTII,S$GLB,, | Performed by: INTERNAL MEDICINE

## 2023-01-30 PROCEDURE — 3008F BODY MASS INDEX DOCD: CPT | Mod: CPTII,S$GLB,, | Performed by: INTERNAL MEDICINE

## 2023-01-30 PROCEDURE — 3078F PR MOST RECENT DIASTOLIC BLOOD PRESSURE < 80 MM HG: ICD-10-PCS | Mod: CPTII,S$GLB,, | Performed by: INTERNAL MEDICINE

## 2023-01-30 PROCEDURE — 99214 OFFICE O/P EST MOD 30 MIN: CPT | Mod: S$GLB,,, | Performed by: INTERNAL MEDICINE

## 2023-01-30 PROCEDURE — 1159F PR MEDICATION LIST DOCUMENTED IN MEDICAL RECORD: ICD-10-PCS | Mod: CPTII,S$GLB,, | Performed by: INTERNAL MEDICINE

## 2023-01-30 PROCEDURE — 3074F PR MOST RECENT SYSTOLIC BLOOD PRESSURE < 130 MM HG: ICD-10-PCS | Mod: CPTII,S$GLB,, | Performed by: INTERNAL MEDICINE

## 2023-01-30 PROCEDURE — 3078F DIAST BP <80 MM HG: CPT | Mod: CPTII,S$GLB,, | Performed by: INTERNAL MEDICINE

## 2023-01-30 PROCEDURE — 1159F MED LIST DOCD IN RCRD: CPT | Mod: CPTII,S$GLB,, | Performed by: INTERNAL MEDICINE

## 2023-01-30 PROCEDURE — 1160F RVW MEDS BY RX/DR IN RCRD: CPT | Mod: CPTII,S$GLB,, | Performed by: INTERNAL MEDICINE

## 2023-01-30 PROCEDURE — 1160F PR REVIEW ALL MEDS BY PRESCRIBER/CLIN PHARMACIST DOCUMENTED: ICD-10-PCS | Mod: CPTII,S$GLB,, | Performed by: INTERNAL MEDICINE

## 2023-01-30 PROCEDURE — 99214 PR OFFICE/OUTPT VISIT, EST, LEVL IV, 30-39 MIN: ICD-10-PCS | Mod: S$GLB,,, | Performed by: INTERNAL MEDICINE

## 2023-01-30 PROCEDURE — 3008F PR BODY MASS INDEX (BMI) DOCUMENTED: ICD-10-PCS | Mod: CPTII,S$GLB,, | Performed by: INTERNAL MEDICINE

## 2023-01-30 RX ORDER — DOXYCYCLINE 100 MG/1
100 CAPSULE ORAL 2 TIMES DAILY
COMMUNITY
Start: 2023-01-09 | End: 2023-01-31 | Stop reason: SDUPTHER

## 2023-01-30 RX ORDER — TIOTROPIUM BROMIDE 18 UG/1
1 CAPSULE ORAL; RESPIRATORY (INHALATION) DAILY
Qty: 30 CAPSULE | Refills: 11 | Status: SHIPPED | OUTPATIENT
Start: 2023-01-30

## 2023-01-30 NOTE — TELEPHONE ENCOUNTER
----- Message from Martha Wolff sent at 3/11/2019  3:48 PM CDT -----  Contact: Clara  Type:  RX Refill Request    Who Called:  patient  Refill or New Rx:  refill  RX Name and Strength:  enalapril (VASOTEC) 5 MG tablet, ezetimibe (ZETIA) 10 mg tablet, & isosorbide mononitrate (IMDUR) 60 MG 24 hr tablet  How is the patient currently taking it? (ex. 1XDay):  As directed  Is this a 30 day or 90 day RX:  30  Preferred Pharmacy with phone number:    [facility] - [place] - [address]  [address]  [place] [address]  Phone: [phone] Fax: [phone]    Local or Mail Order:  local  Ordering Provider:  Juan Diego Hummel Call Back Number:  [phone]  Additional Information:  Please advise--thank you   warm

## 2023-01-30 NOTE — PROGRESS NOTES
"Office Visit    Patient Name: Clara Dotson  MRN: [id number]  : 1965      Reason for visit: COPD    HPI:     10/2/2018 - Referred by Dr Neely for evaluation for possible COPD.  She states that she had PFT "years ago".  Has trouble with SOB, LYNCH.  Had PFT done at [facility] a few months ago.  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  She has been diagnosed with "fatty liver" and is to see GI MD.  Former smoker quit , smoked for about 15 years about 1 PPD.  Worked at [employer] ([phone]) did have exposure tyo chemicals and asbestos (gaskets).    12/10/2019 - Here for follow up about 1 year late, we never got PFT from [facility].  Didn't feel that ANORO helped, does feel that rescue med helps.  No new issues reported.  Has been diagnosed with a hiatal hernia.    2023 - Here for follow up, had Covid a few years ago and was sick for about 14 days - not hospitalized.  Doing better from that and has noted some chronic HA since then.  Breathing has stable overall.Does have some issues when weather is humid.  Still with issues with her hiatal hernia and needs to heave another surgery.    2023 - PFT 23  No obstruction  No restriction  Mild decrease DLCO   No change since     Past Medical History    Past Medical History:   Diagnosis Date    Anemia     Estrogen receptor negative status (ER-) 5/15/2017    Malignant neoplasm of right breast 5/15/2017    Skin cancer     TIA (transient ischemic attack)        Past Surgical History    Past Surgical History:   Procedure Laterality Date    BILATERAL MASTECTOMY      BREAST RECONSTRUCTION      CHOLECYSTECTOMY      colon polyp removal      COLONOSCOPY N/A 2022    Procedure: COLONOSCOPY;  Surgeon: Orestes Alvarado MD;  Location: [facility];  Service: General;  Laterality: N/A;    ESOPHAGEAL MANOMETRY WITH MEASUREMENT OF IMPEDANCE N/A 2020    Procedure: MANOMETRY, ESOPHAGUS, WITH IMPEDANCE MEASUREMENT;  Surgeon: " Lane Hu MD;  Location: [facility] ENDO (4TH FLR);  Service: Endoscopy;  Laterality: N/A;  COVID on 6/29/20 at [facility]    ESOPHAGOGASTRODUODENOSCOPY N/A 1/14/2020    Procedure: EGD (ESOPHAGOGASTRODUODENOSCOPY);  Surgeon: Bayron Adamson MD;  Location: [facility] ENDO;  Service: Endoscopy;  Laterality: N/A;    ESOPHAGOGASTRODUODENOSCOPY N/A 5/16/2022    Procedure: ESOPHAGOGASTRODUODENOSCOPY (EGD);  Surgeon: Orestes Alvarado MD;  Location: [facility] ENDO;  Service: General;  Laterality: N/A;    HYSTERECTOMY      LIPOSUCTION      MASTECTOMY      melanoma surgery      NECK SURGERY      reconstructive breast       tummy [facility]         Medications      Current Outpatient Medications:     albuterol (PROVENTIL/VENTOLIN HFA) 90 mcg/actuation inhaler, Inhale 2 puffs into the lungs every 6 (six) hours as needed for Wheezing. Rescue, Disp: 18 g, Rfl: 2    albuterol (VENTOLIN HFA) 90 mcg/actuation inhaler, Inhale 2 puffs into the lungs every 6 (six) hours as needed for Wheezing. Rescue, Disp: 18 g, Rfl: 11    ascorbic acid, vitamin C, (VITAMIN C) 100 MG tablet, Take 100 mg by mouth once daily., Disp: , Rfl:     aspirin 325 MG tablet, Take 325 mg by mouth once daily., Disp: , Rfl:     doxepin (SINEQUAN) 10 MG capsule, Take 1-3 capsule by mouth every night as needed THANK YOU, Disp: , Rfl:     doxycycline (VIBRAMYCIN) 100 MG Cap, Take 100 mg by mouth 2 (two) times daily., Disp: , Rfl:     enalapril (VASOTEC) 5 MG tablet, TAKE ONE TABLET BY MOUTH EVERY DAY **THANK YOU**, Disp: 30 tablet, Rfl: 11    ezetimibe (ZETIA) 10 mg tablet, TAKE ONE TABLET BY MOUTH EVERY DAY **THANK YOU**, Disp: 30 tablet, Rfl: 11    gabapentin (NEURONTIN) 300 MG capsule, TAKE ONE CAPSULE BY MOUTH THREE TIMES DAILY **THANK YOU**, Disp: 90 capsule, Rfl: 3    hydrocodone-acetaminophen 10-325mg (NORCO)  mg Tab, every 6 (six) hours as needed. , Disp: , Rfl:     isosorbide mononitrate (IMDUR) 60 MG 24 hr tablet, TAKE ONE TABLET BY MOUTH EVERY DAY **THANK YOU**,  Disp: 30 tablet, Rfl: 11    mupirocin (BACTROBAN) 2 % ointment, Apply topically 3 (three) times daily., Disp: 30 g, Rfl: 1    nitroGLYCERIN (NITROSTAT) 0.4 MG SL tablet, DISSOLVE 1 TABLET UNDER THE TONGUE FOR CHEST PAIN MAY REPEAT EVERY FIVE MINUTES FOR NO MORE THAN 3 DOSES **THANK YOU**, Disp: 25 tablet, Rfl: 0    ondansetron (ZOFRAN) 8 MG tablet, Take 1 tablet (8 mg total) by mouth every 8 (eight) hours as needed for Nausea., Disp: 30 tablet, Rfl: 0    pantoprazole (PROTONIX) 40 MG tablet, Take 1 tablet (40 mg total) by mouth once daily. Take in the morning before breakfast.  Wait 30 minutes before eating or drinking anything, Disp: 30 tablet, Rfl: 11    potassium chloride SA (K-DUR,KLOR-CON) 20 MEQ tablet, Take 1 tablet (20 mEq total) by mouth 2 (two) times daily., Disp: 60 tablet, Rfl: 2    tiotropium (SPIRIVA WITH HANDIHALER) 18 mcg inhalation capsule, Inhale 1 capsule (18 mcg total) into the lungs once daily. Controller, Disp: 30 capsule, Rfl: 2    VENTOLIN HFA 90 mcg/actuation inhaler, inhale TWO puffs into lungs EVERY 6 HOURS AS NEEDED FOR WHEEZING THANK YOU, Disp: , Rfl: 6    vitamin D (VITAMIN D3) 1000 units Tab, Take 1,000 Units by mouth once daily., Disp: , Rfl:     ZINC ORAL, Take by mouth., Disp: , Rfl:     Current Facility-Administered Medications:     cyanocobalamin injection 100 mcg, 100 mcg, Intramuscular, Q30 Days, Israel Neely MD, 1,000 mcg at 18 0945    Allergies    Review of patient's allergies indicates:   Allergen Reactions    Pcn [penicillins] Hives       SocHx    Social History     Tobacco Use   Smoking Status Former    Types: Cigarettes    Quit date:     Years since quittin.0   Smokeless Tobacco Never       Social History     Substance and Sexual Activity   Alcohol Use No       Drug Use - no  Occupation - retired, as above  Asbestos exposure - possible  Pets - dog    FMHx    Family History   Problem Relation Age of Onset    Cancer Mother     Hypertension Mother      Diabetes Mother     Breast cancer Father     Cancer Father     Hypertension Father     Breast cancer Sister         two sisters with breast cancer         Review of Systems  Review of Systems   Constitutional:  Negative for chills, diaphoresis, fever, malaise/fatigue and weight loss.   HENT:  Positive for hearing loss. Negative for congestion, ear discharge, ear pain, nosebleeds, sinus pain, sore throat and tinnitus.    Eyes:  Positive for blurred vision. Negative for double vision, photophobia, pain, discharge and redness.   Respiratory:  Positive for shortness of breath and wheezing. Negative for cough, hemoptysis, sputum production and stridor.    Cardiovascular:  Negative for chest pain, palpitations, orthopnea, claudication, leg swelling and PND.        Had recent heart evaluation which was negative   Gastrointestinal:  Positive for heartburn. Negative for abdominal pain, blood in stool, constipation, diarrhea, melena, nausea and vomiting.        Polyps   Genitourinary:  Negative for dysuria, flank pain, frequency, hematuria and urgency.   Musculoskeletal:  Negative for back pain, falls, joint pain, myalgias and neck pain.   Skin:  Negative for itching and rash.   Neurological:  Negative for dizziness, tingling, tremors, sensory change, speech change, focal weakness, seizures, loss of consciousness, weakness and headaches.        H/o TIA  X 2   Endo/Heme/Allergies:  Negative for environmental allergies and polydipsia. Does not bruise/bleed easily.   Psychiatric/Behavioral:  Negative for depression, hallucinations, memory loss, substance abuse and suicidal ideas. The patient is not nervous/anxious and does not have insomnia.      Physical Exam    Vitals:    01/30/23 0925   BP: 110/72   BP Location: Left arm   Patient Position: Sitting   BP Method: Medium (Manual)   Pulse: 75   SpO2: 98%   Weight: 63.1 kg (139 lb 3.2 oz)       Physical Exam  Vitals and nursing note reviewed.   Constitutional:       General: She  is not in acute distress.     Appearance: She is well-developed. She is not diaphoretic.   HENT:      Head: Normocephalic and atraumatic.      Right Ear: External ear normal.      Left Ear: External ear normal.      Nose: Nose normal.   Eyes:      Conjunctiva/sclera: Conjunctivae normal.      Pupils: Pupils are equal, round, and reactive to light.   Neck:      Thyroid: No thyromegaly.      Vascular: No JVD.      Trachea: No tracheal deviation.   Cardiovascular:      Rate and Rhythm: Normal rate and regular rhythm.      Heart sounds: Normal heart sounds. No murmur heard.    No friction rub. No gallop.   Pulmonary:      Effort: Pulmonary effort is normal. No respiratory distress.      Breath sounds: Normal breath sounds. No stridor. No wheezing or rales.   Chest:      Chest wall: No tenderness.   Abdominal:      General: Bowel sounds are normal. There is no distension.      Palpations: Abdomen is soft.      Tenderness: There is no abdominal tenderness.   Musculoskeletal:         General: No tenderness. Normal range of motion.      Cervical back: Normal range of motion and neck supple.   Lymphadenopathy:      Cervical: No cervical adenopathy.   Skin:     General: Skin is warm and dry.   Neurological:      Mental Status: She is alert and oriented to person, place, and time.      Cranial Nerves: No cranial nerve deficit.   Psychiatric:         Behavior: Behavior normal.       Labs    Lab Results   Component Value Date    WBC 3.87 (L) 10/10/2022    HGB 12.5 10/10/2022    HCT 35.6 (L) 10/10/2022     10/10/2022       Sodium   Date Value Ref Range Status   10/10/2022 144 136 - 145 mmol/L Final     Potassium   Date Value Ref Range Status   10/10/2022 3.6 3.5 - 5.1 mmol/L Final     Chloride   Date Value Ref Range Status   10/10/2022 105 95 - 110 mmol/L Final     CO2   Date Value Ref Range Status   10/10/2022 25 23 - 29 mmol/L Final     Glucose   Date Value Ref Range Status   10/10/2022 105 70 - 110 mg/dL Final     BUN    Date Value Ref Range Status   10/10/2022 13 6 - 20 mg/dL Final     Creatinine   Date Value Ref Range Status   10/10/2022 1.0 0.5 - 1.4 mg/dL Final     Calcium   Date Value Ref Range Status   10/10/2022 9.2 8.7 - 10.5 mg/dL Final     Total Protein   Date Value Ref Range Status   10/10/2022 7.1 6.0 - 8.4 g/dL Final     Albumin   Date Value Ref Range Status   10/10/2022 4.1 3.5 - 5.2 g/dL Final     Total Bilirubin   Date Value Ref Range Status   10/10/2022 0.5 0.1 - 1.0 mg/dL Final     Comment:     For infants and newborns, interpretation of results should be based  on gestational age, weight and in agreement with clinical  observations.    Premature Infant recommended reference ranges:  Up to 24 hours.............<8.0 mg/dL  Up to 48 hours............<12.0 mg/dL  3-5 days..................<15.0 mg/dL  6-29 days.................<15.0 mg/dL       Alkaline Phosphatase   Date Value Ref Range Status   10/10/2022 123 55 - 135 U/L Final     AST   Date Value Ref Range Status   10/10/2022 23 10 - 40 U/L Final     ALT   Date Value Ref Range Status   10/10/2022 37 10 - 44 U/L Final     Anion Gap   Date Value Ref Range Status   10/10/2022 14 8 - 16 mmol/L Final       Xrays        Impression/Plan    Problem List Items Addressed This Visit          Pulmonary    COPD (chronic obstructive pulmonary disease)  - will order PFT, walk test  - prn beta agonist and SPIRIVA  - RTC 6 months    H/o cigarette use  - quit 2014    H/o Covid  - aware              Victor Hugo Shah MD

## 2023-01-31 ENCOUNTER — OFFICE VISIT (OUTPATIENT)
Dept: FAMILY MEDICINE | Facility: CLINIC | Age: 58
End: 2023-01-31
Payer: MEDICARE

## 2023-01-31 VITALS
HEART RATE: 77 BPM | BODY MASS INDEX: 25.13 KG/M2 | RESPIRATION RATE: 15 BRPM | WEIGHT: 147.19 LBS | OXYGEN SATURATION: 97 % | DIASTOLIC BLOOD PRESSURE: 76 MMHG | HEIGHT: 64 IN | SYSTOLIC BLOOD PRESSURE: 124 MMHG

## 2023-01-31 DIAGNOSIS — S90.862D INSECT BITE OF LEFT FOOT, SUBSEQUENT ENCOUNTER: Primary | ICD-10-CM
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
## 2023-01-31 DIAGNOSIS — L97.521 NON-PRESSURE CHRONIC ULCER OF OTHER PART OF LEFT FOOT LIMITED TO BREAKDOWN OF SKIN: ICD-10-CM

## 2023-01-31 DIAGNOSIS — I73.9 PVD (PERIPHERAL VASCULAR DISEASE): ICD-10-CM

## 2023-01-31 PROCEDURE — 1159F MED LIST DOCD IN RCRD: CPT | Mod: CPTII,S$GLB,ICN,

## 2023-01-31 PROCEDURE — 3078F PR MOST RECENT DIASTOLIC BLOOD PRESSURE < 80 MM HG: ICD-10-PCS | Mod: CPTII,S$GLB,ICN,

## 2023-01-31 PROCEDURE — 3008F PR BODY MASS INDEX (BMI) DOCUMENTED: ICD-10-PCS | Mod: CPTII,S$GLB,ICN,

## 2023-01-31 PROCEDURE — 1159F PR MEDICATION LIST DOCUMENTED IN MEDICAL RECORD: ICD-10-PCS | Mod: CPTII,S$GLB,ICN,

## 2023-01-31 PROCEDURE — 99999 PR PBB SHADOW E&M-EST. PATIENT-LVL IV: CPT | Mod: PBBFAC,,,

## 2023-01-31 PROCEDURE — 3078F DIAST BP <80 MM HG: CPT | Mod: CPTII,S$GLB,ICN,

## 2023-01-31 PROCEDURE — 99214 OFFICE O/P EST MOD 30 MIN: CPT | Mod: PBBFAC,PN

## 2023-01-31 PROCEDURE — 3008F BODY MASS INDEX DOCD: CPT | Mod: CPTII,S$GLB,ICN,

## 2023-01-31 PROCEDURE — 3074F SYST BP LT 130 MM HG: CPT | Mod: CPTII,S$GLB,ICN,

## 2023-01-31 PROCEDURE — 3074F PR MOST RECENT SYSTOLIC BLOOD PRESSURE < 130 MM HG: ICD-10-PCS | Mod: CPTII,S$GLB,ICN,

## 2023-01-31 PROCEDURE — 99214 OFFICE O/P EST MOD 30 MIN: CPT | Mod: S$GLB,ICN,,

## 2023-01-31 PROCEDURE — 99214 PR OFFICE/OUTPT VISIT, EST, LEVL IV, 30-39 MIN: ICD-10-PCS | Mod: S$GLB,ICN,,

## 2023-01-31 PROCEDURE — 99999 PR PBB SHADOW E&M-EST. PATIENT-LVL IV: ICD-10-PCS | Mod: PBBFAC,,,

## 2023-01-31 RX ORDER — DOXYCYCLINE 100 MG/1
100 CAPSULE ORAL 2 TIMES DAILY
Qty: 20 CAPSULE | Refills: 0 | Status: SHIPPED | OUTPATIENT
Start: 2023-01-31

## 2023-01-31 RX ORDER — MUPIROCIN 20 MG/G
OINTMENT TOPICAL 3 TIMES DAILY
Qty: 30 G | Refills: 1 | Status: SHIPPED | OUTPATIENT
Start: 2023-01-31

## 2023-01-31 NOTE — PROGRESS NOTES
"[facility] - Clinic Visit Note    Subjective:           Chief Complaint: Follow-up (Bug bite on left foot)    Clara Dotson is a 57 y.o. female presenting to clinic today with concerns about a bite on her left foot that occurred on 1/9/2023 that is not healing. She went to urgent care was put on Doxycycline, has one pill left, has been using Bactroban ointment from previous encounter.         Review of Systems   Constitutional:  Negative for chills and fever.   HENT:  Negative for congestion.    Respiratory:  Negative for cough and shortness of breath.    Cardiovascular:  Negative for chest pain and leg swelling.   Gastrointestinal:  Negative for abdominal pain, constipation, diarrhea, nausea and vomiting.   Genitourinary:  Negative for difficulty urinating.   Skin:  Positive for wound.        Non healing insect bite   Neurological:  Negative for dizziness and headaches.   All other systems reviewed and are negative.        Objective:      /76 (BP Location: Left arm, Patient Position: Sitting, BP Method: Medium (Manual))   Pulse 77   Resp 15   Ht 5' 4" (1.626 m)   Wt 66.8 kg (147 lb 3.2 oz)   SpO2 97%   BMI 25.27 kg/m²   Physical Exam  Vitals and nursing note reviewed.   Constitutional:       Appearance: Normal appearance.   HENT:      Head: Normocephalic and atraumatic.      Nose: Nose normal.   Eyes:      Pupils: Pupils are equal, round, and reactive to light.   Cardiovascular:      Rate and Rhythm: Normal rate and regular rhythm.      Heart sounds: Normal heart sounds.   Pulmonary:      Effort: Pulmonary effort is normal.      Breath sounds: Normal breath sounds.   Abdominal:      General: Abdomen is flat.   Musculoskeletal:         General: Normal range of motion.      Cervical back: Normal range of motion.   Skin:     General: Skin is warm and dry.      Findings: Wound present.      Comments: Picture uploaded to media   Neurological:      Mental Status: She is alert and oriented to person, " place, and time.   Psychiatric:         Mood and Affect: Mood normal.         Behavior: Behavior normal.         Thought Content: Thought content normal.         Judgment: Judgment normal.       Assessment and Plan:       1. Insect bite of left foot, subsequent encounter  -     doxycycline (VIBRAMYCIN) 100 MG Cap; Take 1 capsule (100 mg total) by mouth 2 (two) times daily.  Dispense: 20 capsule; Refill: 0  -     mupirocin (BACTROBAN) 2 % ointment; Apply topically 3 (three) times daily.  Dispense: 30 g; Refill: 1    2. 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 PVD (peripheral vascular disease)  -     US Lower Extremity Venous Insufficiency Left; Future; Expected date: 01/31/2023        PLAN: Applies to all diagnosis and orders listed above.  - vascular u/s ordered on LLE, discussed vascular surgery consult depending on these results  - cont with one more round of doxycycline and abx ointment  - Follow up after u/s to discuss results.     Discussed with patient the plan of care. Medications reviewed. Medication side effects discussed. Patient has no questions or concerns at this time. Reviewed, monitored, evaluated, and assessed chronic conditions as outlined above. Reviewed family, medical, surgical, and social history. Reviewed and discussed labs and imaging from the last 3 months.  Informed patient to please notify me with any questions or concerns at anytime.    Thank you,  CIERRA Parisi  Family Medicine  [facility]

## 2023-02-01 DIAGNOSIS — Z11.59 NEED FOR HEPATITIS C SCREENING TEST: ICD-10-CM

## 2023-02-09 ENCOUNTER — HOSPITAL ENCOUNTER (OUTPATIENT)
Dept: RADIOLOGY | Facility: HOSPITAL | Age: 58
Discharge: HOME | End: 2023-02-09
Payer: MEDICARE

## 2023-02-09 DIAGNOSIS — L97.521 NON-PRESSURE CHRONIC ULCER OF OTHER PART OF LEFT FOOT LIMITED TO BREAKDOWN OF SKIN: ICD-10-CM

## 2023-02-09 DIAGNOSIS — I73.9 PVD (PERIPHERAL VASCULAR DISEASE): ICD-10-CM

## 2023-02-09 PROCEDURE — 93926 US ARTERIAL LOWER EXTREMITY LEFT WITH ABI (XPD): ICD-10-PCS | Mod: 26,LT,, | Performed by: RADIOLOGY

## 2023-02-09 PROCEDURE — 93926 LOWER EXTREMITY STUDY: CPT | Mod: TC,LT

## 2023-02-09 PROCEDURE — 93922 US ARTERIAL LOWER EXTREMITY LEFT WITH ABI (XPD): ICD-10-PCS | Mod: 26,52,, | Performed by: RADIOLOGY

## 2023-02-09 PROCEDURE — 93922 UPR/L XTREMITY ART 2 LEVELS: CPT | Mod: 26,52,, | Performed by: RADIOLOGY

## 2023-02-09 PROCEDURE — 93926 LOWER EXTREMITY STUDY: CPT | Mod: 26,LT,, | Performed by: RADIOLOGY

## 2023-02-16 ENCOUNTER — OFFICE VISIT (OUTPATIENT)
Dept: FAMILY MEDICINE | Facility: CLINIC | Age: 58
End: 2023-02-16
Payer: MEDICARE

## 2023-02-16 VITALS
HEART RATE: 64 BPM | HEIGHT: 64 IN | BODY MASS INDEX: 24.16 KG/M2 | OXYGEN SATURATION: 97 % | DIASTOLIC BLOOD PRESSURE: 78 MMHG | RESPIRATION RATE: 15 BRPM | SYSTOLIC BLOOD PRESSURE: 132 MMHG | WEIGHT: 141.5 LBS

## 2023-02-16 DIAGNOSIS — G62.9 NEUROPATHY: ICD-10-CM

## 2023-02-16 DIAGNOSIS — I73.9 PAD (PERIPHERAL ARTERY DISEASE): Primary | ICD-10-CM

## 2023-02-16 PROCEDURE — 3078F PR MOST RECENT DIASTOLIC BLOOD PRESSURE < 80 MM HG: ICD-10-PCS | Mod: CPTII,S$GLB,,

## 2023-02-16 PROCEDURE — 1159F PR MEDICATION LIST DOCUMENTED IN MEDICAL RECORD: ICD-10-PCS | Mod: CPTII,S$GLB,,

## 2023-02-16 PROCEDURE — 1159F MED LIST DOCD IN RCRD: CPT | Mod: CPTII,S$GLB,,

## 2023-02-16 PROCEDURE — 4010F PR ACE/ARB THEARPY RXD/TAKEN: ICD-10-PCS | Mod: CPTII,S$GLB,,

## 2023-02-16 PROCEDURE — 99999 PR PBB SHADOW E&M-EST. PATIENT-LVL IV: CPT | Mod: PBBFAC,,,

## 2023-02-16 PROCEDURE — 99213 OFFICE O/P EST LOW 20 MIN: CPT | Mod: S$GLB,,,

## 2023-02-16 PROCEDURE — 3075F SYST BP GE 130 - 139MM HG: CPT | Mod: CPTII,S$GLB,,

## 2023-02-16 PROCEDURE — 99213 PR OFFICE/OUTPT VISIT, EST, LEVL III, 20-29 MIN: ICD-10-PCS | Mod: S$GLB,,,

## 2023-02-16 PROCEDURE — 3008F PR BODY MASS INDEX (BMI) DOCUMENTED: ICD-10-PCS | Mod: CPTII,S$GLB,,

## 2023-02-16 PROCEDURE — 3078F DIAST BP <80 MM HG: CPT | Mod: CPTII,S$GLB,,

## 2023-02-16 PROCEDURE — 3008F BODY MASS INDEX DOCD: CPT | Mod: CPTII,S$GLB,,

## 2023-02-16 PROCEDURE — 99999 PR PBB SHADOW E&M-EST. PATIENT-LVL IV: ICD-10-PCS | Mod: PBBFAC,,,

## 2023-02-16 PROCEDURE — 4010F ACE/ARB THERAPY RXD/TAKEN: CPT | Mod: CPTII,S$GLB,,

## 2023-02-16 PROCEDURE — 3075F PR MOST RECENT SYSTOLIC BLOOD PRESS GE 130-139MM HG: ICD-10-PCS | Mod: CPTII,S$GLB,,

## 2023-02-16 RX ORDER — GABAPENTIN 400 MG/1
400 CAPSULE ORAL 3 TIMES DAILY
Qty: 270 CAPSULE | Refills: 3 | Status: SHIPPED | OUTPATIENT
Start: 2023-02-16 | End: 2024-02-16

## 2023-02-16 NOTE — PROGRESS NOTES
"[facility] - Clinic Visit Note    Subjective:           Chief Complaint: Follow-up (Bug bite/Us review/Declines vaccines)    Clara Dotson is a 57 y.o. female presenting to clinic today for f/u with non-healing bug bite. She has been on two courses of doxycycline and using abx ointment. She reports improvement with wound healing. Still experiencing severe leg cramps and pain in middle three toes of left foot.   US reviewed and discussed with moderate stenosis or occlusion, will need to be evaluated by vascular surgeon.       Review of Systems   Constitutional:  Negative for chills and fever.   HENT:  Negative for congestion.    Respiratory:  Negative for cough and shortness of breath.    Cardiovascular:  Negative for chest pain and leg swelling.   Gastrointestinal:  Negative for abdominal pain, constipation, diarrhea, nausea and vomiting.   Genitourinary:  Negative for difficulty urinating.   Musculoskeletal:         Left leg cramping and foot pain   Skin:  Positive for wound.        Non-healing wound left foot   Neurological:  Negative for dizziness and headaches.   All other systems reviewed and are negative.        Objective:      /78 (BP Location: Left arm, Patient Position: Sitting, BP Method: Medium (Manual))   Pulse 64   Resp 15   Ht 5' 4" (1.626 m)   Wt 64.2 kg (141 lb 8 oz)   SpO2 97%   BMI 24.29 kg/m²   Physical Exam  Vitals and nursing note reviewed.   Constitutional:       Appearance: Normal appearance.   HENT:      Head: Normocephalic and atraumatic.      Nose: Nose normal.   Eyes:      Pupils: Pupils are equal, round, and reactive to light.   Cardiovascular:      Rate and Rhythm: Normal rate and regular rhythm.      Heart sounds: Normal heart sounds.   Pulmonary:      Effort: Pulmonary effort is normal.      Breath sounds: Normal breath sounds.   Abdominal:      General: Abdomen is flat.   Musculoskeletal:         General: Normal range of motion.      Cervical back: Normal range of " motion.        Feet:    Feet:      Comments: Non-healing scabbed wound  Skin:     General: Skin is warm and dry.   Neurological:      Mental Status: She is alert and oriented to person, place, and time.   Psychiatric:         Mood and Affect: Mood normal.         Behavior: Behavior normal.         Thought Content: Thought content normal.         Judgment: Judgment normal.       Assessment and Plan:       1. PAD (peripheral artery disease)  -     gabapentin (NEURONTIN) 400 MG capsule; Take 1 capsule (400 mg total) by mouth 3 (three) times daily.  Dispense: 270 capsule; Refill: 3  -     Ambulatory referral/consult to Vascular Surgery; Future; Expected date: 02/23/2023    2. Neuropathy  -     gabapentin (NEURONTIN) 400 MG capsule; Take 1 capsule (400 mg total) by mouth 3 (three) times daily.  Dispense: 270 capsule; Refill: 3        PLAN: Applies to all diagnosis and orders listed above.  - vascular surgery referral, will print and give in hand  - print and give in hand results of ultrasound  - Follow up after f/u with vascular surgery.     Discussed with patient the plan of care. Medications reviewed. Medication side effects discussed. Patient has no questions or concerns at this time. Reviewed, monitored, evaluated, and assessed chronic conditions as outlined above. Reviewed family, medical, surgical, and social history. Reviewed and discussed labs and imaging from the last 3 months.  Informed patient to please notify me with any questions or concerns at anytime.    Thank you,  CIERRA Parisi  Family Medicine  [facility]

## 2023-04-03 DIAGNOSIS — C50.211 MALIGNANT NEOPLASM OF UPPER-INNER QUADRANT OF RIGHT BREAST, ESTROGEN RECEPTOR NEGATIVE: Primary | ICD-10-CM

## 2023-04-03 DIAGNOSIS — Z17.1 ESTROGEN RECEPTOR NEGATIVE TUMOR STATUS: ICD-10-CM

## 2023-04-03 DIAGNOSIS — Z17.1 MALIGNANT NEOPLASM OF UPPER-INNER QUADRANT OF RIGHT BREAST, ESTROGEN RECEPTOR NEGATIVE: Primary | ICD-10-CM

## 2023-04-27 ENCOUNTER — TELEPHONE (OUTPATIENT)
Dept: HEMATOLOGY/ONCOLOGY | Facility: CLINIC | Age: 58
End: 2023-04-27

## 2023-04-27 DIAGNOSIS — Z12.31 SCREENING MAMMOGRAM FOR HIGH-RISK PATIENT: Primary | ICD-10-CM

## 2023-04-27 NOTE — TELEPHONE ENCOUNTER
Per scheduling request, recs on last mammo and Heather's ok to do so as patient has been cancer free for at least 1 year, diagnostic mammo changed to screening mammo.

## 2023-05-16 ENCOUNTER — HOSPITAL ENCOUNTER (OUTPATIENT)
Dept: RADIOLOGY | Facility: HOSPITAL | Age: 58
Discharge: HOME | End: 2023-05-16
Attending: NURSE PRACTITIONER
Payer: MEDICARE

## 2023-05-16 ENCOUNTER — HOSPITAL ENCOUNTER (OUTPATIENT)
Dept: RADIOLOGY | Facility: HOSPITAL | Age: 58
Discharge: HOME | End: 2023-05-16
Attending: INTERNAL MEDICINE
Payer: MEDICARE

## 2023-05-16 DIAGNOSIS — M54.2 CERVICALGIA: Primary | ICD-10-CM

## 2023-05-16 DIAGNOSIS — C50.211 MALIGNANT NEOPLASM OF UPPER-INNER QUADRANT OF RIGHT BREAST IN FEMALE, ESTROGEN RECEPTOR NEGATIVE: ICD-10-CM

## 2023-05-16 DIAGNOSIS — Z17.1 MALIGNANT NEOPLASM OF UPPER-INNER QUADRANT OF RIGHT BREAST IN FEMALE, ESTROGEN RECEPTOR NEGATIVE: ICD-10-CM

## 2023-05-16 DIAGNOSIS — Z12.31 SCREENING MAMMOGRAM FOR HIGH-RISK PATIENT: ICD-10-CM

## 2023-05-16 DIAGNOSIS — Z17.1 ESTROGEN RECEPTOR NEGATIVE TUMOR STATUS: ICD-10-CM

## 2023-05-16 PROCEDURE — 71046 X-RAY EXAM CHEST 2 VIEWS: CPT | Mod: TC,PO

## 2023-05-16 PROCEDURE — 77067 SCR MAMMO BI INCL CAD: CPT | Mod: TC,PO

## 2023-05-17 ENCOUNTER — HOSPITAL ENCOUNTER (OUTPATIENT)
Dept: RADIOLOGY | Facility: HOSPITAL | Age: 58
Discharge: HOME | End: 2023-05-17
Attending: PAIN MEDICINE
Payer: MEDICARE

## 2023-05-17 DIAGNOSIS — M54.2 CERVICALGIA: ICD-10-CM

## 2023-05-17 PROCEDURE — 72141 MRI NECK SPINE W/O DYE: CPT | Mod: TC,PO

## 2023-05-31 DIAGNOSIS — E78.5 HYPERLIPIDEMIA, UNSPECIFIED HYPERLIPIDEMIA TYPE: ICD-10-CM

## 2023-05-31 DIAGNOSIS — I10 HYPERTENSION, UNSPECIFIED TYPE: ICD-10-CM

## 2023-05-31 RX ORDER — EZETIMIBE 10 MG/1
TABLET ORAL
Qty: 30 TABLET | Refills: 3 | OUTPATIENT
Start: 2023-05-31

## 2023-05-31 RX ORDER — ENALAPRIL MALEATE 5 MG/1
TABLET ORAL
Qty: 30 TABLET | Refills: 3 | OUTPATIENT
Start: 2023-05-31

## 2023-05-31 RX ORDER — ISOSORBIDE MONONITRATE 60 MG/1
TABLET, EXTENDED RELEASE ORAL
Qty: 30 TABLET | Refills: 3 | OUTPATIENT
Start: 2023-05-31

## 2023-05-31 NOTE — TELEPHONE ENCOUNTER
Care Due:                  Date            Visit Type   Department     Provider  --------------------------------------------------------------------------------    Last Visit: None Found      None         None Found  Next Visit: None Scheduled  None         None Found                                                            Last  Test          Frequency    Reason                     Performed    Due Date  --------------------------------------------------------------------------------    Office Visit  12 months..  enalapril, ezetimibe,      Not Found    Overdue                             isosorbide,                             nitroGLYCERIN, potassium.    Lipid Panel.  12 months..  ezetimibe................  Not Found    Overdue    [employer] Embedded Care Due Messages. Reference number: [id number].   5/31/2023 2:15:49 PM CDT

## 2023-06-01 RX ORDER — EZETIMIBE 10 MG/1
10 TABLET ORAL DAILY
Qty: 30 TABLET | Refills: 3 | Status: SHIPPED | OUTPATIENT
Start: 2023-06-01

## 2023-06-01 RX ORDER — ISOSORBIDE MONONITRATE 60 MG/1
60 TABLET, EXTENDED RELEASE ORAL DAILY
Qty: 30 TABLET | Refills: 3 | Status: SHIPPED | OUTPATIENT
Start: 2023-06-01

## 2023-06-01 NOTE — TELEPHONE ENCOUNTER
----- Message from Denise Luke sent at 6/1/2023 12:45 PM CDT -----  Contact: patient  Type:  RX Refill Request    Who Called: patient     Refill or New Rx: refill     RX Name and Strength:enalapril (VASOTEC) 5 MG tablet  ezetimibe (ZETIA) 10 mg tablet    isosorbide mononitrate (IMDUR) 60 MG 24 hr tablet      How is the patient currently taking it? (ex. 1XDay): once     Is this a 30 day or 90 day RX: 90    Preferred Pharmacy with phone number:     [facility] [place] - [address]  [address]  [place] [address]  Phone: [phone] Fax: [phone]    Local or Mail Order:local     Ordering Provider:    Would the patient rather a call back or a response via [facility]? Call     Best Call Back Number:[phone] (home)      Additional Information:

## 2023-06-08 ENCOUNTER — HOSPITAL ENCOUNTER (OUTPATIENT)
Dept: PULMONOLOGY | Facility: HOSPITAL | Age: 58
Discharge: HOME | End: 2023-06-08
Attending: INTERNAL MEDICINE
Payer: MEDICARE

## 2023-06-08 ENCOUNTER — HOSPITAL ENCOUNTER (OUTPATIENT)
Dept: RADIOLOGY | Facility: HOSPITAL | Age: 58
Discharge: HOME | End: 2023-06-08
Attending: INTERNAL MEDICINE
Payer: MEDICARE

## 2023-06-08 VITALS — BODY MASS INDEX: 24.07 KG/M2 | HEIGHT: 64 IN | WEIGHT: 141 LBS

## 2023-06-08 DIAGNOSIS — J44.9 CHRONIC OBSTRUCTIVE PULMONARY DISEASE, UNSPECIFIED COPD TYPE: ICD-10-CM

## 2023-06-08 PROCEDURE — 94010 BREATHING CAPACITY TEST: CPT

## 2023-06-08 PROCEDURE — 94729 DIFFUSING CAPACITY: CPT

## 2023-06-08 PROCEDURE — 71046 X-RAY EXAM CHEST 2 VIEWS: CPT | Mod: TC

## 2023-06-08 PROCEDURE — 94727 GAS DIL/WSHOT DETER LNG VOL: CPT

## 2023-06-08 PROCEDURE — 94618 PULMONARY STRESS TESTING: CPT

## 2023-06-08 NOTE — CARE UPDATE
"   06/08/23 1227   6MW Test   Ordering Provider Victor Hugo Shah MD   Diagnosis Shortness of Breath   Height 5' 4" (1.626 m)   Weight 64 kg (141 lb)   BMI (Calculated) 24.2   Predicted Distance Meters (Calculated) 522.6 meters   Patient Race    6MWT Status completed without stopping   Patient Reported Dyspnea   Was O2 used? No   6MW Distance walked (feet) 1400 feet   Distance walked (meters) 426.72 meters   Did patient stop? No   Type of assistive device(s) used? no assistive devices   Is extra documentation required for this patient? Yes   Pre-Exercise   Oxygen Saturation 96 %   Supplemental Oxygen Room Air   Heart Rate 86 bpm   Norma Dyspnea Rating  moderate   Post Exercise   Oxygen Saturation 94 %   Supplemental Oxygen Room Air   Heart Rate 110 bpm   Norma Dyspnea Rating  moderate   Recovery   Oxygen Saturation 97 %   Supplemental Oxygen Room Air   Heart Rate 98 bpm   Norma Dyspnea Rating  moderate   Interpretation   Is procedure ready for interpretation? Yes   Did the patient stop or pause? No   Total Laps Walked 7   Final Partial Lap Distance (feet) 0 feet   Total Distance Feet (Calculated) 1400 feet   Total Distance Meters (Calculated) 426.72 meters   Percentage of Predicted (Calculated) 81.65   Peak VO2 (Calculated) 16.78   Mets 4.79   Oxygen Qualification   Oxygen Qualification? No       "

## 2023-07-26 RX ORDER — ATORVASTATIN CALCIUM 40 MG/1
40 TABLET, FILM COATED ORAL
COMMUNITY
Start: 2023-04-08

## 2023-07-27 ENCOUNTER — OFFICE VISIT (OUTPATIENT)
Dept: PULMONOLOGY | Facility: CLINIC | Age: 58
End: 2023-07-27
Payer: MEDICARE

## 2023-07-27 VITALS
SYSTOLIC BLOOD PRESSURE: 118 MMHG | WEIGHT: 143 LBS | BODY MASS INDEX: 24.55 KG/M2 | OXYGEN SATURATION: 96 % | HEART RATE: 71 BPM | DIASTOLIC BLOOD PRESSURE: 70 MMHG

## 2023-07-27 DIAGNOSIS — Z87.891 PERSONAL HISTORY OF TOBACCO USE, PRESENTING HAZARDS TO HEALTH: ICD-10-CM

## 2023-07-27 DIAGNOSIS — J44.9 CHRONIC OBSTRUCTIVE PULMONARY DISEASE, UNSPECIFIED COPD TYPE: Primary | ICD-10-CM

## 2023-07-27 PROBLEM — J43.1 PANLOBULAR EMPHYSEMA: Status: RESOLVED | Noted: 2017-05-15 | Resolved: 2023-07-27

## 2023-07-27 PROCEDURE — 1159F MED LIST DOCD IN RCRD: CPT | Mod: CPTII,S$GLB,, | Performed by: INTERNAL MEDICINE

## 2023-07-27 PROCEDURE — 1159F PR MEDICATION LIST DOCUMENTED IN MEDICAL RECORD: ICD-10-PCS | Mod: CPTII,S$GLB,, | Performed by: INTERNAL MEDICINE

## 2023-07-27 PROCEDURE — 4010F PR ACE/ARB THEARPY RXD/TAKEN: ICD-10-PCS | Mod: CPTII,S$GLB,, | Performed by: INTERNAL MEDICINE

## 2023-07-27 PROCEDURE — 3074F SYST BP LT 130 MM HG: CPT | Mod: CPTII,S$GLB,, | Performed by: INTERNAL MEDICINE

## 2023-07-27 PROCEDURE — 3008F PR BODY MASS INDEX (BMI) DOCUMENTED: ICD-10-PCS | Mod: CPTII,S$GLB,, | Performed by: INTERNAL MEDICINE

## 2023-07-27 PROCEDURE — 3078F DIAST BP <80 MM HG: CPT | Mod: CPTII,S$GLB,, | Performed by: INTERNAL MEDICINE

## 2023-07-27 PROCEDURE — 3078F PR MOST RECENT DIASTOLIC BLOOD PRESSURE < 80 MM HG: ICD-10-PCS | Mod: CPTII,S$GLB,, | Performed by: INTERNAL MEDICINE

## 2023-07-27 PROCEDURE — 4010F ACE/ARB THERAPY RXD/TAKEN: CPT | Mod: CPTII,S$GLB,, | Performed by: INTERNAL MEDICINE

## 2023-07-27 PROCEDURE — 3008F BODY MASS INDEX DOCD: CPT | Mod: CPTII,S$GLB,, | Performed by: INTERNAL MEDICINE

## 2023-07-27 PROCEDURE — 3074F PR MOST RECENT SYSTOLIC BLOOD PRESSURE < 130 MM HG: ICD-10-PCS | Mod: CPTII,S$GLB,, | Performed by: INTERNAL MEDICINE

## 2023-07-27 PROCEDURE — 1160F RVW MEDS BY RX/DR IN RCRD: CPT | Mod: CPTII,S$GLB,, | Performed by: INTERNAL MEDICINE

## 2023-07-27 PROCEDURE — 99214 OFFICE O/P EST MOD 30 MIN: CPT | Mod: S$GLB,,, | Performed by: INTERNAL MEDICINE

## 2023-07-27 PROCEDURE — 1160F PR REVIEW ALL MEDS BY PRESCRIBER/CLIN PHARMACIST DOCUMENTED: ICD-10-PCS | Mod: CPTII,S$GLB,, | Performed by: INTERNAL MEDICINE

## 2023-07-27 PROCEDURE — 99214 PR OFFICE/OUTPT VISIT, EST, LEVL IV, 30-39 MIN: ICD-10-PCS | Mod: S$GLB,,, | Performed by: INTERNAL MEDICINE

## 2023-07-27 RX ORDER — PANTOPRAZOLE SODIUM 40 MG/1
40 TABLET, DELAYED RELEASE ORAL DAILY
Qty: 30 TABLET | Refills: 1 | Status: SHIPPED | OUTPATIENT
Start: 2023-07-27

## 2023-07-27 NOTE — PROGRESS NOTES
"Office Visit    Patient Name: Clara Dotson  MRN: [id number]  : 1965      Reason for visit: COPD    HPI:     10/2/2018 - Referred by Dr Neely for evaluation for possible COPD.  She states that she had PFT "years ago".  Has trouble with SOB, LYNCH.  Had PFT done at [facility] a few months ago.  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  She has been diagnosed with "fatty liver" and is to see GI MD.  Former smoker quit , smoked for about 15 years about 1 PPD.  Worked at [employer] ([id number]) did have exposure tyo chemicals and asbestos (gaskets).    12/10/2019 - Here for follow up about 1 year late, we never got PFT from [place].  Didn't feel that ANORO helped, does feel that rescue med helps.  No new issues reported.  Has been diagnosed with a hiatal hernia.    2023 - Here for follow up, had Covid a few years ago and was sick for about 14 days - not hospitalized.  Doing better from that and has noted some chronic HA since then.  Breathing has stable overall.Does have some issues when weather is humid.  Still with issues with her hiatal hernia and needs to heave another surgery.    2023 - PFT 23  No obstruction  No restriction  Mild decrease DLCO   No change since 2023 - Here for follow up, doing well we reviewed her PFT which was normal except for a mild reduction in DLCO.  No new symptoms or problems.  Does note some increased trouble in the current heat.    Past Medical History    Past Medical History:   Diagnosis Date    Anemia     Estrogen receptor negative status (ER-) 05/15/2017    Skin cancer     TIA (transient ischemic attack)        Past Surgical History    Past Surgical History:   Procedure Laterality Date    BILATERAL MASTECTOMY      BREAST RECONSTRUCTION      CHOLECYSTECTOMY      colon polyp removal      COLONOSCOPY N/A 2022    Procedure: COLONOSCOPY;  Surgeon: Orestes Alvarado MD;  Location: [facility];  Service: General;  " Laterality: N/A;    ESOPHAGEAL MANOMETRY WITH MEASUREMENT OF IMPEDANCE N/A 7/2/2020    Procedure: MANOMETRY, ESOPHAGUS, WITH IMPEDANCE MEASUREMENT;  Surgeon: Lane Hu MD;  [facility]  Service: Endoscopy;  Laterality: N/A;  COVID on 6/29/20 at [facility]    ESOPHAGOGASTRODUODENOSCOPY N/A 1/14/2020    Procedure: EGD (ESOPHAGOGASTRODUODENOSCOPY);  Surgeon: Bayron Adamson MD;  Location: [facility] ENDO;  Service: Endoscopy;  Laterality: N/A;    ESOPHAGOGASTRODUODENOSCOPY N/A 5/16/2022    Procedure: ESOPHAGOGASTRODUODENOSCOPY (EGD);  Surgeon: Orestes Alvarado MD;  Location: [facility];  Service: General;  Laterality: N/A;    HYSTERECTOMY      LIPOSUCTION      MASTECTOMY      melanoma surgery      NECK SURGERY      reconstructive breast       tummy tuck         Medications      Current Outpatient Medications:     albuterol (VENTOLIN HFA) 90 mcg/actuation inhaler, Inhale 2 puffs into the lungs every 6 (six) hours as needed for Wheezing. Rescue, Disp: 18 g, Rfl: 11    ascorbic acid, vitamin C, (VITAMIN C) 100 MG tablet, Take 100 mg by mouth once daily., Disp: , Rfl:     aspirin 325 MG tablet, Take 325 mg by mouth once daily., Disp: , Rfl:     atorvastatin (LIPITOR) 40 MG tablet, 40 mg., Disp: , Rfl:     doxepin (SINEQUAN) 10 MG capsule, Take 1-3 capsule by mouth every night as needed THANK YOU, Disp: , Rfl:     doxycycline (VIBRAMYCIN) 100 MG Cap, Take 1 capsule (100 mg total) by mouth 2 (two) times daily., Disp: 20 capsule, Rfl: 0    enalapril (VASOTEC) 5 MG tablet, TAKE ONE TABLET BY MOUTH EVERY DAY **THANK YOU**, Disp: 30 tablet, Rfl: 3    ezetimibe (ZETIA) 10 mg tablet, Take 1 tablet (10 mg total) by mouth once daily., Disp: 30 tablet, Rfl: 3    gabapentin (NEURONTIN) 400 MG capsule, Take 1 capsule (400 mg total) by mouth 3 (three) times daily., Disp: 270 capsule, Rfl: 3    hydrocodone-acetaminophen 10-325mg (NORCO)  mg Tab, every 6 (six) hours as needed. , Disp: , Rfl:     isosorbide  mononitrate (IMDUR) 60 MG 24 hr tablet, Take 1 tablet (60 mg total) by mouth once daily., Disp: 30 tablet, Rfl: 3    mupirocin (BACTROBAN) 2 % ointment, Apply topically 3 (three) times daily., Disp: 30 g, Rfl: 1    nitroGLYCERIN (NITROSTAT) 0.4 MG SL tablet, DISSOLVE ONE TABLET UNDER THE TONGUE FOR CHEST PAIN MAY REPEAT EVERY FIVE MINUTES FOR NO MORE THAN THREE DOSES **THANK YOU**, Disp: 25 tablet, Rfl: 11    ondansetron (ZOFRAN) 8 MG tablet, Take 1 tablet (8 mg total) by mouth every 8 (eight) hours as needed for Nausea., Disp: 30 tablet, Rfl: 0    potassium chloride SA (K-DUR,KLOR-CON) 20 MEQ tablet, Take 1 tablet (20 mEq total) by mouth 2 (two) times daily., Disp: 60 tablet, Rfl: 2    tiotropium (SPIRIVA WITH HANDIHALER) 18 mcg inhalation capsule, Inhale 1 capsule (18 mcg total) into the lungs once daily. Controller, Disp: 30 capsule, Rfl: 11    VENTOLIN HFA 90 mcg/actuation inhaler, inhale TWO puffs into lungs EVERY 6 HOURS AS NEEDED FOR WHEEZING THANK YOU, Disp: , Rfl: 6    vitamin D (VITAMIN D3) 1000 units Tab, Take 1,000 Units by mouth once daily., Disp: , Rfl:     ZINC ORAL, Take by mouth., Disp: , Rfl:     pantoprazole (PROTONIX) 40 MG tablet, Take 1 tablet (40 mg total) by mouth once daily. Take in the morning before breakfast.  Wait 30 minutes before eating or drinking anything, Disp: 30 tablet, Rfl: 11    Current Facility-Administered Medications:     cyanocobalamin injection 100 mcg, 100 mcg, Intramuscular, Q30 Days, Israel Neely MD, 1,000 mcg at 18 0945    Allergies    Review of patient's allergies indicates:   Allergen Reactions    Pcn [penicillins] Hives       SocHx    Social History     Tobacco Use   Smoking Status Former    Types: Cigarettes    Quit date:     Years since quittin.5   Smokeless Tobacco Never       Social History     Substance and Sexual Activity   Alcohol Use No       Drug Use - no  Occupation - retired, as above  Asbestos exposure - possible  Pets -  dog    FMHx    Family History   Problem Relation Age of Onset    Cancer Mother     Hypertension Mother     Diabetes Mother     Breast cancer Father     Cancer Father     Hypertension Father     Breast cancer Sister         two sisters with breast cancer         Review of Systems  Review of Systems   Constitutional:  Negative for chills, diaphoresis, fever, malaise/fatigue and weight loss.   HENT:  Positive for hearing loss. Negative for congestion, ear discharge, ear pain, nosebleeds, sinus pain, sore throat and tinnitus.    Eyes:  Positive for blurred vision. Negative for double vision, photophobia, pain, discharge and redness.   Respiratory:  Positive for shortness of breath. Negative for cough, hemoptysis, sputum production, wheezing and stridor.    Cardiovascular:  Negative for chest pain, palpitations, orthopnea, claudication, leg swelling and PND.        Had recent heart evaluation which was negative   Gastrointestinal:  Positive for heartburn. Negative for abdominal pain, blood in stool, constipation, diarrhea, melena, nausea and vomiting.        Polyps   Genitourinary:  Negative for dysuria, flank pain, frequency, hematuria and urgency.   Musculoskeletal:  Negative for back pain, falls, joint pain, myalgias and neck pain.   Skin:  Negative for itching and rash.   Neurological:  Negative for dizziness, tingling, tremors, sensory change, speech change, focal weakness, seizures, loss of consciousness, weakness and headaches.        H/o TIA  X 2   Endo/Heme/Allergies:  Negative for environmental allergies and polydipsia. Does not bruise/bleed easily.   Psychiatric/Behavioral:  Negative for depression, hallucinations, memory loss, substance abuse and suicidal ideas. The patient is not nervous/anxious and does not have insomnia.      Physical Exam    Vitals:    07/27/23 1131   BP: 118/70   BP Location: Left arm   Patient Position: Sitting   BP Method: Medium (Manual)   Pulse: 71   SpO2: 96%   Weight: 64.9 kg (143  lb)       Physical Exam  Vitals and nursing note reviewed.   Constitutional:       General: She is not in acute distress.     Appearance: She is well-developed. She is not diaphoretic.   HENT:      Head: Normocephalic and atraumatic.      Right Ear: External ear normal.      Left Ear: External ear normal.      Nose: Nose normal.   Eyes:      Conjunctiva/sclera: Conjunctivae normal.      Pupils: Pupils are equal, round, and reactive to light.   Neck:      Thyroid: No thyromegaly.      Vascular: No JVD.      Trachea: No tracheal deviation.   Cardiovascular:      Rate and Rhythm: Normal rate and regular rhythm.      Heart sounds: Normal heart sounds. No murmur heard.    No friction rub. No gallop.   Pulmonary:      Effort: Pulmonary effort is normal. No respiratory distress.      Breath sounds: Normal breath sounds. No stridor. No wheezing or rales.   Chest:      Chest wall: No tenderness.   Abdominal:      General: Bowel sounds are normal. There is no distension.      Palpations: Abdomen is soft.      Tenderness: There is no abdominal tenderness.   Musculoskeletal:         General: No tenderness. Normal range of motion.      Cervical back: Normal range of motion and neck supple.   Lymphadenopathy:      Cervical: No cervical adenopathy.   Skin:     General: Skin is warm and dry.   Neurological:      Mental Status: She is alert and oriented to person, place, and time.      Cranial Nerves: No cranial nerve deficit.   Psychiatric:         Behavior: Behavior normal.       Labs    Lab Results   Component Value Date    WBC 3.87 (L) 10/10/2022    HGB 12.5 10/10/2022    HCT 35.6 (L) 10/10/2022     10/10/2022       Sodium   Date Value Ref Range Status   10/10/2022 144 136 - 145 mmol/L Final     Potassium   Date Value Ref Range Status   10/10/2022 3.6 3.5 - 5.1 mmol/L Final     Chloride   Date Value Ref Range Status   10/10/2022 105 95 - 110 mmol/L Final     CO2   Date Value Ref Range Status   10/10/2022 25 23 - 29 mmol/L  Final     Glucose   Date Value Ref Range Status   10/10/2022 105 70 - 110 mg/dL Final     BUN   Date Value Ref Range Status   10/10/2022 13 6 - 20 mg/dL Final     Creatinine   Date Value Ref Range Status   10/10/2022 1.0 0.5 - 1.4 mg/dL Final     Calcium   Date Value Ref Range Status   10/10/2022 9.2 8.7 - 10.5 mg/dL Final     Total Protein   Date Value Ref Range Status   10/10/2022 7.1 6.0 - 8.4 g/dL Final     Albumin   Date Value Ref Range Status   10/10/2022 4.1 3.5 - 5.2 g/dL Final     Total Bilirubin   Date Value Ref Range Status   10/10/2022 0.5 0.1 - 1.0 mg/dL Final     Comment:     For infants and newborns, interpretation of results should be based  on gestational age, weight and in agreement with clinical  observations.    Premature Infant recommended reference ranges:  Up to 24 hours.............<8.0 mg/dL  Up to 48 hours............<12.0 mg/dL  3-5 days..................<15.0 mg/dL  6-29 days.................<15.0 mg/dL       Alkaline Phosphatase   Date Value Ref Range Status   10/10/2022 123 55 - 135 U/L Final     AST   Date Value Ref Range Status   10/10/2022 23 10 - 40 U/L Final     ALT   Date Value Ref Range Status   10/10/2022 37 10 - 44 U/L Final     Anion Gap   Date Value Ref Range Status   10/10/2022 14 8 - 16 mmol/L Final       Xrays        Impression/Plan    Problem List Items Addressed This Visit          Pulmonary    COPD (chronic obstructive pulmonary disease)  - no significant COPD by PFT  - prn beta agonist  - RTC 6 months    H/o cigarette use  - quit 2014    H/o Covid  - aware              Victor Hugo Shah MD

## 2023-08-01 ENCOUNTER — TELEPHONE (OUTPATIENT)
Dept: HEMATOLOGY/ONCOLOGY | Facility: CLINIC | Age: 58
End: 2023-08-01

## 2023-08-01 DIAGNOSIS — Z17.1 MALIGNANT NEOPLASM OF UPPER-INNER QUADRANT OF RIGHT BREAST IN FEMALE, ESTROGEN RECEPTOR NEGATIVE: Primary | ICD-10-CM

## 2023-08-01 DIAGNOSIS — C50.211 MALIGNANT NEOPLASM OF UPPER-INNER QUADRANT OF RIGHT BREAST IN FEMALE, ESTROGEN RECEPTOR NEGATIVE: Primary | ICD-10-CM

## 2023-08-01 NOTE — TELEPHONE ENCOUNTER
PT need lab lab work prior to appt scheduled for 8/8/23.  Per pt she will get her labs [facility]

## 2023-08-08 ENCOUNTER — LAB VISIT (OUTPATIENT)
Dept: LAB | Facility: HOSPITAL | Age: 58
End: 2023-08-08
Attending: INTERNAL MEDICINE
Payer: MEDICARE

## 2023-08-08 DIAGNOSIS — Z17.1 MALIGNANT NEOPLASM OF UPPER-INNER QUADRANT OF RIGHT BREAST IN FEMALE, ESTROGEN RECEPTOR NEGATIVE: ICD-10-CM

## 2023-08-08 DIAGNOSIS — C50.211 MALIGNANT NEOPLASM OF UPPER-INNER QUADRANT OF RIGHT BREAST IN FEMALE, ESTROGEN RECEPTOR NEGATIVE: ICD-10-CM

## 2023-08-08 LAB
ALBUMIN SERPL BCP-MCNC: 4 G/DL (ref 3.5–5.2)
ALP SERPL-CCNC: 98 U/L (ref 55–135)
ALT SERPL W/O P-5'-P-CCNC: 40 U/L (ref 10–44)
ANION GAP SERPL CALC-SCNC: 8 MMOL/L (ref 8–16)
AST SERPL-CCNC: 30 U/L (ref 10–40)
BASOPHILS # BLD AUTO: 0.04 K/UL (ref 0–0.2)
BASOPHILS NFR BLD: 0.7 % (ref 0–1.9)
BILIRUB SERPL-MCNC: 0.4 MG/DL (ref 0.1–1)
BUN SERPL-MCNC: 15 MG/DL (ref 6–20)
CALCIUM SERPL-MCNC: 8.7 MG/DL (ref 8.7–10.5)
CHLORIDE SERPL-SCNC: 105 MMOL/L (ref 95–110)
CO2 SERPL-SCNC: 25 MMOL/L (ref 23–29)
CREAT SERPL-MCNC: 1 MG/DL (ref 0.5–1.4)
DIFFERENTIAL METHOD: ABNORMAL
EOSINOPHIL # BLD AUTO: 0.2 K/UL (ref 0–0.5)
EOSINOPHIL NFR BLD: 2.7 % (ref 0–8)
ERYTHROCYTE [DISTWIDTH] IN BLOOD BY AUTOMATED COUNT: 12.6 % (ref 11.5–14.5)
EST. GFR  (NO RACE VARIABLE): >60 ML/MIN/1.73 M^2
GLUCOSE SERPL-MCNC: 89 MG/DL (ref 70–110)
HCT VFR BLD AUTO: 36.1 % (ref 37–48.5)
HGB BLD-MCNC: 12.4 G/DL (ref 12–16)
IMM GRANULOCYTES # BLD AUTO: 0.02 K/UL (ref 0–0.04)
IMM GRANULOCYTES NFR BLD AUTO: 0.3 % (ref 0–0.5)
LYMPHOCYTES # BLD AUTO: 2.1 K/UL (ref 1–4.8)
LYMPHOCYTES NFR BLD: 35 % (ref 18–48)
MCH RBC QN AUTO: 29.6 PG (ref 27–31)
MCHC RBC AUTO-ENTMCNC: 34.3 G/DL (ref 32–36)
MCV RBC AUTO: 86 FL (ref 82–98)
MONOCYTES # BLD AUTO: 0.5 K/UL (ref 0.3–1)
MONOCYTES NFR BLD: 8.7 % (ref 4–15)
NEUTROPHILS # BLD AUTO: 3.1 K/UL (ref 1.8–7.7)
NEUTROPHILS NFR BLD: 52.6 % (ref 38–73)
NRBC BLD-RTO: 0 /100 WBC
PLATELET # BLD AUTO: 219 K/UL (ref 150–450)
PMV BLD AUTO: 9.8 FL (ref 9.2–12.9)
POTASSIUM SERPL-SCNC: 3.8 MMOL/L (ref 3.5–5.1)
PROT SERPL-MCNC: 6.8 G/DL (ref 6–8.4)
RBC # BLD AUTO: 4.19 M/UL (ref 4–5.4)
SODIUM SERPL-SCNC: 138 MMOL/L (ref 136–145)
WBC # BLD AUTO: 5.86 K/UL (ref 3.9–12.7)

## 2023-08-08 PROCEDURE — 80053 COMPREHEN METABOLIC PANEL: CPT | Performed by: INTERNAL MEDICINE

## 2023-08-08 PROCEDURE — 36415 COLL VENOUS BLD VENIPUNCTURE: CPT | Performed by: INTERNAL MEDICINE

## 2023-08-08 PROCEDURE — 85025 COMPLETE CBC W/AUTO DIFF WBC: CPT | Performed by: INTERNAL MEDICINE

## 2023-08-15 ENCOUNTER — TELEPHONE (OUTPATIENT)
Dept: FAMILY MEDICINE | Facility: CLINIC | Age: 58
End: 2023-08-15
Payer: MEDICARE

## 2023-08-15 ENCOUNTER — PATIENT MESSAGE (OUTPATIENT)
Dept: FAMILY MEDICINE | Facility: CLINIC | Age: 58
End: 2023-08-15
Payer: MEDICARE

## 2023-08-15 NOTE — TELEPHONE ENCOUNTER
----- Message from Quin Combs sent at 8/15/2023 11:28 AM CDT -----  Contact: pt [phone]  Type:  Sooner Appointment Request    Caller is requesting a sooner appointment.  Caller declined first available appointment listed below.  Caller will not accept being placed on the waitlist and is requesting a message be sent to doctor.    Name of Caller:  Sharon Brambila   When is the first available appointment?  Sept 7  Symptoms:  Hospital f/u   Best Call Back Number:  [phone]    Additional Information:  Pls call back and advise

## 2023-10-11 NOTE — PROGRESS NOTES
[facility] Hematology/Oncology  PROGRESS NOTE - Follow-up Visit      Subjective:       Patient ID:   NAME: Clara Dotson : 1965     58 y.o. female    Referring Doc: Dave  Other Physicians: Lucila (new card), Juan Diego (PCP); Kalina (Neuro); Bhaskar    Chief Complaint:  breast ca  f/u    History of Present Illness:     Patient is being seen today in person.The patient is her by herself. She last showed for oncology clinic appointment back in May 2022    She has issues with hernia and is followed by Dr Acosta    She saw her PCP Dr Adamson recently and she saw Dr Shah on 2023    She has chronic back issues which are stable.     She previously had had EGD and colonoscopy on 2022 with Dr Orestes Alvarado with GI at [facility] and had two polyps removed.       She is otherwise doing ok.  She denies any current CP, SOB,HA's or N/V. Breathing stable.       She had last mammogram in May 2023 and CXR in 2023    She has been seeing Dr Acosta and he has been monitoring the breast clip.     I discussed COVID19 precautions - she has not been vaccinated      ROS:   GEN: normal without any fever, night sweats or weight loss  HEENT: normal with no HA's, sore throat, stiff neck, changes in vision; hearing issues  CV: normal with no CP, SOB, PND, LYNCH or orthopnea  PULM: chronic stable SOB  GI: normal with no abdominal pain, nausea, vomiting, constipation, diarrhea, melanotic stools,  BRBPR, or hematemesis;    : normal with no hematuria, dysuria  BREAST: small nodule/clip on right breast stable  SKIN: normal with no rash, erythema, bruising, or swelling    Allergies:  Review of patient's allergies indicates:   Allergen Reactions    Pcn [penicillins] Hives       Medications:    Current Outpatient Medications:     ascorbic acid, vitamin C, (VITAMIN C) 100 MG tablet, Take 100 mg by mouth once daily., Disp: , Rfl:     aspirin 325 MG tablet, Take 325 mg by mouth once daily., Disp: , Rfl:     atorvastatin (LIPITOR) 40 MG  tablet, 40 mg., Disp: , Rfl:     doxepin (SINEQUAN) 10 MG capsule, Take 1-3 capsule by mouth every night as needed THANK YOU, Disp: , Rfl:     doxycycline (VIBRAMYCIN) 100 MG Cap, Take 1 capsule (100 mg total) by mouth 2 (two) times daily., Disp: 20 capsule, Rfl: 0    enalapril (VASOTEC) 5 MG tablet, TAKE ONE TABLET BY MOUTH EVERY DAY **THANK YOU**, Disp: 30 tablet, Rfl: 3    gabapentin (NEURONTIN) 400 MG capsule, Take 1 capsule (400 mg total) by mouth 3 (three) times daily., Disp: 270 capsule, Rfl: 3    hydrocodone-acetaminophen 10-325mg (NORCO)  mg Tab, every 6 (six) hours as needed. , Disp: , Rfl:     isosorbide mononitrate (IMDUR) 60 MG 24 hr tablet, Take 1 tablet (60 mg total) by mouth once daily., Disp: 30 tablet, Rfl: 3    mupirocin (BACTROBAN) 2 % ointment, Apply topically 3 (three) times daily., Disp: 30 g, Rfl: 1    nitroGLYCERIN (NITROSTAT) 0.4 MG SL tablet, DISSOLVE ONE TABLET UNDER THE TONGUE FOR CHEST PAIN MAY REPEAT EVERY FIVE MINUTES FOR NO MORE THAN THREE DOSES **THANK YOU**, Disp: 25 tablet, Rfl: 11    ondansetron (ZOFRAN) 8 MG tablet, Take 1 tablet (8 mg total) by mouth every 8 (eight) hours as needed for Nausea., Disp: 30 tablet, Rfl: 0    pantoprazole (PROTONIX) 40 MG tablet, Take 1 tablet (40 mg total) by mouth once daily. Take in the morning before breakfast.  Wait 30 minutes before eating or drinking anything, Disp: 30 tablet, Rfl: 1    potassium chloride SA (K-DUR,KLOR-CON) 20 MEQ tablet, Take 1 tablet (20 mEq total) by mouth 2 (two) times daily., Disp: 60 tablet, Rfl: 2    tiotropium (SPIRIVA WITH HANDIHALER) 18 mcg inhalation capsule, Inhale 1 capsule (18 mcg total) into the lungs once daily. Controller, Disp: 30 capsule, Rfl: 11    VENTOLIN HFA 90 mcg/actuation inhaler, inhale TWO puffs into lungs EVERY 6 HOURS AS NEEDED FOR WHEEZING THANK YOU, Disp: , Rfl: 6    vitamin D (VITAMIN D3) 1000 units Tab, Take 1,000 Units by mouth once daily., Disp: , Rfl:     ZINC ORAL, Take by mouth.,  "Disp: , Rfl:     ezetimibe (ZETIA) 10 mg tablet, Take 1 tablet (10 mg total) by mouth once daily. (Patient not taking: Reported on 10/12/2023), Disp: 30 tablet, Rfl: 3    Current Facility-Administered Medications:     cyanocobalamin injection 100 mcg, 100 mcg, Intramuscular, Q30 Days, Israel Neely MD, 1,000 mcg at 06/02/18 0945    PMHx/PSHx Updates:  See patient's last visit with me on  10/6/2021  See H&P in Vol #1        Pathology:  See Vol #1    EGD and colonoscopy 5/16/2022:    Final Pathologic Diagnosis 1. Duodenum, biopsy:   - Duodenal mucosa without significant histopathologic alteration   - Negative for active duodenitis or celiac-like injury   - Negative for dysplasia or malignancy   2. Stomach, antrum,biopsy:   - Mild chronic inactive gastritis   - Negative for Helicobacter pylori on H&E stain and immunostain   - Negative for intestinal metaplasia, dysplasia or malignancy   NOTE: Positive control and internal negative control for Helicobacter pylori   immunostain were examined and were appropriate.   3. Stomach, polypectomy:   - Gastric hyperplastic polyp   - Negative for Helicobacter pylori on H&E stain   - Negative for intestinal metaplasia, dysplasia or malignancy   4. Stomach, biopsies:   - Gastric fundic-type mucosa without significant histopathologic alteration   - Negative for Helicobacter pylori on H&E stain   - Negative for intestinal metaplasia, dysplasia or malignancy   5. Ano-rectal verge, polypectomies:   - Hyperplastic polyp, multiple fragments   - Negative for dysplasia or malignancy            Objective:     Vitals:  Blood pressure 137/73, pulse 77, temperature 97.6 °F (36.4 °C), resp. rate 18, height 5' 4" (1.626 m), weight 63.4 kg (139 lb 12.8 oz).        Physical Examination:   GEN: no apparent distress, comfortable; AAOx3  HEAD: atraumatic and normocephalic  EYES: no pallor, no icterus, PERRLA  ENT: OMM, no pharyngeal erythema, external ears WNL; no nasal discharge; no " thrush  NECK: no masses, thyroid normal, trachea midline, no LAD/LN's, supple  CV: RRR with no murmur; normal pulse; normal S1 and S2; no pedal edema  CHEST: Normal respiratory effort; CTAB; normal breath sounds; no wheeze or crackles  ABDOM: nontender and nondistended; soft; normal bowel sounds; no rebound/guarding; hernia  MUSC/Skeletal: ROM normal; no crepitus; joints normal; no deformities or arthropathy  EXTREM: no clubbing, cyanosis, inflammation or swelling  SKIN: no rashes, lesions, ulcers, petechiae or subcutaneous nodules; tattoos; some actinic keratosis on chest and face  : no keen  NEURO: grossly intact; motor/sensory WNL; AAOx3; no tremors  PSYCH: normal mood, affect and behavior  LYMPH: normal cervical, supraclavicular, axillary and groin LN's  Breast: small nodule/clip on right breast stable; bilateral mastectomies          Labs:      Lab Results   Component Value Date    WBC 5.86 08/08/2023    HGB 12.4 08/08/2023    HCT 36.1 (L) 08/08/2023    MCV 86 08/08/2023     08/08/2023          BMP  Lab Results   Component Value Date     08/08/2023    K 3.8 08/08/2023     08/08/2023    CO2 25 08/08/2023    BUN 15 08/08/2023    CREATININE 1.0 08/08/2023    CALCIUM 8.7 08/08/2023    ANIONGAP 8 08/08/2023    ESTGFRAFRICA >60.0 05/13/2022    EGFRNONAA >60.0 05/13/2022     Lab Results   Component Value Date    ALT 40 08/08/2023    AST 30 08/08/2023    ALKPHOS 98 08/08/2023    BILITOT 0.4 08/08/2023         Radiology/Diagnostic Studies:    Mammo 5/16/2023:    Assessment    Overall   1 - Negative         CXR  6/8/2023:    IMPRESSION:  1. No acute radiographic abnormalities or detrimental changes when compared to May 2023    Mammo 4/28/2022:    Impression:     1. No mammographic evidence of malignancy and no detrimental change.  2. Yearly mammography is recommended.  BI-RADS CATEGORY 1: NEGATIVE.       CXR 4/29/2022:  FINDINGS:  Patient rotated to the RPO position.  Mild chronic interstitial  change.  No focal consolidation.     Heart size top normal.  Mediastinal contours unremarkable.  Trachea slightly rotated to the right.     Bony thorax intact.      Mammo 4/21/2021:    Impression:     No mammographic evidence of malignancy     ASSESSMENT  ACR BI-RADS Category 1  Negative     RECOMMENDATION:     Annual screening mammography is recommended.    Mammo and bilat US 4/14/2020:    FINDINGS:  Breast Density: Amost entirely fat.     No suspicious mass, microcalcification, or architectural distortion.     Bilateral breast ultrasound     Sonographic evaluation of the palpable area concern in the 3 o'clock position the right breast shows no discrete solid or cystic mass.     Sonographic evaluation of the 10 o'clock position left breast shows no discrete solid or cystic mass.     Impression:     Bilateral mastectomies with tram reconstruction.  There is no mammographic or sonographic abnormality in either breast.     Recommendation: Annual screening mammography.     BI-RADS CATEGORY: 1  NEGATIVE        CXR 4/14/2020:    Impression       No acute pulmonary process             CT abdom/pelvis  1/31/2020:    Impression       Mild wall thickening in the right and transverse colon and in the distal small bowel, suggesting an enteritis/colitis.  Inflammatory bowel disease is a differential consideration.    No abscess, perforation, obstruction.    No evidence of metastatic malignancy.             PET/CT Fusion 6/4/2018:  IMPRESSION:    1. Negative for recurrent malignancy or metastatic disease.    2. Development of moderate hepatic steatosis.        CXR 5/31/2017 negative    I have reviewed all available lab results and radiology reports.    Assessment/Plan:     (1) 57 y.o. female with diagnosis of right breast cancer  - diagnosed in 2004  - Stage II  - s/p bilateral mastectomies  - followed by Dr Acosta with Gen Surgery  - s/p AC x4 and XRT  - triple negative  - CT scans in 3/2016  - PET on 6/4/2018 - negative for  recurrent cancer  - repeat mammogram and US on 4/14/2020 negative and CXR was normal as well  - will refer her to see Dr Acosta for the area she has a concern for on the breast    1/21/2021:  - she has been seeing Dr Acosta about the breast nodule and he is continuing to observe for now  - mammo due again in April 2021    10/6/2021:  - doing ok overall  - followed closely by dr Acosta  - next mammo due 4/22/2022 5/26/2022:  - mammo and CXR on 4/28/2022 appear to be stable  - repeat mammo in one year  - follow-up with Dr acosta as directed    10/12/2023:  - followed by Dr Acosta and plans to see him in near future about having hernia surgery  - mammo in May 2023 negative  - CXR June 2023 stable        (2) hx/of TIA's and PAD  - followed by Dr Whitlock with cardiology and now by Dr Gaytan     (3) COPD and former smoker  - followed by Dr Wolff with pulm in past  - she is on two separate inhalers     (4) Right inferior auricular biopsy in Nov 2013 - atypical single unit junctional melanocytic hyperplasia with no evidence of malignancy  - Dr Acosta following       (5) Arthritic issues    (6) Skin cancer issues - followed by Dr Manley (Derm)    (7) Lower extremity issues - she is seeing Dr Gilman/René with Neurology at [facility] and she had a nerve conduction study and she plans to see ortho in future    (8) Chronic pain syndrome - followed by Pain management - Dr Barajas    (9) Chronic dysphagia and hx/of hepatic steatosis - s/p prior colonoscopy with Dr Muro  - she plans to see GI with [facility] in [place] in near future    10/6/2021:  - she still has not followed up with GI for repeat endoscopies - I encouraged her to do so as soon as she can    5/26/2022:  - s/p recent EGD and colonoscopy with Dr Orestes Alvarado        (10) Hearing issues - she plans to see Dr Barajas with ENT in near future in [facility]    (11) Hx/of covid in Sept/oct 2021         1. Malignant neoplasm of upper-inner quadrant of right breast  in female, estrogen receptor negative        2. Estrogen receptor negative tumor status        3. Personal history of tobacco use, presenting hazards to health                PLAN:  1. F/u with PCP, card, pulm, dermatology, neuro and Gen Surg  2. Check up to date labs as directed  3. F/u with GI as directed  4. F/u with Dr Acosta ; repeat mammo in May 2024  5. Refer to dermatology   RTC in 12 months    Fax note to Chai Adamson (PCP), Bijan Acosta [place], Mishra; New/Alyssa, Bhaskar/Christiano        Total Time spent on patient:    I spent over 15 mins of time with the patient. Reviewed results of the recently ordered labs, tests, reports and studies; made directives with regards to the results. Over half of this time was spent couseling and coordinating care, making treatment and analytical decisions; ordering necessary labs, tests and studies; and discussing treatment options and setting up treatment plan(s) if indicated.      Discussion:       COVID-19 Discussion:    I had long discussion with patient and any applicable family about the COVID-19 coronavirus epidemic and the recommended precautions with regard to cancer and/or hematology patients. I have re-iterated the CDC recommendations for adequate hand washing, use of hand -like products, and coughing into elbow, etc. In addition, especially for our patients who are on chemotherapy and/or our otherwise immunocompromised patients, I have recommended avoidance of crowds, including movie theaters, restaurants, churches, etc. I have recommended avoidance of any sick or symptomatic family members and/or friends. I have also recommended avoidance of any raw and unwashed food products, and general avoidance of food items that have not been prepared by themselves. The patient has been asked to call us immediately with any symptom developments, issues, questions or other general concerns.          I have explained all of the above in detail and  the patient understands all of the current recommendation(s). I have answered all of their questions to the best of my ability and to their complete satisfaction.   The patient is to continue with the current management plan.            Electronically signed by Israel Neely MD

## 2023-10-12 ENCOUNTER — OFFICE VISIT (OUTPATIENT)
Dept: HEMATOLOGY/ONCOLOGY | Facility: CLINIC | Age: 58
End: 2023-10-12
Payer: MEDICARE

## 2023-10-12 VITALS
TEMPERATURE: 98 F | RESPIRATION RATE: 18 BRPM | SYSTOLIC BLOOD PRESSURE: 137 MMHG | HEIGHT: 64 IN | DIASTOLIC BLOOD PRESSURE: 73 MMHG | HEART RATE: 77 BPM | WEIGHT: 139.81 LBS | BODY MASS INDEX: 23.87 KG/M2

## 2023-10-12 DIAGNOSIS — Z87.891 PERSONAL HISTORY OF TOBACCO USE, PRESENTING HAZARDS TO HEALTH: ICD-10-CM

## 2023-10-12 DIAGNOSIS — Z17.1 MALIGNANT NEOPLASM OF UPPER-INNER QUADRANT OF RIGHT BREAST IN FEMALE, ESTROGEN RECEPTOR NEGATIVE: Primary | ICD-10-CM

## 2023-10-12 DIAGNOSIS — Z17.1 ESTROGEN RECEPTOR NEGATIVE TUMOR STATUS: ICD-10-CM

## 2023-10-12 DIAGNOSIS — C50.211 MALIGNANT NEOPLASM OF UPPER-INNER QUADRANT OF RIGHT BREAST IN FEMALE, ESTROGEN RECEPTOR NEGATIVE: Primary | ICD-10-CM

## 2023-10-12 PROCEDURE — 4010F ACE/ARB THERAPY RXD/TAKEN: CPT | Mod: CPTII,S$GLB,, | Performed by: INTERNAL MEDICINE

## 2023-10-12 PROCEDURE — 3008F BODY MASS INDEX DOCD: CPT | Mod: CPTII,S$GLB,, | Performed by: INTERNAL MEDICINE

## 2023-10-12 PROCEDURE — 3078F PR MOST RECENT DIASTOLIC BLOOD PRESSURE < 80 MM HG: ICD-10-PCS | Mod: CPTII,S$GLB,, | Performed by: INTERNAL MEDICINE

## 2023-10-12 PROCEDURE — 1159F MED LIST DOCD IN RCRD: CPT | Mod: CPTII,S$GLB,, | Performed by: INTERNAL MEDICINE

## 2023-10-12 PROCEDURE — 3075F PR MOST RECENT SYSTOLIC BLOOD PRESS GE 130-139MM HG: ICD-10-PCS | Mod: CPTII,S$GLB,, | Performed by: INTERNAL MEDICINE

## 2023-10-12 PROCEDURE — 99213 OFFICE O/P EST LOW 20 MIN: CPT | Mod: S$GLB,,, | Performed by: INTERNAL MEDICINE

## 2023-10-12 PROCEDURE — 1159F PR MEDICATION LIST DOCUMENTED IN MEDICAL RECORD: ICD-10-PCS | Mod: CPTII,S$GLB,, | Performed by: INTERNAL MEDICINE

## 2023-10-12 PROCEDURE — 1160F PR REVIEW ALL MEDS BY PRESCRIBER/CLIN PHARMACIST DOCUMENTED: ICD-10-PCS | Mod: CPTII,S$GLB,, | Performed by: INTERNAL MEDICINE

## 2023-10-12 PROCEDURE — 99213 PR OFFICE/OUTPT VISIT, EST, LEVL III, 20-29 MIN: ICD-10-PCS | Mod: S$GLB,,, | Performed by: INTERNAL MEDICINE

## 2023-10-12 PROCEDURE — 4010F PR ACE/ARB THEARPY RXD/TAKEN: ICD-10-PCS | Mod: CPTII,S$GLB,, | Performed by: INTERNAL MEDICINE

## 2023-10-12 PROCEDURE — 3008F PR BODY MASS INDEX (BMI) DOCUMENTED: ICD-10-PCS | Mod: CPTII,S$GLB,, | Performed by: INTERNAL MEDICINE

## 2023-10-12 PROCEDURE — 3075F SYST BP GE 130 - 139MM HG: CPT | Mod: CPTII,S$GLB,, | Performed by: INTERNAL MEDICINE

## 2023-10-12 PROCEDURE — 1160F RVW MEDS BY RX/DR IN RCRD: CPT | Mod: CPTII,S$GLB,, | Performed by: INTERNAL MEDICINE

## 2023-10-12 PROCEDURE — 3078F DIAST BP <80 MM HG: CPT | Mod: CPTII,S$GLB,, | Performed by: INTERNAL MEDICINE

## 2023-12-26 DIAGNOSIS — J44.9 CHRONIC OBSTRUCTIVE PULMONARY DISEASE, UNSPECIFIED COPD TYPE: Primary | ICD-10-CM

## 2023-12-27 RX ORDER — ALBUTEROL SULFATE 90 UG/1
2 AEROSOL, METERED RESPIRATORY (INHALATION) EVERY 6 HOURS PRN
Qty: 18 G | Refills: 11 | Status: SHIPPED | OUTPATIENT
Start: 2023-12-27 | End: 2024-12-26

## 2024-01-09 ENCOUNTER — TELEPHONE (OUTPATIENT)
Dept: HEMATOLOGY/ONCOLOGY | Facility: CLINIC | Age: 59
End: 2024-01-09

## 2024-01-09 NOTE — TELEPHONE ENCOUNTER
Attempted to call patient to reiterate that she should call her PCP concerning jaw pain she is having as this was my rec when I spoke to her last week and Heather is in agreement with this rec.

## 2024-01-09 NOTE — TELEPHONE ENCOUNTER
----- Message from Heather Valenzuela NP sent at 1/8/2024  8:21 AM CST -----  Lets go ahead and get her to PCP for eval.     ----- Message -----  From: Sienna Blake RN  Sent: 1/5/2024   3:57 PM CST  To: Heather Valenzuela NP    I spoke to patient, she had these moles removed about 5 years ago by Dr. Acosta. I will have randolph request the results of that path from their office. Patient said Dr. Acosta no longer takes her insurance so she is not able to call them about the pain she is having. Any other instructions I should give her at this time?     Kell can you please request path report from Dr. Acosta from about 5 years ago on the jaw moles that were removed for review   ----- Message -----  From: Heather Valenzuela NP  Sent: 1/5/2024  11:50 AM CST  To: Sienna Blake RN    She needs to follow with surgeon about jaw pain.   We can do labs, however there is no lab that will give us information for what she has going on.   We will need path from the surgeon to review.     ----- Message -----  From: Candis Garcia  Sent: 12/28/2023   4:38 PM CST  To: Chin Wood Staff    Pt is calling to let us know that she got some moles removed from her jaw. Moles came back malignant and was told that the dr got everything out. However, It has been 6 or 7 days and jaw pain has her really concerned. She said that Dr. Bennett in [facility], or maybe He is at [facility]. Would Dr. Neely like for her to have some labs done? She likes to use [facility]    [phone]

## 2024-01-10 ENCOUNTER — TELEPHONE (OUTPATIENT)
Dept: HEMATOLOGY/ONCOLOGY | Facility: CLINIC | Age: 59
End: 2024-01-10

## 2024-01-10 NOTE — TELEPHONE ENCOUNTER
----- Message from Candis Garcia sent at 1/9/2024  3:38 PM CST -----  migdalia El returning your call    Cb [phone]

## 2024-01-10 NOTE — TELEPHONE ENCOUNTER
Attempted to return call to again make patient aware that she should f/u with PCP concerning jaw pain as this was the only recs that both Dr. Neely and Heather had concerning this at this time. No answer, no VM available.    Quality 130: Documentation Of Current Medications In The Medical Record: Current Medications Documented Quality 110: Preventive Care And Screening: Influenza Immunization: Influenza immunization was not ordered or administered, reason not given Quality 431: Preventive Care And Screening: Unhealthy Alcohol Use - Screening: Patient identified as an unhealthy alcohol user when screened for unhealthy alcohol use using a systematic screening method and received brief counseling Quality 226: Preventive Care And Screening: Tobacco Use: Screening And Cessation Intervention: Patient screened for tobacco use and is an ex/non-smoker Detail Level: Detailed

## 2024-01-10 NOTE — TELEPHONE ENCOUNTER
Spoke to patient made aware that she will need to f/u with PCP concerning jaw pain. Verbalized understanding.

## 2024-02-22 NOTE — SUBJECTIVE & OBJECTIVE
Interval History:     Review of Systems   Constitutional: Negative for activity change, appetite change, fatigue and fever.   HENT: Negative for congestion, ear discharge, mouth sores, nosebleeds, rhinorrhea, sinus pressure, sinus pain and tinnitus.    Eyes: Negative.  Negative for pain, redness and itching.   Respiratory: Negative for apnea, cough, choking, chest tightness, shortness of breath, wheezing and stridor.    Cardiovascular: Negative for chest pain, palpitations and leg swelling.   Gastrointestinal: Positive for abdominal pain and constipation. Negative for abdominal distention, anal bleeding, blood in stool and diarrhea.   Endocrine: Negative.    Genitourinary: Negative for difficulty urinating, flank pain, frequency and urgency.   Musculoskeletal: Negative for arthralgias, back pain, gait problem and myalgias.   Skin: Negative for color change and pallor.   Allergic/Immunologic: Negative.    Neurological: Negative for dizziness, facial asymmetry, weakness, light-headedness and headaches.   Hematological: Negative for adenopathy. Does not bruise/bleed easily.   Psychiatric/Behavioral: The patient is nervous/anxious.      Objective:     Vital Signs (Most Recent):  Temp: 96.4 °F (35.8 °C) (02/01/20 1456)  Pulse: 83 (02/01/20 1552)  Resp: 16 (02/01/20 1552)  BP: 131/73 (02/01/20 1456)  SpO2: 98 % (02/01/20 1552) Vital Signs (24h Range):  Temp:  [96.4 °F (35.8 °C)-97.1 °F (36.2 °C)] 96.4 °F (35.8 °C)  Pulse:  [69-89] 83  Resp:  [16-19] 16  SpO2:  [93 %-100 %] 98 %  BP: (106-131)/(61-77) 131/73     Weight: 59.9 kg (132 lb)  Body mass index is 22.66 kg/m².    Intake/Output Summary (Last 24 hours) at 2/1/2020 1616  Last data filed at 2/1/2020 1300  Gross per 24 hour   Intake 2375 ml   Output 2700 ml   Net -325 ml      Physical Exam   Constitutional: She is oriented to person, place, and time. She appears well-developed and well-nourished.   HENT:   Head: Normocephalic and atraumatic.   Eyes: Pupils are equal,  round, and reactive to light. EOM are normal.   Neck: Normal range of motion. Neck supple. No tracheal deviation present. No thyromegaly present.   Cardiovascular: Normal rate, regular rhythm and normal heart sounds.   Pulmonary/Chest: Effort normal and breath sounds normal.   Abdominal: Soft. Bowel sounds are normal. She exhibits no distension. There is tenderness. There is no rebound and no guarding.       Musculoskeletal: Normal range of motion.   Lymphadenopathy:     She has no cervical adenopathy.   Neurological: She is alert and oriented to person, place, and time.   Skin: Skin is warm and dry. Capillary refill takes less than 2 seconds.   Psychiatric: She has a normal mood and affect. Her behavior is normal. Judgment and thought content normal.   Nursing note reviewed.      Significant Labs:   Recent Lab Results       02/01/20  1211        Anion Gap 9     BUN, Bld 6     Calcium 8.1     Chloride 106     CO2 21     Creatinine 1.0     eGFR if African American >60.0     eGFR if non  >60.0  Comment:  Calculation used to obtain the estimated glomerular filtration  rate (eGFR) is the CKD-EPI equation.        Glucose 91     Potassium 3.0     Sodium 136         All pertinent labs within the past 24 hours have been reviewed.    Significant Imaging: I have reviewed and interpreted all pertinent imaging results/findings within the past 24 hours.   Female

## 2024-09-23 ENCOUNTER — TELEPHONE (OUTPATIENT)
Facility: CLINIC | Age: 59
End: 2024-09-23
Payer: MEDICARE

## 2024-09-23 DIAGNOSIS — C50.211 MALIGNANT NEOPLASM OF UPPER-INNER QUADRANT OF RIGHT BREAST IN FEMALE, ESTROGEN RECEPTOR NEGATIVE: ICD-10-CM

## 2024-09-23 DIAGNOSIS — Z17.1 MALIGNANT NEOPLASM OF UPPER-INNER QUADRANT OF RIGHT BREAST IN FEMALE, ESTROGEN RECEPTOR NEGATIVE: ICD-10-CM

## 2024-09-23 DIAGNOSIS — Z17.1 ESTROGEN RECEPTOR NEGATIVE TUMOR STATUS: Primary | ICD-10-CM

## 2024-12-30 DIAGNOSIS — J44.9 CHRONIC OBSTRUCTIVE PULMONARY DISEASE, UNSPECIFIED COPD TYPE: Primary | ICD-10-CM

## 2024-12-30 RX ORDER — ALBUTEROL SULFATE 90 UG/1
2 INHALANT RESPIRATORY (INHALATION) EVERY 6 HOURS PRN
Qty: 18 G | Refills: 11 | Status: SHIPPED | OUTPATIENT
Start: 2024-12-30 | End: 2025-12-30

## 2025-02-19 ENCOUNTER — LAB VISIT (OUTPATIENT)
Dept: LAB | Facility: HOSPITAL | Age: 60
End: 2025-02-19
Attending: FAMILY MEDICINE
Payer: MEDICARE

## 2025-02-19 DIAGNOSIS — E78.5 HYPERLIPEMIA: Primary | ICD-10-CM

## 2025-02-19 LAB
CHOLEST SERPL-MCNC: 288 MG/DL (ref 120–199)
CHOLEST/HDLC SERPL: 5.8 {RATIO} (ref 2–5)
ESTIMATED AVG GLUCOSE: 137 MG/DL (ref 68–131)
HBA1C MFR BLD: 6.4 % (ref 4–5.6)
HDLC SERPL-MCNC: 50 MG/DL (ref 40–75)
HDLC SERPL: 17.4 % (ref 20–50)
LDLC SERPL CALC-MCNC: 190.8 MG/DL (ref 63–159)
NONHDLC SERPL-MCNC: 238 MG/DL
TRIGL SERPL-MCNC: 236 MG/DL (ref 30–150)
TSH SERPL DL<=0.005 MIU/L-ACNC: 1.61 UIU/ML (ref 0.4–4)

## 2025-02-19 PROCEDURE — 80061 LIPID PANEL: CPT | Performed by: FAMILY MEDICINE

## 2025-02-19 PROCEDURE — 84439 ASSAY OF FREE THYROXINE: CPT | Performed by: FAMILY MEDICINE

## 2025-02-19 PROCEDURE — 36415 COLL VENOUS BLD VENIPUNCTURE: CPT | Performed by: FAMILY MEDICINE

## 2025-02-19 PROCEDURE — 84443 ASSAY THYROID STIM HORMONE: CPT | Performed by: FAMILY MEDICINE

## 2025-02-19 PROCEDURE — 83036 HEMOGLOBIN GLYCOSYLATED A1C: CPT | Mod: GA | Performed by: FAMILY MEDICINE

## 2025-02-20 LAB — T4 FREE SERPL-MCNC: 0.98 NG/DL (ref 0.71–1.51)

## 2025-03-10 ENCOUNTER — LAB VISIT (OUTPATIENT)
Dept: LAB | Facility: HOSPITAL | Age: 60
End: 2025-03-10
Attending: INTERNAL MEDICINE
Payer: MEDICARE

## 2025-03-10 DIAGNOSIS — Z17.1 MALIGNANT NEOPLASM OF UPPER-INNER QUADRANT OF RIGHT BREAST IN FEMALE, ESTROGEN RECEPTOR NEGATIVE: ICD-10-CM

## 2025-03-10 DIAGNOSIS — C50.211 MALIGNANT NEOPLASM OF UPPER-INNER QUADRANT OF RIGHT BREAST IN FEMALE, ESTROGEN RECEPTOR NEGATIVE: ICD-10-CM

## 2025-03-10 LAB
ALBUMIN SERPL BCP-MCNC: 4 G/DL (ref 3.5–5.2)
ALP SERPL-CCNC: 87 U/L (ref 40–150)
ALT SERPL W/O P-5'-P-CCNC: 34 U/L (ref 10–44)
ANION GAP SERPL CALC-SCNC: 11 MMOL/L (ref 8–16)
AST SERPL-CCNC: 35 U/L (ref 10–40)
BASOPHILS # BLD AUTO: 0.04 K/UL (ref 0–0.2)
BASOPHILS NFR BLD: 0.8 % (ref 0–1.9)
BILIRUB SERPL-MCNC: 0.4 MG/DL (ref 0.1–1)
BUN SERPL-MCNC: 17 MG/DL (ref 6–20)
CALCIUM SERPL-MCNC: 9.3 MG/DL (ref 8.7–10.5)
CHLORIDE SERPL-SCNC: 106 MMOL/L (ref 95–110)
CO2 SERPL-SCNC: 21 MMOL/L (ref 23–29)
CREAT SERPL-MCNC: 1.2 MG/DL (ref 0.5–1.4)
DIFFERENTIAL METHOD BLD: NORMAL
EOSINOPHIL # BLD AUTO: 0.1 K/UL (ref 0–0.5)
EOSINOPHIL NFR BLD: 2.1 % (ref 0–8)
ERYTHROCYTE [DISTWIDTH] IN BLOOD BY AUTOMATED COUNT: 12.5 % (ref 11.5–14.5)
EST. GFR  (NO RACE VARIABLE): 51.8 ML/MIN/1.73 M^2
GLUCOSE SERPL-MCNC: 203 MG/DL (ref 70–110)
HCT VFR BLD AUTO: 38.4 % (ref 37–48.5)
HGB BLD-MCNC: 13.3 G/DL (ref 12–16)
IMM GRANULOCYTES # BLD AUTO: 0.02 K/UL (ref 0–0.04)
IMM GRANULOCYTES NFR BLD AUTO: 0.4 % (ref 0–0.5)
LYMPHOCYTES # BLD AUTO: 1.7 K/UL (ref 1–4.8)
LYMPHOCYTES NFR BLD: 35.3 % (ref 18–48)
MCH RBC QN AUTO: 29.3 PG (ref 27–31)
MCHC RBC AUTO-ENTMCNC: 34.6 G/DL (ref 32–36)
MCV RBC AUTO: 85 FL (ref 82–98)
MONOCYTES # BLD AUTO: 0.3 K/UL (ref 0.3–1)
MONOCYTES NFR BLD: 5.6 % (ref 4–15)
NEUTROPHILS # BLD AUTO: 2.7 K/UL (ref 1.8–7.7)
NEUTROPHILS NFR BLD: 55.8 % (ref 38–73)
NRBC BLD-RTO: 0 /100 WBC
PLATELET # BLD AUTO: 236 K/UL (ref 150–450)
PMV BLD AUTO: 10.1 FL (ref 9.2–12.9)
POTASSIUM SERPL-SCNC: 3.9 MMOL/L (ref 3.5–5.1)
PROT SERPL-MCNC: 7.2 G/DL (ref 6–8.4)
RBC # BLD AUTO: 4.54 M/UL (ref 4–5.4)
SODIUM SERPL-SCNC: 138 MMOL/L (ref 136–145)
WBC # BLD AUTO: 4.85 K/UL (ref 3.9–12.7)

## 2025-03-10 PROCEDURE — 80053 COMPREHEN METABOLIC PANEL: CPT | Performed by: INTERNAL MEDICINE

## 2025-03-10 PROCEDURE — 85025 COMPLETE CBC W/AUTO DIFF WBC: CPT | Performed by: INTERNAL MEDICINE

## 2025-03-10 PROCEDURE — 36415 COLL VENOUS BLD VENIPUNCTURE: CPT | Performed by: INTERNAL MEDICINE

## 2025-03-11 ENCOUNTER — RESULTS FOLLOW-UP (OUTPATIENT)
Facility: CLINIC | Age: 60
End: 2025-03-11

## 2025-03-11 NOTE — PROGRESS NOTES
[facility] Hematology/Oncology  PROGRESS NOTE - Follow-up Visit      Subjective:       Patient ID:   NAME: Clara Dotson : 1965     59 y.o. female    Referring Doc: Dave  Other Physicians: Lucila (new card), Juan Diego (PCP); Kalina (Neuro); Bhaskar    Chief Complaint:  breast ca  f/u    History of Present Illness:     Patient is being seen today in person.The patient is her by herself.     She last showed for oncology clinic appointment back in Oct 2023    She saw Dr Chai Vazquez (PCP) couple of weeks ago and had some blood work done by him.    She has some fatigue and general lack of energy       She has chronic back issues which are stable.      She has not followed up with dermatology or pulmonary    She is otherwise doing ok.  She denies any current CP, SOB,HA's or N/V. Breathing stable.       She last had a mammogram in May 2023 and CXR in 2023           ROS:   GEN: normal without any fever, night sweats or weight loss; fatigue  HEENT: normal with no HA's, sore throat, stiff neck, changes in vision; hearing issues  CV: normal with no CP, SOB, PND, LYNCH or orthopnea  PULM: chronic stable SOB  GI: normal with no abdominal pain, nausea, vomiting, constipation, diarrhea, melanotic stools,  BRBPR, or hematemesis;    : normal with no hematuria, dysuria  SKIN: normal with no rash, erythema, bruising, or swelling    Allergies:  Review of patient's allergies indicates:   Allergen Reactions    Pcn [penicillins] Hives       Medications:    Current Outpatient Medications:     albuterol (VENTOLIN HFA) 90 mcg/actuation inhaler, Inhale 2 puffs into the lungs every 6 (six) hours as needed for Wheezing. Rescue, Disp: 18 g, Rfl: 11    albuterol-ipratropium (DUO-NEB) 2.5 mg-0.5 mg/3 mL nebulizer solution, USE 1 VIAL IN NEBULIZER EVERY 6 HOURS AS NEEDED FOR SHORTNESS OF BREATH OR WHEEZING, Disp: , Rfl:     ascorbic acid, vitamin C, (VITAMIN C) 100 MG tablet, Take 100 mg by mouth once daily., Disp: , Rfl:     aspirin 325 MG  tablet, Take 325 mg by mouth once daily., Disp: , Rfl:     atorvastatin (LIPITOR) 40 MG tablet, 40 mg., Disp: , Rfl:     azithromycin (Z-BOY) 250 MG tablet, TAKE 2 TABLETS ON DAY 1 THEN 1 TABLET DAILY ON DAYS 2-5 THANK YOU, Disp: , Rfl:     benzonatate (TESSALON) 100 MG capsule, TAKE ONE CAPSULE BY MOUTH THREE TIMES DAILY AS NEEDED for cough THANK YOU, Disp: , Rfl:     doxepin (SINEQUAN) 10 MG capsule, Take 1-3 capsule by mouth every night as needed THANK YOU, Disp: , Rfl:     doxycycline (VIBRAMYCIN) 100 MG Cap, Take 1 capsule (100 mg total) by mouth 2 (two) times daily., Disp: 20 capsule, Rfl: 0    enalapril (VASOTEC) 5 MG tablet, TAKE ONE TABLET BY MOUTH EVERY DAY **THANK YOU**, Disp: 30 tablet, Rfl: 3    gabapentin (NEURONTIN) 400 MG capsule, Take 1 capsule (400 mg total) by mouth 3 (three) times daily., Disp: 270 capsule, Rfl: 3    hydrocodone-acetaminophen 10-325mg (NORCO)  mg Tab, every 6 (six) hours as needed. , Disp: , Rfl:     isosorbide mononitrate (IMDUR) 60 MG 24 hr tablet, Take 1 tablet (60 mg total) by mouth once daily., Disp: 30 tablet, Rfl: 3    levoFLOXacin (LEVAQUIN) 750 MG tablet, TAKE ONE TABLET BY MOUTH EVERY 24 HOURS FOR 10 DAYS, Disp: , Rfl:     methylPREDNISolone (MEDROL DOSEPACK) 4 mg tablet, Take by mouth., Disp: , Rfl:     mupirocin (BACTROBAN) 2 % ointment, Apply topically 3 (three) times daily., Disp: 30 g, Rfl: 1    NEXLETOL 180 mg Tab, Take 1 tablet by mouth., Disp: , Rfl:     nitroGLYCERIN (NITROSTAT) 0.4 MG SL tablet, DISSOLVE ONE TABLET UNDER THE TONGUE FOR CHEST PAIN MAY REPEAT EVERY FIVE MINUTES FOR NO MORE THAN THREE DOSES **THANK YOU**, Disp: 25 tablet, Rfl: 11    ondansetron (ZOFRAN) 8 MG tablet, Take 1 tablet (8 mg total) by mouth every 8 (eight) hours as needed for Nausea., Disp: 30 tablet, Rfl: 0    pantoprazole (PROTONIX) 40 MG tablet, Take 1 tablet (40 mg total) by mouth once daily. Take in the morning before breakfast.  Wait 30 minutes before eating or drinking  anything, Disp: 30 tablet, Rfl: 1    potassium chloride SA (K-DUR,KLOR-CON) 20 MEQ tablet, Take 1 tablet (20 mEq total) by mouth 2 (two) times daily., Disp: 60 tablet, Rfl: 2    tiotropium (SPIRIVA WITH HANDIHALER) 18 mcg inhalation capsule, Inhale 1 capsule (18 mcg total) into the lungs once daily. Controller, Disp: 30 capsule, Rfl: 11    VENTOLIN HFA 90 mcg/actuation inhaler, inhale TWO puffs into lungs EVERY 6 HOURS AS NEEDED FOR WHEEZING THANK YOU, Disp: , Rfl: 6    vitamin D (VITAMIN D3) 1000 units Tab, Take 1,000 Units by mouth once daily., Disp: , Rfl:     ZINC ORAL, Take by mouth., Disp: , Rfl:     ezetimibe (ZETIA) 10 mg tablet, Take 1 tablet (10 mg total) by mouth once daily. (Patient not taking: Reported on 3/12/2025), Disp: 30 tablet, Rfl: 3    Current Facility-Administered Medications:     cyanocobalamin injection 100 mcg, 100 mcg, Intramuscular, Q30 Days, Israel Neely MD, 1,000 mcg at 06/02/18 0945    PMHx/PSHx Updates:  See patient's last visit with me on  10/12/2023  See H&P in Vol #1        Pathology:  See Vol #1    EGD and colonoscopy 5/16/2022:    Final Pathologic Diagnosis 1. Duodenum, biopsy:   - Duodenal mucosa without significant histopathologic alteration   - Negative for active duodenitis or celiac-like injury   - Negative for dysplasia or malignancy   2. Stomach, antrum,biopsy:   - Mild chronic inactive gastritis   - Negative for Helicobacter pylori on H&E stain and immunostain   - Negative for intestinal metaplasia, dysplasia or malignancy   NOTE: Positive control and internal negative control for Helicobacter pylori   immunostain were examined and were appropriate.   3. Stomach, polypectomy:   - Gastric hyperplastic polyp   - Negative for Helicobacter pylori on H&E stain   - Negative for intestinal metaplasia, dysplasia or malignancy   4. Stomach, biopsies:   - Gastric fundic-type mucosa without significant histopathologic alteration   - Negative for Helicobacter pylori on H&E  "stain   - Negative for intestinal metaplasia, dysplasia or malignancy   5. Ano-rectal verge, polypectomies:   - Hyperplastic polyp, multiple fragments   - Negative for dysplasia or malignancy            Objective:     Vitals:  Blood pressure (!) (P) 140/80, pulse 75, temperature 97.5 °F (36.4 °C), temperature source Temporal, resp. rate 16, height 5' 4" (1.626 m), weight 65.1 kg (143 lb 8 oz), SpO2 99%.        Physical Examination:   GEN: no apparent distress, comfortable; AAOx3  HEAD: atraumatic and normocephalic  EYES: no pallor, no icterus, PERRLA  ENT: OMM, no pharyngeal erythema, external ears WNL; no nasal discharge; no thrush  NECK: no masses, thyroid normal, trachea midline, no LAD/LN's, supple  CV: RRR with no murmur; normal pulse; normal S1 and S2; no pedal edema  CHEST: Normal respiratory effort; CTAB; normal breath sounds; no wheeze or crackles  ABDOM: nontender and nondistended; soft; normal bowel sounds; no rebound/guarding; hernia  MUSC/Skeletal: ROM normal; no crepitus; joints normal; no deformities or arthropathy  EXTREM: no clubbing, cyanosis, inflammation or swelling  SKIN: no rashes, lesions, ulcers, petechiae or subcutaneous nodules; tattoos; some actinic keratosis on chest and face  : no keen  NEURO: grossly intact; motor/sensory WNL; AAOx3; no tremors  PSYCH: normal mood, affect and behavior  LYMPH: normal cervical, supraclavicular, axillary and groin LN's  Breast: s/p bilateral mastectomies          Labs:      Lab Results   Component Value Date    WBC 4.85 03/10/2025    HGB 13.3 03/10/2025    HCT 38.4 03/10/2025    MCV 85 03/10/2025     03/10/2025          BMP  Lab Results   Component Value Date     03/10/2025    K 3.9 03/10/2025     03/10/2025    CO2 21 (L) 03/10/2025    BUN 17 03/10/2025    CREATININE 1.2 03/10/2025    CALCIUM 9.3 03/10/2025    ANIONGAP 11 03/10/2025    ESTGFRAFRICA >60.0 05/13/2022    EGFRNONAA >60.0 05/13/2022     Lab Results   Component Value Date "    ALT 34 03/10/2025    AST 35 03/10/2025    ALKPHOS 87 03/10/2025    BILITOT 0.4 03/10/2025     Glucose 203 High        Radiology/Diagnostic Studies:    Mammo 5/16/2023:    Assessment    Overall   1 - Negative         CXR  6/8/2023:    IMPRESSION:  1. No acute radiographic abnormalities or detrimental changes when compared to May 2023    Mammo 4/28/2022:    Impression:     1. No mammographic evidence of malignancy and no detrimental change.  2. Yearly mammography is recommended.  BI-RADS CATEGORY 1: NEGATIVE.       CXR 4/29/2022:  FINDINGS:  Patient rotated to the RPO position.  Mild chronic interstitial change.  No focal consolidation.     Heart size top normal.  Mediastinal contours unremarkable.  Trachea slightly rotated to the right.     Bony thorax intact.      Mammo 4/21/2021:    Impression:     No mammographic evidence of malignancy     ASSESSMENT  ACR BI-RADS Category 1  Negative     RECOMMENDATION:     Annual screening mammography is recommended.    Mammo and bilat US 4/14/2020:    FINDINGS:  Breast Density: Amost entirely fat.     No suspicious mass, microcalcification, or architectural distortion.     Bilateral breast ultrasound     Sonographic evaluation of the palpable area concern in the 3 o'clock position the right breast shows no discrete solid or cystic mass.     Sonographic evaluation of the 10 o'clock position left breast shows no discrete solid or cystic mass.     Impression:     Bilateral mastectomies with tram reconstruction.  There is no mammographic or sonographic abnormality in either breast.     Recommendation: Annual screening mammography.     BI-RADS CATEGORY: 1  NEGATIVE        CXR 4/14/2020:    Impression       No acute pulmonary process             CT abdom/pelvis  1/31/2020:    Impression       Mild wall thickening in the right and transverse colon and in the distal small bowel, suggesting an enteritis/colitis.  Inflammatory bowel disease is a differential consideration.    No  abscess, perforation, obstruction.    No evidence of metastatic malignancy.             PET/CT Fusion 6/4/2018:  IMPRESSION:    1. Negative for recurrent malignancy or metastatic disease.    2. Development of moderate hepatic steatosis.        CXR 5/31/2017 negative    I have reviewed all available lab results and radiology reports.    Assessment/Plan:     (1) 59 y.o. female with diagnosis of right breast cancer  - diagnosed in 2004  - Stage II  - s/p bilateral mastectomies  - followed by Dr Acosta with [facility]  - s/p AC x4 and XRT  - triple negative  - CT scans in 3/2016  - PET on 6/4/2018 - negative for recurrent cancer  - repeat mammogram and US on 4/14/2020 negative and CXR was normal as well  - will refer her to see Dr Acosta for the area she has a concern for on the breast    1/21/2021:  - she has been seeing Dr Acosta about the breast nodule and he is continuing to observe for now  - mammo due again in April 2021    10/6/2021:  - doing ok overall  - followed closely by dr Acosta  - next mammo due 4/22/2022 5/26/2022:  - mammo and CXR on 4/28/2022 appear to be stable  - repeat mammo in one year  - follow-up with Dr acosta as directed    10/12/2023:  - followed by Dr Acosta and plans to see him in near future about having hernia surgery  - mammo in May 2023 negative  - CXR June 2023 stable    3/12/2025:   - She last showed for oncology clinic appointment back in Oct 2023  - She saw Dr Chai Vazquez (PCP) couple of weeks ago and had some blood work done by him.  - She has some fatigue and general lack of energy  - She has not followed up with dermatology or pulmonary  -  She last had a mammogram in May 2023 and CXR in June 2023          (2) hx/of TIA's and PAD  - followed by Dr Whitlock with cardiology and now by Dr Gaytan     (3) COPD and former smoker  - followed by Dr Wolff with pulm in past  - she is on two separate inhalers     (4) Right inferior auricular biopsy in Nov 2013 - atypical single unit junctional  melanocytic hyperplasia with no evidence of malignancy  - Dr Acosta following       (5) Arthritic issues    (6) Skin cancer issues - followed by Dr Manley (Derm)    (7) Lower extremity issues - she is seeing Dr Gilman/René with Neurology at [place] and she had a nerve conduction study and she plans to see ortho in future    (8) Chronic pain syndrome - followed by Pain management - Dr Barajas    (9) Chronic dysphagia and hx/of hepatic steatosis - s/p prior colonoscopy with Dr Muro  - she plans to see GI with [facility] in [place] in near future    10/6/2021:  - she still has not followed up with GI for repeat endoscopies - I encouraged her to do so as soon as she can    5/26/2022:  - s/p recent EGD and colonoscopy with Dr Orestes Alvarado        (10) Hearing issues - she plans to see Dr Barajas with ENT in near future in [facility]    (11) Hx/of covid in Sept/oct 2021         1. Malignant neoplasm of upper-inner quadrant of right breast in female, estrogen receptor negative        2. Estrogen receptor negative tumor status        3. Personal history of tobacco use, presenting hazards to health                PLAN:  1. F/u with PCP about her recent hyperglycemia  2. Overdue for repeat CXR and mammo  3.  F/u with pulmonary and dermatology - encouraged compliance  4. F/u with GI as directed      RTC in 12 months    Fax note to Chai Adamson (PCP), Bijan Acosta [place], [place]; New/Bhaskar Shah/Christiano        Total Time spent on patient:    I spent over 15 mins of time with the patient. Reviewed results of the recently ordered labs, tests, reports and studies; made directives with regards to the results. Over half of this time was spent couseling and coordinating care, making treatment and analytical decisions; ordering necessary labs, tests and studies; and discussing treatment options and setting up treatment plan(s) if indicated.      Discussion:       COVID-19 Discussion:    I had long  discussion with patient and any applicable family about the COVID-19 coronavirus epidemic and the recommended precautions with regard to cancer and/or hematology patients. I have re-iterated the CDC recommendations for adequate hand washing, use of hand -like products, and coughing into elbow, etc. In addition, especially for our patients who are on chemotherapy and/or our otherwise immunocompromised patients, I have recommended avoidance of crowds, including movie theaters, restaurants, churches, etc. I have recommended avoidance of any sick or symptomatic family members and/or friends. I have also recommended avoidance of any raw and unwashed food products, and general avoidance of food items that have not been prepared by themselves. The patient has been asked to call us immediately with any symptom developments, issues, questions or other general concerns.          I have explained all of the above in detail and the patient understands all of the current recommendation(s). I have answered all of their questions to the best of my ability and to their complete satisfaction.   The patient is to continue with the current management plan.            Electronically signed by Israel Neely MD

## 2025-03-12 ENCOUNTER — OFFICE VISIT (OUTPATIENT)
Facility: CLINIC | Age: 60
End: 2025-03-12
Payer: MEDICARE

## 2025-03-12 VITALS
BODY MASS INDEX: 24.5 KG/M2 | TEMPERATURE: 98 F | HEART RATE: 75 BPM | WEIGHT: 143.5 LBS | RESPIRATION RATE: 16 BRPM | OXYGEN SATURATION: 99 % | HEIGHT: 64 IN

## 2025-03-12 DIAGNOSIS — Z17.1 ESTROGEN RECEPTOR NEGATIVE TUMOR STATUS: ICD-10-CM

## 2025-03-12 DIAGNOSIS — Z87.891 PERSONAL HISTORY OF TOBACCO USE, PRESENTING HAZARDS TO HEALTH: ICD-10-CM

## 2025-03-12 DIAGNOSIS — C50.211 MALIGNANT NEOPLASM OF UPPER-INNER QUADRANT OF RIGHT FEMALE BREAST: Primary | ICD-10-CM

## 2025-03-12 DIAGNOSIS — C50.211 MALIGNANT NEOPLASM OF UPPER-INNER QUADRANT OF RIGHT BREAST IN FEMALE, ESTROGEN RECEPTOR NEGATIVE: Primary | ICD-10-CM

## 2025-03-12 DIAGNOSIS — Z17.1 MALIGNANT NEOPLASM OF UPPER-INNER QUADRANT OF RIGHT BREAST IN FEMALE, ESTROGEN RECEPTOR NEGATIVE: Primary | ICD-10-CM

## 2025-03-12 PROCEDURE — 4010F ACE/ARB THERAPY RXD/TAKEN: CPT | Mod: CPTII,S$GLB,, | Performed by: INTERNAL MEDICINE

## 2025-03-12 PROCEDURE — 99999 PR PBB SHADOW E&M-EST. PATIENT-LVL III: CPT | Mod: PBBFAC,,, | Performed by: INTERNAL MEDICINE

## 2025-03-12 PROCEDURE — 1159F MED LIST DOCD IN RCRD: CPT | Mod: CPTII,S$GLB,, | Performed by: INTERNAL MEDICINE

## 2025-03-12 PROCEDURE — 1160F RVW MEDS BY RX/DR IN RCRD: CPT | Mod: CPTII,S$GLB,, | Performed by: INTERNAL MEDICINE

## 2025-03-12 PROCEDURE — G2211 COMPLEX E/M VISIT ADD ON: HCPCS | Mod: S$GLB,,, | Performed by: INTERNAL MEDICINE

## 2025-03-12 PROCEDURE — 3008F BODY MASS INDEX DOCD: CPT | Mod: CPTII,S$GLB,, | Performed by: INTERNAL MEDICINE

## 2025-03-12 PROCEDURE — 3044F HG A1C LEVEL LT 7.0%: CPT | Mod: CPTII,S$GLB,, | Performed by: INTERNAL MEDICINE

## 2025-03-12 PROCEDURE — 99214 OFFICE O/P EST MOD 30 MIN: CPT | Mod: S$GLB,,, | Performed by: INTERNAL MEDICINE

## 2025-04-03 ENCOUNTER — HOSPITAL ENCOUNTER (OUTPATIENT)
Dept: RADIOLOGY | Facility: HOSPITAL | Age: 60
Discharge: HOME | End: 2025-04-03
Attending: INTERNAL MEDICINE
Payer: MEDICARE

## 2025-04-03 ENCOUNTER — RESULTS FOLLOW-UP (OUTPATIENT)
Dept: HEMATOLOGY/ONCOLOGY | Facility: HOSPITAL | Age: 60
End: 2025-04-03

## 2025-04-03 DIAGNOSIS — Z87.891 PERSONAL HISTORY OF TOBACCO USE, PRESENTING HAZARDS TO HEALTH: ICD-10-CM

## 2025-04-03 DIAGNOSIS — C50.211 MALIGNANT NEOPLASM OF UPPER-INNER QUADRANT OF RIGHT BREAST IN FEMALE, ESTROGEN RECEPTOR NEGATIVE: ICD-10-CM

## 2025-04-03 DIAGNOSIS — C50.211 MALIGNANT NEOPLASM OF UPPER-INNER QUADRANT OF RIGHT FEMALE BREAST: ICD-10-CM

## 2025-04-03 DIAGNOSIS — Z17.1 MALIGNANT NEOPLASM OF UPPER-INNER QUADRANT OF RIGHT BREAST IN FEMALE, ESTROGEN RECEPTOR NEGATIVE: ICD-10-CM

## 2025-04-03 PROCEDURE — 71046 X-RAY EXAM CHEST 2 VIEWS: CPT | Mod: 26,,, | Performed by: RADIOLOGY

## 2025-04-03 PROCEDURE — 77062 BREAST TOMOSYNTHESIS BI: CPT | Mod: 26,,, | Performed by: RADIOLOGY

## 2025-04-03 PROCEDURE — 71046 X-RAY EXAM CHEST 2 VIEWS: CPT | Mod: TC,PO

## 2025-04-03 PROCEDURE — 76642 ULTRASOUND BREAST LIMITED: CPT | Mod: 26,RT,, | Performed by: RADIOLOGY

## 2025-04-03 PROCEDURE — 77062 BREAST TOMOSYNTHESIS BI: CPT | Mod: TC,PO

## 2025-04-03 PROCEDURE — 76642 ULTRASOUND BREAST LIMITED: CPT | Mod: TC,PO,RT

## 2025-04-03 PROCEDURE — 77066 DX MAMMO INCL CAD BI: CPT | Mod: 26,,, | Performed by: RADIOLOGY

## 2025-04-07 ENCOUNTER — TELEPHONE (OUTPATIENT)
Facility: CLINIC | Age: 60
End: 2025-04-07
Payer: MEDICARE

## 2025-04-07 NOTE — TELEPHONE ENCOUNTER
----- Message from Israel Neely MD sent at 4/3/2025  4:15 PM CDT -----  Please call her with report   ----- Message -----  From: Edel, Rad Results In  Sent: 4/3/2025   3:34 PM CDT  To: Israel Neely MD

## 2025-04-07 NOTE — TELEPHONE ENCOUNTER
Attempted to call patient with above negative report from her mammogram. She verbalized understanding.

## (undated) DEVICE — KIT CANIST SUCTION 1200CC

## (undated) DEVICE — KIT ENDO CARRY-ON PROC 100310

## (undated) DEVICE — FORCEP BIOSY RJ 4 HOT 2.2X240

## (undated) DEVICE — BITE BLOCK ADULT LATEX FREE

## (undated) DEVICE — GLOVE SURG ULTRA TOUCH 7.5

## (undated) DEVICE — SOL WATER STRL IRR 1000ML

## (undated) DEVICE — SNARE CAPTIVATOR II 25MM ROUND

## (undated) DEVICE — TRAP MUCUS SPECIMEN 80CC

## (undated) DEVICE — SYR SLIP TIP 5CC